# Patient Record
Sex: FEMALE | Race: BLACK OR AFRICAN AMERICAN | Employment: OTHER | URBAN - METROPOLITAN AREA
[De-identification: names, ages, dates, MRNs, and addresses within clinical notes are randomized per-mention and may not be internally consistent; named-entity substitution may affect disease eponyms.]

---

## 2018-08-28 NOTE — H&P
Adonis Elkins M.D.  [phone]            History and Physical       NAME:  Medora Phalen   :   [birth year]   MRN:   [id number]       Referring Physician:  No primary care provider on file. Consult Date: 2018 3:34 PM    Chief Complaint: RUQ Pain    History of Present Illness:  Ms. Keke Hebert is a 75year old lady who is being seen today in consultation per request of Dr. Nolan Huertas for abdominal pain. States she has been having RUQ pain that has been ongoing for at least a years time. States it flared up when she moved here from [place]. Pain is dull achy and constant. rates the pain at 7/10 in severity. Denies any radiation of her pain  She was given a course of Abx recently for a UTI and that seemed to have helped her pain for 2 days but came back. Denies any other relieving factors. Denies any known aggravating factors. Denies any association with food intake. Her GB was removed about 8 years ago for abdominal pain. She endorses a h/o IBS-D. Her last colonoscopy was 2 years ago -- had polyps. She also had an US 1 year ago and was normal.    The move has been very stressful for her. PMH:  Past Medical History:   Diagnosis Date    Arthritis     Hypertension        PSH:  Past Surgical History:   Procedure Laterality Date    HX GI      colon resection during hysterectomy    HX HEENT      skin cyst removed from neck    HX HYSTERECTOMY      HX SHOULDER ARTHROSCOPY      right    HX UROLOGICAL      bladder repair during hysterectomy       Allergies: Allergies   Allergen Reactions    Iron Other (comments)     IV IRON only convulsions    Nsaids (Non-Steroidal Anti-Inflammatory Drug) Other (comments)     convulsions       Home Medications:  Cannot display prior to admission medications because the patient has not been admitted in this contact. Hospital Medications:  No current facility-administered medications for this encounter.       No current outpatient prescriptions on file. Social History:  Social History   Substance Use Topics    Smoking status: Former Smoker     Packs/day: 0.25     Quit date: 1968    Smokeless tobacco: Never Used    Alcohol use No       Family History:  No family history on file. Review of Systems:      Constitutional: negative fever, negative chills, negative weight loss  Eyes:   negative visual changes  ENT:   negative sore throat, tongue or lip swelling  Respiratory:  negative cough, negative dyspnea  Cards:  negative for chest pain, palpitations, lower extremity edema  GI:   See HPI  :  negative for frequency, dysuria  Integument:  negative for rash and pruritus  Heme:  negative for easy bruising and gum/nose bleeding  Musculoskel: negative for myalgias,  back pain and muscle weakness  Neuro: negative for headaches, dizziness, vertigo  Psych:  negative for feelings of anxiety, depression       Objective:   No data found. EXAM:     NEURO-a&o   HEENT-wnl   LUNGS-clear    COR-regular rate and rhythym     ABD-soft , no tenderness, no rebound, good bs     EXT-no edema     Data Review     No results for input(s): WBC, HGB, HCT, PLT, HGBEXT, HCTEXT, PLTEXT in the last 72 hours. No results for input(s): NA, K, CL, CO2, BUN, CREA, GLU, PHOS, CA in the last 72 hours. No results for input(s): SGOT, GPT, AP, TBIL, TP, ALB, GLOB, GGT, AML, LPSE in the last 72 hours. No lab exists for component: AMYP, HLPSE  No results for input(s): INR, PTP, APTT in the last 72 hours. No lab exists for component: INREXT    There is no problem list on file for this patient. Assessment:   · RUQ pain (789.01  R10.11)  · Impression: Seems she had a good response to xifaxan in the recent past. This makes me wonder about small bowel intestinal overgrowth. · I will give her a course of Xifaxan and monitor her response. · Her labs are WNL's and I don't suspect a biliary etiology.   · I will however get an ultrasound  · In addition I will proceed with an EGD for evaluation of PUD  · If her symptoms persist I will consider neuromodulation   Plan:   · EGD today.      Signed By: Claribel Avina MD     8/28/2018  3:34 PM

## 2018-08-29 ENCOUNTER — HOSPITAL ENCOUNTER (OUTPATIENT)
Age: 73
Setting detail: OUTPATIENT SURGERY
Discharge: HOME | End: 2018-08-29
Attending: INTERNAL MEDICINE | Admitting: INTERNAL MEDICINE
Payer: MEDICARE

## 2018-08-29 ENCOUNTER — ANESTHESIA EVENT (OUTPATIENT)
Dept: ENDOSCOPY | Age: 73
End: 2018-08-29
Payer: MEDICARE

## 2018-08-29 ENCOUNTER — ANESTHESIA (OUTPATIENT)
Dept: ENDOSCOPY | Age: 73
End: 2018-08-29
Payer: MEDICARE

## 2018-08-29 VITALS
TEMPERATURE: 97.4 F | BODY MASS INDEX: 19.49 KG/M2 | RESPIRATION RATE: 14 BRPM | SYSTOLIC BLOOD PRESSURE: 159 MMHG | OXYGEN SATURATION: 100 % | HEART RATE: 65 BPM | HEIGHT: 63 IN | DIASTOLIC BLOOD PRESSURE: 73 MMHG | WEIGHT: 110 LBS

## 2018-08-29 PROCEDURE — 74011250636 HC RX REV CODE- 250/636: Performed by: INTERNAL MEDICINE

## 2018-08-29 PROCEDURE — 88342 IMHCHEM/IMCYTCHM 1ST ANTB: CPT | Performed by: INTERNAL MEDICINE

## 2018-08-29 PROCEDURE — 88305 TISSUE EXAM BY PATHOLOGIST: CPT | Performed by: INTERNAL MEDICINE

## 2018-08-29 PROCEDURE — 77030009426 HC FCPS BIOP ENDOSC BSC -B: Performed by: INTERNAL MEDICINE

## 2018-08-29 PROCEDURE — 76040000019: Performed by: INTERNAL MEDICINE

## 2018-08-29 PROCEDURE — 76060000031 HC ANESTHESIA FIRST 0.5 HR: Performed by: INTERNAL MEDICINE

## 2018-08-29 PROCEDURE — 74011250636 HC RX REV CODE- 250/636

## 2018-08-29 RX ORDER — MELOXICAM 15 MG/1
15 TABLET ORAL DAILY
COMMUNITY
End: 2018-08-29

## 2018-08-29 RX ORDER — PROPOFOL 10 MG/ML
INJECTION, EMULSION INTRAVENOUS AS NEEDED
Status: DISCONTINUED | OUTPATIENT
Start: 2018-08-29 | End: 2018-08-29 | Stop reason: HOSPADM

## 2018-08-29 RX ORDER — FLUMAZENIL 0.1 MG/ML
0.2 INJECTION INTRAVENOUS
Status: DISCONTINUED | OUTPATIENT
Start: 2018-08-29 | End: 2018-08-29 | Stop reason: HOSPADM

## 2018-08-29 RX ORDER — DEXTROMETHORPHAN/PSEUDOEPHED 2.5-7.5/.8
1.2 DROPS ORAL
Status: DISCONTINUED | OUTPATIENT
Start: 2018-08-29 | End: 2018-08-29 | Stop reason: HOSPADM

## 2018-08-29 RX ORDER — METOPROLOL SUCCINATE 25 MG/1
TABLET, EXTENDED RELEASE ORAL DAILY
Status: ON HOLD | COMMUNITY
End: 2022-10-12 | Stop reason: ALTCHOICE

## 2018-08-29 RX ORDER — DICYCLOMINE HYDROCHLORIDE 10 MG/1
10 CAPSULE ORAL DAILY
Status: ON HOLD | COMMUNITY
End: 2022-10-12 | Stop reason: ALTCHOICE

## 2018-08-29 RX ORDER — MIDAZOLAM HYDROCHLORIDE 1 MG/ML
.25-5 INJECTION, SOLUTION INTRAMUSCULAR; INTRAVENOUS
Status: DISCONTINUED | OUTPATIENT
Start: 2018-08-29 | End: 2018-08-29 | Stop reason: HOSPADM

## 2018-08-29 RX ORDER — NALOXONE HYDROCHLORIDE 0.4 MG/ML
0.4 INJECTION, SOLUTION INTRAMUSCULAR; INTRAVENOUS; SUBCUTANEOUS
Status: DISCONTINUED | OUTPATIENT
Start: 2018-08-29 | End: 2018-08-29 | Stop reason: HOSPADM

## 2018-08-29 RX ORDER — LIDOCAINE HYDROCHLORIDE 20 MG/ML
INJECTION, SOLUTION EPIDURAL; INFILTRATION; INTRACAUDAL; PERINEURAL AS NEEDED
Status: DISCONTINUED | OUTPATIENT
Start: 2018-08-29 | End: 2018-08-29 | Stop reason: HOSPADM

## 2018-08-29 RX ORDER — PROGESTERONE 100 MG/1
100 CAPSULE ORAL DAILY
Status: ON HOLD | COMMUNITY
End: 2022-10-11

## 2018-08-29 RX ORDER — HYDROCHLOROTHIAZIDE 25 MG/1
25 TABLET ORAL DAILY
Status: ON HOLD | COMMUNITY
End: 2022-10-12 | Stop reason: ALTCHOICE

## 2018-08-29 RX ORDER — SODIUM CHLORIDE 9 MG/ML
50 INJECTION, SOLUTION INTRAVENOUS CONTINUOUS
Status: DISCONTINUED | OUTPATIENT
Start: 2018-08-29 | End: 2018-08-29 | Stop reason: HOSPADM

## 2018-08-29 RX ORDER — CHOLECALCIFEROL TAB 125 MCG (5000 UNIT) 125 MCG
TAB ORAL DAILY
COMMUNITY

## 2018-08-29 RX ORDER — LANOLIN ALCOHOL/MO/W.PET/CERES
CREAM (GRAM) TOPICAL
Status: ON HOLD | COMMUNITY
End: 2022-10-12 | Stop reason: ALTCHOICE

## 2018-08-29 RX ORDER — LOSARTAN POTASSIUM 25 MG/1
TABLET ORAL DAILY
COMMUNITY
End: 2022-10-13

## 2018-08-29 RX ORDER — ATROPINE SULFATE 0.1 MG/ML
0.4 INJECTION INTRAVENOUS
Status: DISCONTINUED | OUTPATIENT
Start: 2018-08-29 | End: 2018-08-29 | Stop reason: HOSPADM

## 2018-08-29 RX ORDER — EPINEPHRINE 0.1 MG/ML
1 INJECTION INTRACARDIAC; INTRAVENOUS
Status: DISCONTINUED | OUTPATIENT
Start: 2018-08-29 | End: 2018-08-29 | Stop reason: HOSPADM

## 2018-08-29 RX ORDER — RANITIDINE 150 MG/1
150 CAPSULE ORAL DAILY
Status: ON HOLD | COMMUNITY
End: 2022-10-12 | Stop reason: ALTCHOICE

## 2018-08-29 RX ORDER — CALCIUM POLYCARBOPHIL 625 MG
625 TABLET ORAL DAILY
Status: ON HOLD | COMMUNITY
End: 2022-10-12 | Stop reason: ALTCHOICE

## 2018-08-29 RX ADMIN — PROPOFOL 50 MG: 10 INJECTION, EMULSION INTRAVENOUS at 14:38

## 2018-08-29 RX ADMIN — PROPOFOL 30 MG: 10 INJECTION, EMULSION INTRAVENOUS at 14:41

## 2018-08-29 RX ADMIN — LIDOCAINE HYDROCHLORIDE 60 MG: 20 INJECTION, SOLUTION EPIDURAL; INFILTRATION; INTRACAUDAL; PERINEURAL at 14:38

## 2018-08-29 RX ADMIN — PROPOFOL 30 MG: 10 INJECTION, EMULSION INTRAVENOUS at 14:43

## 2018-08-29 RX ADMIN — SODIUM CHLORIDE 50 ML/HR: 9 INJECTION, SOLUTION INTRAVENOUS at 14:20

## 2018-08-29 NOTE — PROCEDURES
Ethan Brothers M.D.  [phone]           2018                EGD Operative Report  Cece Lehman  :  1945  [facility] Medical Record Number:  [id number]      Indication: RUQ pain     : Karina Tobias MD    Referring Provider:  Fabi Billingsley MD      Anesthesia/Sedation:  MAC anesthesia Propofol    Airway assessment: No airway problems anticipated    Pre-Procedural Exam:      Airway: clear, no airway problems anticipated  Heart: RRR, without gallops or rubs  Lungs: clear bilaterally without wheezes, crackles, or rhonchi  Abdomen: soft, nontender, nondistended, bowel sounds present  Mental Status: awake, alert and oriented to person, place and time       Procedure Details     After infomed consent was obtained for the procedure, with all risks and benefits of procedure explained the patient was taken to the endoscopy suite and placed in the left lateral decubitus position. Following sequential administration of sedation as per above, the endoscope was inserted into the mouth and advanced under direct vision to second portion of the duodenum. A careful inspection was made as the gastroscope was withdrawn, including a retroflexed view of the proximal stomach; findings and interventions are described below. Findings:   Esophagus:normal  Stomach: gastritis with erosions and erythema noted in the antrum. Biopsies obtained  Duodenum/jejunum: normal    Therapies:  biopsy of stomach  biopsy of duodenal     Specimens: as above           Complications:   None; patient tolerated the procedure well. EBL:  None. Impression:    Gastritis noted. Biopsies obtained to rule out H.pylori infection                Otherwise normal EGD. Biopsies obtained from the small bowel to rule out celiac disease    Recommendations:    -Acid suppression with a proton pump inhibitor. ,   -Await pathology. ,   -Continue bentyl 10 mg TID  -Repeat course of xifaxan 550 mg TID x 14 days    Radha Medrano MD

## 2018-08-29 NOTE — PROGRESS NOTES
Assumed care of patient from anesthesia. Endoscope was pre-cleaned at bedside immediately following procedure by Laz Kerr. Report received from [address] See anesthesia flow-sheet for procedural sedation and vital signs. No respiratory distress. Abdomen without distention. Stable for transfer to recovery per anesthesia.

## 2018-08-29 NOTE — ROUTINE PROCESS
Ildefonso [facility]  1945  [id number]    Situation:  Verbal report received from: Angel Dyer RN  Procedure: Procedure(s):  ESOPHAGOGASTRODUODENOSCOPY (EGD)  ESOPHAGOGASTRODUODENAL (EGD) BIOPSY    Background:    Preoperative diagnosis: RUQ PAIN   Postoperative diagnosis: * No post-op diagnosis entered *    :  Dr. Glendy Barber  Assistant(s): Endoscopy Technician-1: Sangita Romero  Endoscopy RN-1: Robert Phillip RN    Specimens: * No specimens in log *  H. Pylori  no    Assessment:  Intra-procedure medications   Anesthesia gave intra-procedure sedation and medications, see anesthesia flow sheet yes    Intravenous fluids: NS@ KVO     Vital signs stable     Abdominal assessment: round and soft     Recommendation:  Discharge patient per MD order.   Family or Friend   Permission to share finding with family or friend yes

## 2018-08-29 NOTE — IP AVS SNAPSHOT
[address] [place] [address] 
[phone] Patient: Sandy Cabrera MRN: [id number] About your hospitalization You were admitted on:  2018 You last received care in the:  [facility] ENDOSCOPY You were discharged on:  2018 Why you were hospitalized Your primary diagnosis was:  Not on File Follow-up Information None Discharge Orders None A check juan antonio indicates which time of day the medication should be taken. My Medications CHANGE how you take these medications Instructions Each Dose to Equal  
 Morning Noon Evening Bedtime * XIFAXAN 550 mg tablet Generic drug:  rifAXIMin What changed:  Another medication with the same name was added. Make sure you understand how and when to take each. Your last dose was: Your next dose is: Take 550 mg by mouth two (2) times a day. 550 mg  
    
   
   
   
  
 * rifAXIMin 550 mg tablet Commonly known as:  Fabrizio London What changed: You were already taking a medication with the same name, and this prescription was added. Make sure you understand how and when to take each. Your last dose was: Your next dose is: Take 1 Tab by mouth every fourteen (14) days for 14 days. 550 mg  
    
   
   
   
  
 * Notice: This list has 2 medication(s) that are the same as other medications prescribed for you. Read the directions carefully, and ask your doctor or other care provider to review them with you. CONTINUE taking these medications Instructions Each Dose to Equal  
 Morning Noon Evening Bedtime  
 calcium polycarbophil 625 mg tablet Commonly known as:  Lata Schmidt Your last dose was: Your next dose is: Take 625 mg by mouth daily. 625 mg  
    
   
   
   
  
 cholecalciferol (VITAMIN D3) 5,000 unit Tab tablet Commonly known as:  VITAMIN D3 Your last dose was: Your next dose is: Take  by mouth daily. dicyclomine 10 mg capsule Commonly known as:  BENTYL Your last dose was: Your next dose is: Take 10 mg by mouth daily. 10 mg  
    
   
   
   
  
 hydroCHLOROthiazide 25 mg tablet Commonly known as:  HYDRODIURIL Your last dose was: Your next dose is: Take 25 mg by mouth daily. 25 mg KLOR-CON M20 PO Your last dose was: Your next dose is: Take  by mouth daily. losartan 25 mg tablet Commonly known as:  COZAAR Your last dose was: Your next dose is: Take  by mouth daily. melatonin 3 mg tablet Your last dose was: Your next dose is: Take  by mouth nightly. metoprolol succinate 25 mg XL tablet Commonly known as:  TOPROL-XL Your last dose was: Your next dose is: Take  by mouth daily. ONE-A-DAY 50 PLUS PO Your last dose was: Your next dose is: Take 1 Tab by mouth daily. 1 Tab  
    
   
   
   
  
 progesterone 100 mg capsule Commonly known as:  PROMETRIUM Your last dose was: Your next dose is: Take 100 mg by mouth daily. 100 mg  
    
   
   
   
  
 raNITIdine hcl 150 mg capsule Your last dose was: Your next dose is: Take 150 mg by mouth daily. 150 mg  
    
   
   
   
  
 VALACYCLOVIR PO Your last dose was: Your next dose is: Take  by mouth daily as needed (herpes virus). STOP taking these medications   
 meloxicam 15 mg tablet Commonly known as:  MOBIC Where to Get Your Medications Information on where to get these meds will be given to you by the nurse or doctor. ! Ask your nurse or doctor about these medications  
  rifAXIMin 550 mg tablet Discharge Instructions Liu Yao M.D. 
[phone] 
        
2018 Laly Leblanc :  1945 [facility] Record Number:  [id number] ENDOSCOPY FINDINGS: 
 Your endoscopy showed  gastritis. EGD DISCHARGE INSTRUCTIONS DISCOMFORT: 
Sore throat- throat lozenges or warm salt water gargle 
redness at IV site- apply warm compress to area; if redness or soreness persist- contact your physician Gaseous discomfort- walking, belching will help relieve any discomfort You may not operate a vehicle for 12 hours You may not engage in an occupation involving machinery or appliances for rest of today You may not drink alcoholic beverages for at least 12 hours Avoid making any critical decisions for at least 24 hour DIET: 
 You may resume your regular diet. ACTIVITY Spend the remainder of the day resting -  avoid any strenuous activity. Avoid driving or operating machinery. CALL M.D. ANY SIGN OF Increasing pain, nausea, vomiting Abdominal distension (swelling) New increased bleeding (oral or rectal) Fever (chills) Pain in chest area Bloody discharge from nose or mouth Shortness of breath Follow-up Instructions: 
 Call Dr. Erinn Warren for any questions or problems. Telephone # [phone] If biopsies were obtained, the results will be available  in  5 to 7 days. We will call you to notify you of these results. Continue same medications. Follow up in the office. Introducing [facility]! [facility] introduces [facility] patient portal. Now you can access parts of your medical record, email your doctor's office, and request medication refills online. 1. In your internet browser, go to [email or web address] 2. Click on the First Time User? Click Here link in the Sign In box. You will see the New Member Sign Up page. 3. Enter your [employer] Access Code exactly as it appears below. You will not need to use this code after youve completed the sign-up process. If you do not sign up before the expiration date, you must request a new code. · [employer] Access Code: [email or web address] Expires: 11/27/2018  1:07 PM 
 
4. Enter the last four digits of your Social Security Number (xxxx) and Date of Birth (mm/dd/yyyy) as indicated and click Submit. You will be taken to the next sign-up page. 5. Create a Echoing Green ID. This will be your [employer] login ID and cannot be changed, so think of one that is secure and easy to remember. 6. Create a [employer] password. You can change your password at any time. 7. Enter your Password Reset Question and Answer. This can be used at a later time if you forget your password. 8. Enter your e-mail address. You will receive e-mail notification when new information is available in [address] 9. Click Sign Up. You can now view and download portions of your medical record. 10. Click the Download Summary menu link to download a portable copy of your medical information. If you have questions, please visit the Frequently Asked Questions section of the [employer] website. Remember, [employer] is NOT to be used for urgent needs. For medical emergencies, dial 911. Now available from your iPhone and Android! Introducing Khadar Sweet As a [facility] patient, I wanted to make you aware of our electronic visit tool called Khadar Sweet. [facility] 24/7 allows you to connect within minutes with a medical provider 24 hours a day, seven days a week via a mobile device or tablet or logging into a secure website from your computer. You can access Khadar Sweet from anywhere in the [place].  
 
A virtual visit might be right for you when you have a simple condition and feel like you just dont want to get out of bed, or cant get away from work for an appointment, when your regular [facility] provider is not available (evenings, weekends or holidays), or when youre out of town and need minor care. Electronic visits cost only $49 and if the [facility] 24/7 provider determines a prescription is needed to treat your condition, one can be electronically transmitted to a nearby pharmacy*. Please take a moment to enroll today if you have not already done so. The enrollment process is free and takes just a few minutes. To enroll, please download the Cher[employer] 24/7 mick to your tablet or phone, or visit [email or web address] to enroll on your computer. And, as an [address] [place] [address] patient with a [employer] account, the results of your visits will be scanned into your electronic medical record and your primary care provider will be able to view the scanned results. We urge you to continue to see your regular [facility] provider for your ongoing medical care. And while your primary care provider may not be the one available when you seek a [facility] virtual visit, the peace of mind you get from getting a real diagnosis real time can be priceless. For more information on [facility], view our Frequently Asked Questions (FAQs) at [email or web address]. Sincerely, 
 
Nadira Nunez MD 
Chief Medical Officer Millicent Whitney Brooke *:  certain medications cannot be prescribed via [facility] Providers Seen During Your Hospitalization Provider Specialty Primary office phone Samira Dockery MD Gastroenterology [phone] Your Primary Care Physician (PCP) Primary Care Physician Office Phone Office Fax Violetta Flores [phone] You are allergic to the following Allergen Reactions Iron Other (comments) IV IRON only gave her convulsions Recent Documentation Height Weight Breastfeeding? BMI OB Status Smoking Status 1.6 m 49.9 kg No 19.49 kg/m2 Hysterectomy Former Smoker Emergency Contacts Name Discharge Info Relation Home Work Mobile [address] CAREGIVER [3] Spouse [3] [phone] Patient Belongings The following personal items are in your possession at time of discharge: 
  Dental Appliances: None  Visual Aid: None Please provide this summary of care documentation to your next provider. Signatures-by signing, you are acknowledging that this After Visit Summary has been reviewed with you and you have received a copy. Patient Signature:  ____________________________________________________________ Date:  ____________________________________________________________  
  
Pasc[facility] Sport Provider Signature:  ____________________________________________________________ Date:  ____________________________________________________________

## 2018-08-29 NOTE — ANESTHESIA PREPROCEDURE EVALUATION
Anesthetic History   No history of anesthetic complications            Review of Systems / Medical History  Patient summary reviewed, nursing notes reviewed and pertinent labs reviewed    Pulmonary  Within defined limits            Pertinent negatives: No recent URI and smoker     Neuro/Psych   Within defined limits           Cardiovascular    Hypertension: well controlled              Exercise tolerance: >4 METS     GI/Hepatic/Renal             Pertinent negatives: No GERD   Endo/Other        Arthritis     Other Findings            Physical Exam    Airway  Mallampati: II  TM Distance: > 6 cm  Neck ROM: normal range of motion   Mouth opening: Normal     Cardiovascular  Regular rate and rhythm,  S1 and S2 normal,  no murmur, click, rub, or gallop  Rhythm: regular  Rate: normal         Dental         Pulmonary  Breath sounds clear to auscultation               Abdominal  GI exam deferred       Other Findings            Anesthetic Plan    ASA: 2  Anesthesia type: MAC          Induction: Intravenous  Anesthetic plan and risks discussed with: Patient

## 2018-08-29 NOTE — DISCHARGE INSTRUCTIONS
Gildardo Lema M.D.  [phone]           2018  Gerry Rees  :  1945  [facility] Medical Record Number:  [id number]        ENDOSCOPY FINDINGS:   Your endoscopy showed  gastritis. EGD DISCHARGE INSTRUCTIONS    DISCOMFORT:  Sore throat- throat lozenges or warm salt water gargle  redness at IV site- apply warm compress to area; if redness or soreness persist- contact your physician  Gaseous discomfort- walking, belching will help relieve any discomfort  You may not operate a vehicle for 12 hours  You may not engage in an occupation involving machinery or appliances for rest of today  You may not drink alcoholic beverages for at least 12 hours  Avoid making any critical decisions for at least 24 hour    DIET:   You may resume your regular diet. ACTIVITY  Spend the remainder of the day resting -  avoid any strenuous activity. Avoid driving or operating machinery. CALL M.D. ANY SIGN OF   Increasing pain, nausea, vomiting  Abdominal distension (swelling)  New increased bleeding (oral or rectal)  Fever (chills)  Pain in chest area  Bloody discharge from nose or mouth  Shortness of breath    Follow-up Instructions:   Call Dr. Liliana Louie for any questions or problems. Telephone # [phone]  If biopsies were obtained, the results will be available  in  5 to 7 days. We will call you to notify you of these results. Continue same medications. Follow up in the office.

## 2018-08-31 NOTE — ANESTHESIA POSTPROCEDURE EVALUATION
Post-Anesthesia Evaluation and Assessment    Patient: Ruth Dorsey MRN: [id number]  SSN: [id number]    YOB: 1945  Age: 75 y.o. Sex: female       Cardiovascular Function/Vital Signs  Visit Vitals    /73    Pulse 65    Temp 36.3 °C (97.4 °F)    Resp 14    Ht 5' 3\" (1.6 m)    Wt 49.9 kg (110 lb)    SpO2 100%    Breastfeeding No    BMI 19.49 kg/m2       Patient is status post MAC anesthesia for Procedure(s):  ESOPHAGOGASTRODUODENOSCOPY (EGD)  ESOPHAGOGASTRODUODENAL (EGD) BIOPSY. Nausea/Vomiting: None    Postoperative hydration reviewed and adequate. Pain:  Pain Scale 1: Numeric (0 - 10) (08/29/18 1522)  Pain Intensity 1: 0 (08/29/18 1522)   Managed    Neurological Status: At baseline    Mental Status and Level of Consciousness: Arousable    Pulmonary Status:   O2 Device: Room air (08/29/18 1522)   Adequate oxygenation and airway patent    Complications related to anesthesia: None    Post-anesthesia assessment completed.  No concerns    Signed By: Pj Garcia MD     August 31, 2018

## 2019-07-15 ENCOUNTER — HOSPITAL ENCOUNTER (OUTPATIENT)
Dept: CT IMAGING | Age: 74
Discharge: HOME | End: 2019-07-15
Attending: ORTHOPAEDIC SURGERY
Payer: MEDICARE

## 2019-07-15 DIAGNOSIS — M16.11 PRIMARY LOCALIZED OSTEOARTHROSIS OF RIGHT HIP: ICD-10-CM

## 2019-07-15 PROCEDURE — 73700 CT LOWER EXTREMITY W/O DYE: CPT

## 2020-06-11 ENCOUNTER — HOSPITAL ENCOUNTER (OUTPATIENT)
Dept: MAMMOGRAPHY | Age: 75
Discharge: HOME | End: 2020-06-11
Attending: FAMILY MEDICINE
Payer: MEDICARE

## 2020-06-11 DIAGNOSIS — Z12.31 VISIT FOR SCREENING MAMMOGRAM: ICD-10-CM

## 2020-06-11 DIAGNOSIS — Z78.0 MENOPAUSE: ICD-10-CM

## 2020-06-11 PROCEDURE — 77080 DXA BONE DENSITY AXIAL: CPT

## 2020-06-11 PROCEDURE — 77067 SCR MAMMO BI INCL CAD: CPT

## 2021-06-07 ENCOUNTER — TRANSCRIBE ORDER (OUTPATIENT)
Dept: SCHEDULING | Age: 76
End: 2021-06-07

## 2021-06-07 DIAGNOSIS — Z12.31 SCREENING MAMMOGRAM FOR HIGH-RISK PATIENT: Primary | ICD-10-CM

## 2021-06-14 ENCOUNTER — HOSPITAL ENCOUNTER (OUTPATIENT)
Dept: MAMMOGRAPHY | Age: 76
Discharge: HOME | End: 2021-06-14
Attending: FAMILY MEDICINE
Payer: MEDICARE

## 2021-06-14 DIAGNOSIS — Z12.31 SCREENING MAMMOGRAM FOR HIGH-RISK PATIENT: ICD-10-CM

## 2021-06-14 PROCEDURE — 77067 SCR MAMMO BI INCL CAD: CPT

## 2022-08-08 ENCOUNTER — TRANSCRIBE ORDER (OUTPATIENT)
Dept: SCHEDULING | Age: 77
End: 2022-08-08

## 2022-08-08 DIAGNOSIS — Z12.31 VISIT FOR SCREENING MAMMOGRAM: Primary | ICD-10-CM

## 2022-08-10 ENCOUNTER — HOSPITAL ENCOUNTER (OUTPATIENT)
Dept: MAMMOGRAPHY | Age: 77
Discharge: HOME | End: 2022-08-10
Attending: FAMILY MEDICINE
Payer: MEDICARE

## 2022-08-10 DIAGNOSIS — Z12.31 VISIT FOR SCREENING MAMMOGRAM: ICD-10-CM

## 2022-08-10 PROCEDURE — 77067 SCR MAMMO BI INCL CAD: CPT

## 2022-08-17 ENCOUNTER — TRANSCRIBE ORDER (OUTPATIENT)
Dept: SCHEDULING | Age: 77
End: 2022-08-17

## 2022-08-17 DIAGNOSIS — Z12.31 SCREENING MAMMOGRAM FOR HIGH-RISK PATIENT: Primary | ICD-10-CM

## 2022-10-01 ENCOUNTER — APPOINTMENT (OUTPATIENT)
Dept: GENERAL RADIOLOGY | Age: 77
DRG: 296 | End: 2022-10-01
Attending: STUDENT IN AN ORGANIZED HEALTH CARE EDUCATION/TRAINING PROGRAM
Payer: MEDICARE

## 2022-10-01 ENCOUNTER — APPOINTMENT (OUTPATIENT)
Dept: CT IMAGING | Age: 77
DRG: 296 | End: 2022-10-01
Attending: STUDENT IN AN ORGANIZED HEALTH CARE EDUCATION/TRAINING PROGRAM
Payer: MEDICARE

## 2022-10-01 ENCOUNTER — HOSPITAL ENCOUNTER (INPATIENT)
Age: 77
LOS: 1 days | Discharge: ANOTHER HOSPITAL | DRG: 296 | End: 2022-10-02
Attending: STUDENT IN AN ORGANIZED HEALTH CARE EDUCATION/TRAINING PROGRAM | Admitting: INTERNAL MEDICINE
Payer: MEDICARE

## 2022-10-01 DIAGNOSIS — I49.01 VENTRICULAR FIBRILLATION (HCC): ICD-10-CM

## 2022-10-01 DIAGNOSIS — I46.9 CARDIAC ARREST (HCC): Primary | ICD-10-CM

## 2022-10-01 LAB
ALBUMIN SERPL-MCNC: 2.8 G/DL (ref 3.5–5)
ALBUMIN/GLOB SERPL: 1 {RATIO} (ref 1.1–2.2)
ALP SERPL-CCNC: 72 U/L (ref 45–117)
ALT SERPL-CCNC: 172 U/L (ref 12–78)
ANION GAP SERPL CALC-SCNC: 14 MMOL/L (ref 5–15)
APPEARANCE UR: ABNORMAL
ARTERIAL PATENCY WRIST A: YES
AST SERPL-CCNC: 203 U/L (ref 15–37)
BACTERIA URNS QL MICRO: ABNORMAL /HPF
BASE DEFICIT BLD-SCNC: 8.4 MMOL/L
BASE DEFICIT BLDA-SCNC: 5.8 MMOL/L
BASOPHILS # BLD: 0 K/UL (ref 0–0.1)
BASOPHILS NFR BLD: 0 % (ref 0–1)
BDY SITE: ABNORMAL
BILIRUB SERPL-MCNC: 2 MG/DL (ref 0.2–1)
BILIRUB UR QL: NEGATIVE
BNP SERPL-MCNC: 1265 PG/ML
BUN SERPL-MCNC: 44 MG/DL (ref 6–20)
BUN/CREAT SERPL: 24 (ref 12–20)
CA-I BLD-MCNC: 0.96 MMOL/L (ref 1.12–1.32)
CALCIUM SERPL-MCNC: 8.4 MG/DL (ref 8.5–10.1)
CHLORIDE BLD-SCNC: 106 MMOL/L (ref 100–108)
CHLORIDE SERPL-SCNC: 104 MMOL/L (ref 97–108)
CO2 BLD-SCNC: 17 MMOL/L (ref 19–24)
CO2 SERPL-SCNC: 16 MMOL/L (ref 21–32)
COLOR UR: ABNORMAL
COMMENT, HOLDF: NORMAL
CREAT SERPL-MCNC: 1.84 MG/DL (ref 0.55–1.02)
CREAT UR-MCNC: 1.5 MG/DL (ref 0.6–1.3)
DIFFERENTIAL METHOD BLD: ABNORMAL
EOSINOPHIL # BLD: 0 K/UL (ref 0–0.4)
EOSINOPHIL NFR BLD: 0 % (ref 0–7)
EPITH CASTS URNS QL MICRO: ABNORMAL /LPF
ERYTHROCYTE [DISTWIDTH] IN BLOOD BY AUTOMATED COUNT: 12.8 % (ref 11.5–14.5)
ETHANOL SERPL-MCNC: <10 MG/DL
FIO2 ON VENT: 100 %
GAS FLOW.O2 SETTING OXYMISER: 18 L/MIN
GLOBULIN SER CALC-MCNC: 2.7 G/DL (ref 2–4)
GLUCOSE BLD STRIP.AUTO-MCNC: 196 MG/DL (ref 74–106)
GLUCOSE SERPL-MCNC: 208 MG/DL (ref 65–100)
GLUCOSE UR STRIP.AUTO-MCNC: NEGATIVE MG/DL
HCO3 BLDA-SCNC: 16 MMOL/L
HCO3 BLDA-SCNC: 16 MMOL/L (ref 22–26)
HCT VFR BLD AUTO: 41.8 % (ref 35–47)
HGB BLD-MCNC: 12.8 G/DL (ref 11.5–16)
HGB UR QL STRIP: ABNORMAL
HYALINE CASTS URNS QL MICRO: >20 /LPF (ref 0–5)
IMM GRANULOCYTES # BLD AUTO: 0 K/UL
IMM GRANULOCYTES NFR BLD AUTO: 0 %
KETONES UR QL STRIP.AUTO: NEGATIVE MG/DL
LACTATE BLD-SCNC: 4.39 MMOL/L (ref 0.4–2)
LEUKOCYTE ESTERASE UR QL STRIP.AUTO: NEGATIVE
LYMPHOCYTES # BLD: 2.4 K/UL (ref 0.8–3.5)
LYMPHOCYTES NFR BLD: 37 % (ref 12–49)
MAGNESIUM SERPL-MCNC: 1.4 MG/DL (ref 1.6–2.4)
MCH RBC QN AUTO: 24.5 PG (ref 26–34)
MCHC RBC AUTO-ENTMCNC: 30.6 G/DL (ref 30–36.5)
MCV RBC AUTO: 80.1 FL (ref 80–99)
METAMYELOCYTES NFR BLD MANUAL: 2 %
MONOCYTES # BLD: 0.3 K/UL (ref 0–1)
MONOCYTES NFR BLD: 4 % (ref 5–13)
NEUTS BAND NFR BLD MANUAL: 1 % (ref 0–6)
NEUTS SEG # BLD: 3.7 K/UL (ref 1.8–8)
NEUTS SEG NFR BLD: 56 % (ref 32–75)
NITRITE UR QL STRIP.AUTO: NEGATIVE
NRBC # BLD: 0 K/UL (ref 0–0.01)
NRBC BLD-RTO: 0 PER 100 WBC
PCO2 BLDA: 23 MMHG (ref 35–45)
PCO2 BLDV: 30.9 MMHG (ref 41–51)
PEEP RESPIRATORY: 5 CM[H2O]
PH BLDA: 7.46 [PH] (ref 7.35–7.45)
PH BLDV: 7.33 [PH] (ref 7.32–7.42)
PH UR STRIP: 6 [PH] (ref 5–8)
PLATELET # BLD AUTO: 131 K/UL (ref 150–400)
PMV BLD AUTO: 12.9 FL (ref 8.9–12.9)
PO2 BLDA: 480 MMHG (ref 80–100)
PO2 BLDV: 75 MMHG (ref 25–40)
POTASSIUM BLD-SCNC: 3.2 MMOL/L (ref 3.5–5.5)
POTASSIUM SERPL-SCNC: 3 MMOL/L (ref 3.5–5.1)
PROT SERPL-MCNC: 5.5 G/DL (ref 6.4–8.2)
PROT UR STRIP-MCNC: 100 MG/DL
RBC # BLD AUTO: 5.22 M/UL (ref 3.8–5.2)
RBC #/AREA URNS HPF: ABNORMAL /HPF (ref 0–5)
RBC MORPH BLD: ABNORMAL
SAMPLES BEING HELD,HOLD: NORMAL
SAO2 % BLD: 100 % (ref 92–97)
SAO2% DEVICE SAO2% SENSOR NAME: ABNORMAL
SODIUM BLD-SCNC: 140 MMOL/L (ref 136–145)
SODIUM SERPL-SCNC: 134 MMOL/L (ref 136–145)
SP GR UR REFRACTOMETRY: 1.02 (ref 1–1.03)
SPECIMEN SITE: ABNORMAL
SPECIMEN SITE: ABNORMAL
TROPONIN-HIGH SENSITIVITY: 271 NG/L (ref 0–51)
UR CULT HOLD, URHOLD: NORMAL
UROBILINOGEN UR QL STRIP.AUTO: 0.2 EU/DL (ref 0.2–1)
VT SETTING VENT: 350 ML
WBC # BLD AUTO: 6.5 K/UL (ref 3.6–11)
WBC URNS QL MICRO: ABNORMAL /HPF (ref 0–4)
YEAST URNS QL MICRO: PRESENT

## 2022-10-01 PROCEDURE — 93005 ELECTROCARDIOGRAM TRACING: CPT

## 2022-10-01 PROCEDURE — 99285 EMERGENCY DEPT VISIT HI MDM: CPT

## 2022-10-01 PROCEDURE — 71045 X-RAY EXAM CHEST 1 VIEW: CPT

## 2022-10-01 PROCEDURE — 82947 ASSAY GLUCOSE BLOOD QUANT: CPT

## 2022-10-01 PROCEDURE — 36600 WITHDRAWAL OF ARTERIAL BLOOD: CPT

## 2022-10-01 PROCEDURE — 84484 ASSAY OF TROPONIN QUANT: CPT

## 2022-10-01 PROCEDURE — 31500 INSERT EMERGENCY AIRWAY: CPT

## 2022-10-01 PROCEDURE — 80053 COMPREHEN METABOLIC PANEL: CPT

## 2022-10-01 PROCEDURE — 36415 COLL VENOUS BLD VENIPUNCTURE: CPT

## 2022-10-01 PROCEDURE — 74011000258 HC RX REV CODE- 258: Performed by: STUDENT IN AN ORGANIZED HEALTH CARE EDUCATION/TRAINING PROGRAM

## 2022-10-01 PROCEDURE — 70450 CT HEAD/BRAIN W/O DYE: CPT

## 2022-10-01 PROCEDURE — 82077 ASSAY SPEC XCP UR&BREATH IA: CPT

## 2022-10-01 PROCEDURE — 65610000006 HC RM INTENSIVE CARE

## 2022-10-01 PROCEDURE — 81001 URINALYSIS AUTO W/SCOPE: CPT

## 2022-10-01 PROCEDURE — 85025 COMPLETE CBC W/AUTO DIFF WBC: CPT

## 2022-10-01 PROCEDURE — 83735 ASSAY OF MAGNESIUM: CPT

## 2022-10-01 PROCEDURE — 74011250636 HC RX REV CODE- 250/636: Performed by: STUDENT IN AN ORGANIZED HEALTH CARE EDUCATION/TRAINING PROGRAM

## 2022-10-01 PROCEDURE — 94002 VENT MGMT INPAT INIT DAY: CPT

## 2022-10-01 PROCEDURE — 82803 BLOOD GASES ANY COMBINATION: CPT

## 2022-10-01 PROCEDURE — 83880 ASSAY OF NATRIURETIC PEPTIDE: CPT

## 2022-10-01 PROCEDURE — 96365 THER/PROPH/DIAG IV INF INIT: CPT

## 2022-10-01 PROCEDURE — 5A1945Z RESPIRATORY VENTILATION, 24-96 CONSECUTIVE HOURS: ICD-10-PCS | Performed by: INTERNAL MEDICINE

## 2022-10-01 PROCEDURE — 0BH17EZ INSERTION OF ENDOTRACHEAL AIRWAY INTO TRACHEA, VIA NATURAL OR ARTIFICIAL OPENING: ICD-10-PCS | Performed by: INTERNAL MEDICINE

## 2022-10-01 RX ORDER — ONDANSETRON 4 MG/1
4 TABLET, ORALLY DISINTEGRATING ORAL
Status: DISCONTINUED | OUTPATIENT
Start: 2022-10-01 | End: 2022-10-02 | Stop reason: HOSPADM

## 2022-10-01 RX ORDER — ACETAMINOPHEN 650 MG/1
650 SUPPOSITORY RECTAL
Status: DISCONTINUED | OUTPATIENT
Start: 2022-10-01 | End: 2022-10-02 | Stop reason: HOSPADM

## 2022-10-01 RX ORDER — ONDANSETRON 2 MG/ML
4 INJECTION INTRAMUSCULAR; INTRAVENOUS
Status: DISCONTINUED | OUTPATIENT
Start: 2022-10-01 | End: 2022-10-02 | Stop reason: HOSPADM

## 2022-10-01 RX ORDER — HEPARIN SODIUM 10000 [USP'U]/100ML
12-25 INJECTION, SOLUTION INTRAVENOUS
Status: DISCONTINUED | OUTPATIENT
Start: 2022-10-01 | End: 2022-10-02 | Stop reason: HOSPADM

## 2022-10-01 RX ORDER — POTASSIUM CHLORIDE 7.45 MG/ML
10 INJECTION INTRAVENOUS
Status: DISCONTINUED | OUTPATIENT
Start: 2022-10-02 | End: 2022-10-02 | Stop reason: HOSPADM

## 2022-10-01 RX ORDER — ACETAMINOPHEN 325 MG/1
650 TABLET ORAL
Status: DISCONTINUED | OUTPATIENT
Start: 2022-10-01 | End: 2022-10-02 | Stop reason: HOSPADM

## 2022-10-01 RX ORDER — SODIUM CHLORIDE 0.9 % (FLUSH) 0.9 %
5-40 SYRINGE (ML) INJECTION EVERY 8 HOURS
Status: DISCONTINUED | OUTPATIENT
Start: 2022-10-01 | End: 2022-10-02 | Stop reason: HOSPADM

## 2022-10-01 RX ORDER — HEPARIN SODIUM 1000 [USP'U]/ML
60 INJECTION, SOLUTION INTRAVENOUS; SUBCUTANEOUS ONCE
Status: DISCONTINUED | OUTPATIENT
Start: 2022-10-01 | End: 2022-10-02 | Stop reason: HOSPADM

## 2022-10-01 RX ORDER — POLYETHYLENE GLYCOL 3350 17 G/17G
17 POWDER, FOR SOLUTION ORAL DAILY PRN
Status: DISCONTINUED | OUTPATIENT
Start: 2022-10-01 | End: 2022-10-02 | Stop reason: HOSPADM

## 2022-10-01 RX ORDER — NOREPINEPHRINE BITARTRATE/D5W 8 MG/250ML
.5-16 PLASTIC BAG, INJECTION (ML) INTRAVENOUS
Status: DISCONTINUED | OUTPATIENT
Start: 2022-10-01 | End: 2022-10-02 | Stop reason: HOSPADM

## 2022-10-01 RX ORDER — MAGNESIUM SULFATE HEPTAHYDRATE 40 MG/ML
2 INJECTION, SOLUTION INTRAVENOUS ONCE
Status: DISCONTINUED | OUTPATIENT
Start: 2022-10-01 | End: 2022-10-02 | Stop reason: HOSPADM

## 2022-10-01 RX ORDER — ENOXAPARIN SODIUM 100 MG/ML
30 INJECTION SUBCUTANEOUS DAILY
Status: DISCONTINUED | OUTPATIENT
Start: 2022-10-02 | End: 2022-10-01

## 2022-10-01 RX ORDER — SODIUM CHLORIDE 0.9 % (FLUSH) 0.9 %
5-40 SYRINGE (ML) INJECTION AS NEEDED
Status: DISCONTINUED | OUTPATIENT
Start: 2022-10-01 | End: 2022-10-02 | Stop reason: HOSPADM

## 2022-10-01 RX ORDER — PROPOFOL 10 MG/ML
0-50 VIAL (ML) INTRAVENOUS
Status: DISCONTINUED | OUTPATIENT
Start: 2022-10-01 | End: 2022-10-02 | Stop reason: HOSPADM

## 2022-10-01 RX ADMIN — AMIODARONE HYDROCHLORIDE 1 MG/MIN: 50 INJECTION, SOLUTION INTRAVENOUS at 21:48

## 2022-10-01 RX ADMIN — Medication 4 MCG/MIN: at 22:07

## 2022-10-01 NOTE — Clinical Note
Status[de-identified] INPATIENT [101]   Type of Bed: Intensive Care [6]   Inpatient Hospitalization Certified Necessary for the Following Reasons: 3. Patient receiving treatment that can only be provided in an inpatient setting (further clarification in H&P documentation)   Admitting Diagnosis: Cardiac arrest ([facility] [address] [427. 5. ICD-9-CM]   Admitting Physician: Rohini Caldwell   Attending Physician: Rohini Thomas [[id number]]   Estimated Length of Stay: 3-4 Midnights   Discharge Plan[de-identified] Home with Office Follow-up

## 2022-10-02 ENCOUNTER — APPOINTMENT (OUTPATIENT)
Dept: NON INVASIVE DIAGNOSTICS | Age: 77
DRG: 222 | End: 2022-10-02
Attending: STUDENT IN AN ORGANIZED HEALTH CARE EDUCATION/TRAINING PROGRAM
Payer: MEDICARE

## 2022-10-02 ENCOUNTER — HOSPITAL ENCOUNTER (INPATIENT)
Age: 77
LOS: 11 days | Discharge: HOME HEALTH | DRG: 222 | End: 2022-10-13
Attending: STUDENT IN AN ORGANIZED HEALTH CARE EDUCATION/TRAINING PROGRAM | Admitting: STUDENT IN AN ORGANIZED HEALTH CARE EDUCATION/TRAINING PROGRAM
Payer: MEDICARE

## 2022-10-02 ENCOUNTER — APPOINTMENT (OUTPATIENT)
Dept: GENERAL RADIOLOGY | Age: 77
DRG: 222 | End: 2022-10-02
Attending: STUDENT IN AN ORGANIZED HEALTH CARE EDUCATION/TRAINING PROGRAM
Payer: MEDICARE

## 2022-10-02 VITALS
RESPIRATION RATE: 11 BRPM | DIASTOLIC BLOOD PRESSURE: 52 MMHG | HEART RATE: 62 BPM | SYSTOLIC BLOOD PRESSURE: 116 MMHG | BODY MASS INDEX: 19.67 KG/M2 | WEIGHT: 111 LBS | TEMPERATURE: 96.1 F | HEIGHT: 63 IN | OXYGEN SATURATION: 100 %

## 2022-10-02 DIAGNOSIS — I50.21 SYSTOLIC CHF, ACUTE (HCC): ICD-10-CM

## 2022-10-02 DIAGNOSIS — I50.9 CONGESTIVE HEART FAILURE, UNSPECIFIED HF CHRONICITY, UNSPECIFIED HEART FAILURE TYPE (HCC): ICD-10-CM

## 2022-10-02 DIAGNOSIS — Z79.899 RECEIVING INOTROPIC MEDICATION: ICD-10-CM

## 2022-10-02 DIAGNOSIS — I25.119 CORONARY ARTERY DISEASE WITH ANGINA PECTORIS, UNSPECIFIED VESSEL OR LESION TYPE, UNSPECIFIED WHETHER NATIVE OR TRANSPLANTED HEART (HCC): ICD-10-CM

## 2022-10-02 DIAGNOSIS — I42.9 CARDIOMYOPATHY, UNSPECIFIED TYPE (HCC): ICD-10-CM

## 2022-10-02 DIAGNOSIS — Z86.74 HISTORY OF SUDDEN CARDIAC ARREST: ICD-10-CM

## 2022-10-02 DIAGNOSIS — R57.0 CARDIOGENIC SHOCK (HCC): Primary | ICD-10-CM

## 2022-10-02 LAB
ALBUMIN SERPL-MCNC: 2.9 G/DL (ref 3.5–5)
ALBUMIN/GLOB SERPL: 1.2 {RATIO} (ref 1.1–2.2)
ALP SERPL-CCNC: 75 U/L (ref 45–117)
ALT SERPL-CCNC: 154 U/L (ref 12–78)
ANION GAP SERPL CALC-SCNC: 10 MMOL/L (ref 5–15)
ANION GAP SERPL CALC-SCNC: 7 MMOL/L (ref 5–15)
APTT PPP: 81.3 SEC (ref 22.1–31)
APTT PPP: 82.2 SEC (ref 22.1–31)
AST SERPL-CCNC: 189 U/L (ref 15–37)
BASOPHILS # BLD: 0 K/UL (ref 0–0.1)
BASOPHILS NFR BLD: 0 % (ref 0–1)
BILIRUB SERPL-MCNC: 2.6 MG/DL (ref 0.2–1)
BUN SERPL-MCNC: 41 MG/DL (ref 6–20)
BUN SERPL-MCNC: 44 MG/DL (ref 6–20)
BUN/CREAT SERPL: 24 (ref 12–20)
BUN/CREAT SERPL: 26 (ref 12–20)
CALCIUM SERPL-MCNC: 8.2 MG/DL (ref 8.5–10.1)
CALCIUM SERPL-MCNC: 8.4 MG/DL (ref 8.5–10.1)
CHLORIDE SERPL-SCNC: 103 MMOL/L (ref 97–108)
CHLORIDE SERPL-SCNC: 103 MMOL/L (ref 97–108)
CO2 SERPL-SCNC: 21 MMOL/L (ref 21–32)
CO2 SERPL-SCNC: 24 MMOL/L (ref 21–32)
CREAT SERPL-MCNC: 1.67 MG/DL (ref 0.55–1.02)
CREAT SERPL-MCNC: 1.68 MG/DL (ref 0.55–1.02)
DIFFERENTIAL METHOD BLD: ABNORMAL
EOSINOPHIL # BLD: 0 K/UL (ref 0–0.4)
EOSINOPHIL NFR BLD: 0 % (ref 0–7)
ERYTHROCYTE [DISTWIDTH] IN BLOOD BY AUTOMATED COUNT: 12.9 % (ref 11.5–14.5)
GLOBULIN SER CALC-MCNC: 2.5 G/DL (ref 2–4)
GLUCOSE BLD STRIP.AUTO-MCNC: 191 MG/DL (ref 65–117)
GLUCOSE BLD STRIP.AUTO-MCNC: 192 MG/DL (ref 65–117)
GLUCOSE SERPL-MCNC: 229 MG/DL (ref 65–100)
GLUCOSE SERPL-MCNC: 249 MG/DL (ref 65–100)
HCT VFR BLD AUTO: 39 % (ref 35–47)
HGB BLD-MCNC: 12.3 G/DL (ref 11.5–16)
IMM GRANULOCYTES # BLD AUTO: 0.2 K/UL (ref 0–0.04)
IMM GRANULOCYTES NFR BLD AUTO: 1 % (ref 0–0.5)
LACTATE SERPL-SCNC: 1.8 MMOL/L (ref 0.4–2)
LACTATE SERPL-SCNC: 2.1 MMOL/L (ref 0.4–2)
LACTATE SERPL-SCNC: 2.3 MMOL/L (ref 0.4–2)
LYMPHOCYTES # BLD: 0.6 K/UL (ref 0.8–3.5)
LYMPHOCYTES NFR BLD: 4 % (ref 12–49)
MAGNESIUM SERPL-MCNC: 1.4 MG/DL (ref 1.6–2.4)
MAGNESIUM SERPL-MCNC: 2.8 MG/DL (ref 1.6–2.4)
MCH RBC QN AUTO: 24.4 PG (ref 26–34)
MCHC RBC AUTO-ENTMCNC: 31.5 G/DL (ref 30–36.5)
MCV RBC AUTO: 77.4 FL (ref 80–99)
MONOCYTES # BLD: 0.8 K/UL (ref 0–1)
MONOCYTES NFR BLD: 5 % (ref 5–13)
NEUTS SEG # BLD: 13.6 K/UL (ref 1.8–8)
NEUTS SEG NFR BLD: 90 % (ref 32–75)
NRBC # BLD: 0 K/UL (ref 0–0.01)
NRBC BLD-RTO: 0 PER 100 WBC
PHOSPHATE SERPL-MCNC: 4.7 MG/DL (ref 2.6–4.7)
PLATELET # BLD AUTO: 132 K/UL (ref 150–400)
PMV BLD AUTO: 12.4 FL (ref 8.9–12.9)
POTASSIUM SERPL-SCNC: 2.5 MMOL/L (ref 3.5–5.1)
POTASSIUM SERPL-SCNC: 4.4 MMOL/L (ref 3.5–5.1)
PROT SERPL-MCNC: 5.4 G/DL (ref 6.4–8.2)
RBC # BLD AUTO: 5.04 M/UL (ref 3.8–5.2)
RBC MORPH BLD: ABNORMAL
SERVICE CMNT-IMP: ABNORMAL
SERVICE CMNT-IMP: ABNORMAL
SODIUM SERPL-SCNC: 134 MMOL/L (ref 136–145)
SODIUM SERPL-SCNC: 134 MMOL/L (ref 136–145)
THERAPEUTIC RANGE,PTTT: ABNORMAL SECS (ref 58–77)
THERAPEUTIC RANGE,PTTT: ABNORMAL SECS (ref 58–77)
TROPONIN-HIGH SENSITIVITY: 1352 NG/L (ref 0–51)
TROPONIN-HIGH SENSITIVITY: 641 NG/L (ref 0–51)
WBC # BLD AUTO: 15.2 K/UL (ref 3.6–11)

## 2022-10-02 PROCEDURE — 74011000250 HC RX REV CODE- 250: Performed by: STUDENT IN AN ORGANIZED HEALTH CARE EDUCATION/TRAINING PROGRAM

## 2022-10-02 PROCEDURE — 85730 THROMBOPLASTIN TIME PARTIAL: CPT

## 2022-10-02 PROCEDURE — 02HV33Z INSERTION OF INFUSION DEVICE INTO SUPERIOR VENA CAVA, PERCUTANEOUS APPROACH: ICD-10-PCS | Performed by: STUDENT IN AN ORGANIZED HEALTH CARE EDUCATION/TRAINING PROGRAM

## 2022-10-02 PROCEDURE — 94003 VENT MGMT INPAT SUBQ DAY: CPT

## 2022-10-02 PROCEDURE — 87040 BLOOD CULTURE FOR BACTERIA: CPT

## 2022-10-02 PROCEDURE — 83605 ASSAY OF LACTIC ACID: CPT

## 2022-10-02 PROCEDURE — 85025 COMPLETE CBC W/AUTO DIFF WBC: CPT

## 2022-10-02 PROCEDURE — 82962 GLUCOSE BLOOD TEST: CPT

## 2022-10-02 PROCEDURE — 71045 X-RAY EXAM CHEST 1 VIEW: CPT

## 2022-10-02 PROCEDURE — 74018 RADEX ABDOMEN 1 VIEW: CPT

## 2022-10-02 PROCEDURE — 84484 ASSAY OF TROPONIN QUANT: CPT

## 2022-10-02 PROCEDURE — 74011250636 HC RX REV CODE- 250/636: Performed by: STUDENT IN AN ORGANIZED HEALTH CARE EDUCATION/TRAINING PROGRAM

## 2022-10-02 PROCEDURE — 84100 ASSAY OF PHOSPHORUS: CPT

## 2022-10-02 PROCEDURE — P9045 ALBUMIN (HUMAN), 5%, 250 ML: HCPCS | Performed by: STUDENT IN AN ORGANIZED HEALTH CARE EDUCATION/TRAINING PROGRAM

## 2022-10-02 PROCEDURE — P9045 ALBUMIN (HUMAN), 5%, 250 ML: HCPCS

## 2022-10-02 PROCEDURE — 94002 VENT MGMT INPAT INIT DAY: CPT

## 2022-10-02 PROCEDURE — 36415 COLL VENOUS BLD VENIPUNCTURE: CPT

## 2022-10-02 PROCEDURE — 65620000000 HC RM CCU GENERAL

## 2022-10-02 PROCEDURE — 74011250637 HC RX REV CODE- 250/637: Performed by: STUDENT IN AN ORGANIZED HEALTH CARE EDUCATION/TRAINING PROGRAM

## 2022-10-02 PROCEDURE — 80053 COMPREHEN METABOLIC PANEL: CPT

## 2022-10-02 PROCEDURE — 74011250636 HC RX REV CODE- 250/636

## 2022-10-02 PROCEDURE — 5A1935Z RESPIRATORY VENTILATION, LESS THAN 24 CONSECUTIVE HOURS: ICD-10-PCS | Performed by: RADIOLOGY

## 2022-10-02 PROCEDURE — 74011000258 HC RX REV CODE- 258: Performed by: STUDENT IN AN ORGANIZED HEALTH CARE EDUCATION/TRAINING PROGRAM

## 2022-10-02 PROCEDURE — 0BH17EZ INSERTION OF ENDOTRACHEAL AIRWAY INTO TRACHEA, VIA NATURAL OR ARTIFICIAL OPENING: ICD-10-PCS | Performed by: RADIOLOGY

## 2022-10-02 PROCEDURE — 83735 ASSAY OF MAGNESIUM: CPT

## 2022-10-02 PROCEDURE — 93005 ELECTROCARDIOGRAM TRACING: CPT

## 2022-10-02 PROCEDURE — C8929 TTE W OR WO FOL WCON,DOPPLER: HCPCS

## 2022-10-02 RX ORDER — POTASSIUM CHLORIDE 29.8 MG/ML
20 INJECTION INTRAVENOUS
Status: COMPLETED | OUTPATIENT
Start: 2022-10-02 | End: 2022-10-02

## 2022-10-02 RX ORDER — MIDAZOLAM HYDROCHLORIDE 1 MG/ML
2 INJECTION, SOLUTION INTRAMUSCULAR; INTRAVENOUS ONCE
Status: COMPLETED | OUTPATIENT
Start: 2022-10-02 | End: 2022-10-02

## 2022-10-02 RX ORDER — ONDANSETRON 2 MG/ML
4 INJECTION INTRAMUSCULAR; INTRAVENOUS
Status: DISCONTINUED | OUTPATIENT
Start: 2022-10-02 | End: 2022-10-13 | Stop reason: HOSPADM

## 2022-10-02 RX ORDER — ACETAMINOPHEN 650 MG/1
650 SUPPOSITORY RECTAL
Status: DISCONTINUED | OUTPATIENT
Start: 2022-10-02 | End: 2022-10-13 | Stop reason: HOSPADM

## 2022-10-02 RX ORDER — ALBUMIN HUMAN 50 G/1000ML
SOLUTION INTRAVENOUS
Status: COMPLETED
Start: 2022-10-02 | End: 2022-10-02

## 2022-10-02 RX ORDER — ACETAMINOPHEN 325 MG/1
650 TABLET ORAL
Status: DISCONTINUED | OUTPATIENT
Start: 2022-10-02 | End: 2022-10-13 | Stop reason: HOSPADM

## 2022-10-02 RX ORDER — FAMOTIDINE 20 MG/1
20 TABLET, FILM COATED ORAL DAILY
Status: DISCONTINUED | OUTPATIENT
Start: 2022-10-02 | End: 2022-10-04

## 2022-10-02 RX ORDER — PROPOFOL 10 MG/ML
0-50 VIAL (ML) INTRAVENOUS
Status: DISCONTINUED | OUTPATIENT
Start: 2022-10-02 | End: 2022-10-04

## 2022-10-02 RX ORDER — FENTANYL CITRATE 50 UG/ML
100 INJECTION, SOLUTION INTRAMUSCULAR; INTRAVENOUS ONCE
Status: COMPLETED | OUTPATIENT
Start: 2022-10-02 | End: 2022-10-02

## 2022-10-02 RX ORDER — SODIUM CHLORIDE 0.9 % (FLUSH) 0.9 %
5-40 SYRINGE (ML) INJECTION EVERY 8 HOURS
Status: DISCONTINUED | OUTPATIENT
Start: 2022-10-02 | End: 2022-10-13 | Stop reason: HOSPADM

## 2022-10-02 RX ORDER — POLYETHYLENE GLYCOL 3350 17 G/17G
17 POWDER, FOR SOLUTION ORAL DAILY
Status: DISCONTINUED | OUTPATIENT
Start: 2022-10-02 | End: 2022-10-13 | Stop reason: HOSPADM

## 2022-10-02 RX ORDER — SODIUM CHLORIDE 0.9 % (FLUSH) 0.9 %
5-40 SYRINGE (ML) INJECTION AS NEEDED
Status: DISCONTINUED | OUTPATIENT
Start: 2022-10-02 | End: 2022-10-13 | Stop reason: HOSPADM

## 2022-10-02 RX ORDER — CHLORHEXIDINE GLUCONATE 1.2 MG/ML
15 RINSE ORAL EVERY 12 HOURS
Status: DISCONTINUED | OUTPATIENT
Start: 2022-10-02 | End: 2022-10-08

## 2022-10-02 RX ORDER — MAGNESIUM SULFATE HEPTAHYDRATE 40 MG/ML
2 INJECTION, SOLUTION INTRAVENOUS ONCE
Status: COMPLETED | OUTPATIENT
Start: 2022-10-02 | End: 2022-10-02

## 2022-10-02 RX ORDER — ALBUMIN HUMAN 50 G/1000ML
12.5 SOLUTION INTRAVENOUS ONCE
Status: COMPLETED | OUTPATIENT
Start: 2022-10-02 | End: 2022-10-02

## 2022-10-02 RX ORDER — NOREPINEPHRINE BITARTRATE/D5W 8 MG/250ML
.5-3 PLASTIC BAG, INJECTION (ML) INTRAVENOUS
Status: DISCONTINUED | OUTPATIENT
Start: 2022-10-02 | End: 2022-10-04

## 2022-10-02 RX ORDER — FAMOTIDINE 20 MG/1
20 TABLET, FILM COATED ORAL 2 TIMES DAILY
Status: DISCONTINUED | OUTPATIENT
Start: 2022-10-02 | End: 2022-10-02

## 2022-10-02 RX ORDER — HEPARIN SODIUM 10000 [USP'U]/100ML
12-25 INJECTION, SOLUTION INTRAVENOUS
Status: DISCONTINUED | OUTPATIENT
Start: 2022-10-02 | End: 2022-10-04

## 2022-10-02 RX ORDER — HEPARIN SODIUM 5000 [USP'U]/ML
5000 INJECTION, SOLUTION INTRAVENOUS; SUBCUTANEOUS EVERY 8 HOURS
Status: DISCONTINUED | OUTPATIENT
Start: 2022-10-02 | End: 2022-10-02

## 2022-10-02 RX ORDER — SODIUM CHLORIDE 9 MG/ML
75 INJECTION, SOLUTION INTRAVENOUS CONTINUOUS
Status: DISCONTINUED | OUTPATIENT
Start: 2022-10-02 | End: 2022-10-04

## 2022-10-02 RX ORDER — AMOXICILLIN 250 MG
1 CAPSULE ORAL 2 TIMES DAILY
Status: DISCONTINUED | OUTPATIENT
Start: 2022-10-02 | End: 2022-10-13 | Stop reason: HOSPADM

## 2022-10-02 RX ORDER — ADHESIVE BANDAGE
30 BANDAGE TOPICAL DAILY PRN
Status: DISCONTINUED | OUTPATIENT
Start: 2022-10-02 | End: 2022-10-13 | Stop reason: HOSPADM

## 2022-10-02 RX ORDER — ACETAMINOPHEN 500 MG
1000 TABLET ORAL EVERY 6 HOURS
Status: DISCONTINUED | OUTPATIENT
Start: 2022-10-02 | End: 2022-10-03

## 2022-10-02 RX ORDER — POTASSIUM CHLORIDE 29.8 MG/ML
20 INJECTION INTRAVENOUS
Status: DISCONTINUED | OUTPATIENT
Start: 2022-10-02 | End: 2022-10-02

## 2022-10-02 RX ADMIN — MIDAZOLAM 2 MG: 1 INJECTION INTRAMUSCULAR; INTRAVENOUS at 01:41

## 2022-10-02 RX ADMIN — SENNOSIDES AND DOCUSATE SODIUM 1 TABLET: 50; 8.6 TABLET ORAL at 20:50

## 2022-10-02 RX ADMIN — POTASSIUM CHLORIDE 20 MEQ: 29.8 INJECTION, SOLUTION INTRAVENOUS at 11:48

## 2022-10-02 RX ADMIN — PERFLUTREN 1.5 ML: 6.52 INJECTION, SUSPENSION INTRAVENOUS at 10:00

## 2022-10-02 RX ADMIN — POTASSIUM BICARBONATE 40 MEQ: 782 TABLET, EFFERVESCENT ORAL at 12:08

## 2022-10-02 RX ADMIN — FENTANYL CITRATE 50 MCG/HR: 50 INJECTION INTRAVENOUS at 03:12

## 2022-10-02 RX ADMIN — SODIUM CHLORIDE 75 ML/HR: 9 INJECTION, SOLUTION INTRAVENOUS at 05:37

## 2022-10-02 RX ADMIN — DEXTROSE MONOHYDRATE 30 MCG/MIN: 50 INJECTION, SOLUTION INTRAVENOUS at 15:39

## 2022-10-02 RX ADMIN — HEPARIN SODIUM 12 UNITS/KG/HR: 10000 INJECTION, SOLUTION INTRAVENOUS at 06:30

## 2022-10-02 RX ADMIN — DEXTROSE MONOHYDRATE 30 MCG/MIN: 50 INJECTION, SOLUTION INTRAVENOUS at 21:05

## 2022-10-02 RX ADMIN — MAGNESIUM SULFATE HEPTAHYDRATE 2 G: 40 INJECTION, SOLUTION INTRAVENOUS at 11:48

## 2022-10-02 RX ADMIN — DEXTROSE MONOHYDRATE 20 MCG/MIN: 50 INJECTION, SOLUTION INTRAVENOUS at 09:00

## 2022-10-02 RX ADMIN — POTASSIUM CHLORIDE 20 MEQ: 29.8 INJECTION, SOLUTION INTRAVENOUS at 12:54

## 2022-10-02 RX ADMIN — SODIUM CHLORIDE, PRESERVATIVE FREE 10 ML: 5 INJECTION INTRAVENOUS at 15:08

## 2022-10-02 RX ADMIN — POLYETHYLENE GLYCOL 3350 17 G: 17 POWDER, FOR SOLUTION ORAL at 10:16

## 2022-10-02 RX ADMIN — ALBUMIN HUMAN 12.5 G: 50 SOLUTION INTRAVENOUS at 18:47

## 2022-10-02 RX ADMIN — ACETAMINOPHEN 1000 MG: 500 TABLET, FILM COATED ORAL at 05:36

## 2022-10-02 RX ADMIN — ALBUMIN HUMAN 12.5 G: 50 SOLUTION INTRAVENOUS at 14:47

## 2022-10-02 RX ADMIN — FENTANYL CITRATE 100 MCG: 50 INJECTION INTRAMUSCULAR; INTRAVENOUS at 00:48

## 2022-10-02 RX ADMIN — PROPOFOL 10 MCG/KG/MIN: 10 INJECTION, EMULSION INTRAVENOUS at 00:01

## 2022-10-02 RX ADMIN — PROPOFOL 10 MCG/KG/MIN: 10 INJECTION, EMULSION INTRAVENOUS at 03:04

## 2022-10-02 RX ADMIN — CHLORHEXIDINE GLUCONATE 15 ML: 1.2 RINSE ORAL at 20:50

## 2022-10-02 RX ADMIN — CHLORHEXIDINE GLUCONATE 15 ML: 1.2 RINSE ORAL at 08:07

## 2022-10-02 RX ADMIN — FAMOTIDINE 20 MG: 20 TABLET, FILM COATED ORAL at 10:16

## 2022-10-02 RX ADMIN — PROPOFOL 20 MCG/KG/MIN: 10 INJECTION, EMULSION INTRAVENOUS at 22:18

## 2022-10-02 RX ADMIN — SENNOSIDES AND DOCUSATE SODIUM 1 TABLET: 50; 8.6 TABLET ORAL at 10:19

## 2022-10-02 RX ADMIN — ACETAMINOPHEN 1000 MG: 500 TABLET, FILM COATED ORAL at 11:47

## 2022-10-02 RX ADMIN — ACETAMINOPHEN 1000 MG: 500 TABLET, FILM COATED ORAL at 18:49

## 2022-10-02 NOTE — ED TRIAGE NOTES
Pt arrived via EMS after a witness cardiac arrest at 2026. Pt was in the middle of eating when she fainted,  started CPR . EMS resumed CPR upon arrival, 50 ketamine given.  Epi drip started     Pt was unresponsive upon arrival

## 2022-10-02 NOTE — PROGRESS NOTES
1000 Follow up on AM labs shows need for recollect order. 1200 PTT drawn and sent    1400 Labs drawn and sent. Patient bp dropping. MD made aware. Placed in trendelenburg and levo maxed with good result. Sedation stopped. 1445 Pt BP recovered, opens, tracks and focuses with eyes. Follows commands. Fentanyl restarted. 1500 Pt sitting up, grabbing for tube, propofol restarted and restraints applied. Family at bedside. 1830 PTT + Lactic drawn and sent    PTT result input in computer as drawn at 1553. Lab to correct collection time.      1930 Shift report given to [facility]

## 2022-10-02 NOTE — PROGRESS NOTES
Spiritual Care Assessment/Progress Note  Annetta Worthy      NAME: Soo Pina      MRN: [id number]  AGE: 65 y.o. SEX: female  Judaism Affiliation: Lutheran   Language: English     10/2/2022     Total Time (in minutes): 174     Spiritual Assessment begun in [facility] DEPT through conversation with:         []Patient        [x] Family    [] Friend(s)        Reason for Consult: Code Blue/99, Family care, Emergency Department visit, Request by physician, Patient care conference, Family conference     Spiritual beliefs: (Please include comment if needed)     [] Identifies with a fani tradition:         [] Supported by a fani community:            [] Claims no spiritual orientation:           [] Seeking spiritual identity:                [x] Adheres to an individual form of spirituality:           [] Not able to assess:                           Identified resources for coping:      [] Prayer                               [] Music                  [] Guided Imagery     [x] Family/friends                 [] Pet visits     [] Devotional reading                         [] Unknown     [] Other:                                               Interventions offered during this visit: (See comments for more details)          Family/Friend(s):  Affirmation of fani, Affirmation of emotions/emotional suffering, Coping skills reviewed/reinforced     Plan of Care:     [x] Support spiritual and/or cultural needs    [] Support AMD and/or advance care planning process      [x] Support grieving process   [] Coordinate Rites and/or Rituals    [] Coordination with community clergy   [] No spiritual needs identified at this time   [] Detailed Plan of Care below (See Comments)  [] Make referral to Music Therapy  [] Make referral to Pet Therapy     [] Make referral to Addiction services  [] Make referral to [facility]  [] Make referral to Spiritual Care Partner  [] No future visits requested        [] Contact Spiritual Care for further referrals     Comments: Pager response - physician request for support to family: Provided support to Mr Myriam Victoria and his 2 daughters during family conference with Dr. Shaan Sanchez. Medical update given to family, questions and concerns addressed. Mrs. Myriam Victoria will be Medi vac to [facility]. Collaborated with interdisciplinary team; provided ministry of presence, hospitality, empathic listening and cultivated relationships of trust, care and support. No spiritual needs noted. Advised of continued spiritual support. Visited by: Kiya Georges, [address] [place] [address] paging Service [phone])

## 2022-10-02 NOTE — CONSULTS
Cardiology Consult Note    CC: cardiac arrest  Reason for consult:  sudden cardiac death  Requesting MD:  Dr. Abbey Jj:      Date of  Admission: 10/2/2022  2:33 AM     Admission type:Urgent    Rosa Johnson is a 65 y.o. female admitted for Cardiac arrest ([facility] [address] [I46.9]. Patient just collapsed at dinner table. CPR was initiated by her  right away and then paramedic within 7 minutes. She is transferred to here for further evaluation. She did grab nurse's hand, not on command and has some inappropriate response. Decided not to place her on code ice due to her brain issue. She was seen by Dr. Meg Xiao two week sago for abnormal ekg and CHF sx. Echo showed EF 25 to 30% and she was waiting for CTA of heart. She was started on diuretic which she took for 7 days. Her failure sx included SHELTON/fatigue/orthopnea/weight gain. No recent exertional CP. Patient Active Problem List    Diagnosis Date Noted    Cardiac arrest ([facility] [address] 10/01/2022      Mellissa James MD  Past Medical History:   Diagnosis Date    Arthritis     Chronic pain     \"my right side has been hurting for 3-4 months\"    Hypertension       Past Surgical History:   Procedure Laterality Date    HX BREAST BIOPSY Bilateral     neg    HX BREAST REDUCTION Bilateral 2000    HX GI  2004    colon resection after perforation during ovarian cyst removal    HX HEENT      skin cyst removed from neck    HX HEENT      uvuloplasty    HX HYSTERECTOMY  1982    and bladder repair    HX SHOULDER ARTHROSCOPY      right    HX UROLOGICAL      bladder repair during hysterectomy     Allergies   Allergen Reactions    Iron Other (comments)     IV IRON only gave her convulsions      No family history on file.    Current Facility-Administered Medications   Medication Dose Route Frequency    NOREPINephrine (LEVOPHED) 8 mg in 5% dextrose 250mL (32 mcg/mL) infusion  0.5-30 mcg/min IntraVENous TITRATE    amiodarone (CORDARONE) 375 mg/250 mL D5W infusion  0.5-1 mg/min IntraVENous TITRATE    propofol (DIPRIVAN) 10 mg/mL infusion  0-50 mcg/kg/min IntraVENous TITRATE    fentaNYL (PF) 1,500 mcg/30 mL (50 mcg/mL) infusion  0-200 mcg/hr IntraVENous TITRATE    0.9% sodium chloride infusion  75 mL/hr IntraVENous CONTINUOUS    sodium chloride (NS) flush 5-40 mL  5-40 mL IntraVENous Q8H    sodium chloride (NS) flush 5-40 mL  5-40 mL IntraVENous PRN    acetaminophen (TYLENOL) tablet 650 mg  650 mg Oral Q6H PRN    Or    acetaminophen (TYLENOL) suppository 650 mg  650 mg Rectal Q6H PRN    polyethylene glycol (MIRALAX) packet 17 g  17 g Oral DAILY    senna-docusate (PERICOLACE) 8.6-50 mg per tablet 1 Tablet  1 Tablet Oral BID    magnesium hydroxide (MILK OF MAGNESIA) 400 mg/5 mL oral suspension 30 mL  30 mL Oral DAILY PRN    sodium phosphate (FLEET'S) enema 1 Enema  1 Enema Rectal DAILY PRN    ondansetron (ZOFRAN) injection 4 mg  4 mg IntraVENous Q6H PRN    acetaminophen (TYLENOL) tablet 1,000 mg  1,000 mg Oral Q6H    heparin 25,000 units in D5W 250 ml infusion  12-25 Units/kg/hr IntraVENous TITRATE    chlorhexidine (PERIDEX) 0.12 % mouthwash 15 mL  15 mL Oral Q12H    famotidine (PEPCID) tablet 20 mg  20 mg Oral DAILY    magnesium sulfate 2 g/50 ml IVPB (premix or compounded)  2 g IntraVENous ONCE    potassium chloride 20 mEq in 50 ml IVPB  20 mEq IntraVENous Q1H        Prior to Admission Medications:  Prior to Admission medications    Medication Sig Start Date End Date Taking? Authorizing Provider   metoprolol succinate (TOPROL-XL) 25 mg XL tablet Take  by mouth daily. Provider, Historical   progesterone (PROMETRIUM) 100 mg capsule Take 100 mg by mouth daily. Provider, Historical   losartan (COZAAR) 25 mg tablet Take  by mouth daily. Provider, Historical   potassium chloride (KLOR-CON M20 PO) Take  by mouth daily. Provider, Historical   hydroCHLOROthiazide (HYDRODIURIL) 25 mg tablet Take 25 mg by mouth daily.     Provider, Historical   valacyclovir HCl (VALACYCLOVIR PO) Take  by mouth daily as needed (herpes virus). Provider, Historical   cholecalciferol, VITAMIN D3, (VITAMIN D3) 5,000 unit tab tablet Take  by mouth daily. Provider, Historical   rifAXIMin (XIFAXAN) 550 mg tablet Take 550 mg by mouth two (2) times a day. Provider, Historical   dicyclomine (BENTYL) 10 mg capsule Take 10 mg by mouth daily. Provider, Historical   raNITIdine hcl 150 mg capsule Take 150 mg by mouth daily. Provider, Historical   multivitamin with minerals (ONE-A-DAY 50 PLUS PO) Take 1 Tab by mouth daily. Provider, Historical   calcium polycarbophil (FIBERCON) 625 mg tablet Take 625 mg by mouth daily. Provider, Historical   melatonin 3 mg tablet Take  by mouth nightly. Provider, Historical        Review of Symptoms:  Except as noted in HPI, patient denies recent fever or chills, nausea, vomiting, diarrhea, hemoptysis, hematemesis, dysuria, myalgias, focal neurologic symptoms, ecchymosis, angioedema, odynophagia, dysphagia, sore throat, earache,rash, melena, hematochezia, depression, GERD, cold intolerance, petechia, bleeding gums, or significant weight loss. Review of systems not obtained due to patient factors.      Subjective:    24 hr VS reviewed, overall VSSAF  Temp (24hrs), Av.2 °F (36.2 °C), Min:95.4 °F (35.2 °C), Max:98.8 °F (37.1 °C)    Patient Vitals for the past 8 hrs:   Pulse   10/02/22 1300 (!) 55   10/02/22 1229 (!) 54   10/02/22 1200 (!) 55   10/02/22 1100 (!) 54   10/02/22 1000 (!) 53   10/02/22 0900 62   10/02/22 0800 (!) 54   10/02/22 0728 (!) 52   10/02/22 0700 (!) 46   10/02/22 0600 (!) 46    Patient Vitals for the past 8 hrs:   Resp   10/02/22 1300 17   10/02/22 1229 21   10/02/22 1200 12   10/02/22 1100 14   10/02/22 1000 16   10/02/22 0900 19   10/02/22 0800 12   10/02/22 0728 23   10/02/22 0700 14   10/02/22 0600 15    Patient Vitals for the past 8 hrs:   BP   10/02/22 1300 120/62   10/02/22 1200 106/68   10/02/22 1100 112/65   10/02/22 1000 (!) 101/55   10/02/22 6761 (!) 100/57   10/02/22 0900 121/67   10/02/22 0800 125/68   10/02/22 0700 (!) 100/57   10/02/22 0600 (!) 79/45          Intake/Output Summary (Last 24 hours) at 10/2/2022 1347  Last data filed at 10/2/2022 1200  Gross per 24 hour   Intake 807.42 ml   Output --   Net 807.42 ml         Physical Exam (complete single organ system exam)      Visit Vitals  /62   Pulse (!) 55   Temp 97.7 °F (36.5 °C)   Resp 17   Ht 5' 3\" (1.6 m)   Wt 105 lb 13.1 oz (48 kg)   SpO2 100%   BMI 18.75 kg/m²     General Appearance:  Well developed, well nourished, resting on vent and not responsive, and individual in no acute distress. Ears/Nose/Mouth/Throat:   Hearing grossly normal.         Neck: Supple. Chest:   Lungs clear to auscultation bilaterally. Cardiovascular:  Regular rate and rhythm, S1, S2 normal, no murmur. Abdomen:   Soft, non-tender, bowel sounds are active. Extremities: No edema bilaterally. Skin: Warm and dry.                Cardiographics    Telemetry: normal sinus rhythm  ECG: normal EKG, normal sinus rhythm, unchanged from previous tracings  Echocardiogram: Not done    Labs:   Recent Results (from the past 24 hour(s))   BLOOD GAS,CHEM8,LACTIC ACID POC    Collection Time: 10/01/22  9:18 PM   Result Value Ref Range    Calcium, ionized (POC) 0.96 (L) 1.12 - 1.32 mmol/L    BICARBONATE 16 mmol/L    Base deficit (POC) 8.4 mmol/L    Sample source VENOUS BLOOD      CO2, POC 17 (L) 19 - 24 MMOL/L    Sodium,  136 - 145 MMOL/L    Potassium, POC 3.2 (L) 3.5 - 5.5 MMOL/L    Chloride,  100 - 108 MMOL/L    Glucose,  (H) 74 - 106 MG/DL    Creatinine, POC 1.5 (H) 0.6 - 1.3 MG/DL    Lactic Acid (POC) 4.39 (HH) 0.40 - 2.00 mmol/L    pH, venous (POC) 7.33 7.32 - 7.42      pCO2, venous (POC) 30.9 (L) 41 - 51 MMHG    pO2, venous (POC) 75 (H) 25 - 40 mmHg   SAMPLES BEING HELD    Collection Time: 10/01/22  9:23 PM   Result Value Ref Range    SAMPLES BEING HELD 1RED,1SST,1QNS RUTH TOP     COMMENT Add-on orders for these samples will be processed based on acceptable specimen integrity and analyte stability, which may vary by analyte. TROPONIN-HIGH SENSITIVITY    Collection Time: 10/01/22  9:23 PM   Result Value Ref Range    Troponin-High Sensitivity 271 (HH) 0 - 51 ng/L   CBC WITH AUTOMATED DIFF    Collection Time: 10/01/22  9:23 PM   Result Value Ref Range    WBC 6.5 3.6 - 11.0 K/uL    RBC 5.22 (H) 3.80 - 5.20 M/uL    HGB 12.8 11.5 - 16.0 g/dL    HCT 41.8 35.0 - 47.0 %    MCV 80.1 80.0 - 99.0 FL    MCH 24.5 (L) 26.0 - 34.0 PG    MCHC 30.6 30.0 - 36.5 g/dL    RDW 12.8 11.5 - 14.5 %    PLATELET 768 (L) 949 - 400 K/uL    MPV 12.9 8.9 - 12.9 FL    NRBC 0.0 0  WBC    ABSOLUTE NRBC 0.00 0.00 - 0.01 K/uL    NEUTROPHILS 56 32 - 75 %    BAND NEUTROPHILS 1 0 - 6 %    LYMPHOCYTES 37 12 - 49 %    MONOCYTES 4 (L) 5 - 13 %    EOSINOPHILS 0 0 - 7 %    BASOPHILS 0 0 - 1 %    METAMYELOCYTES 2 (H) 0 %    IMMATURE GRANULOCYTES 0 %    ABS. NEUTROPHILS 3.7 1.8 - 8.0 K/UL    ABS. LYMPHOCYTES 2.4 0.8 - 3.5 K/UL    ABS. MONOCYTES 0.3 0.0 - 1.0 K/UL    ABS. EOSINOPHILS 0.0 0.0 - 0.4 K/UL    ABS. BASOPHILS 0.0 0.0 - 0.1 K/UL    ABS. IMM. GRANS. 0.0 K/UL    DF MANUAL      RBC COMMENTS OVALOCYTES  PRESENT       METABOLIC PANEL, COMPREHENSIVE    Collection Time: 10/01/22  9:23 PM   Result Value Ref Range    Sodium 134 (L) 136 - 145 mmol/L    Potassium 3.0 (L) 3.5 - 5.1 mmol/L    Chloride 104 97 - 108 mmol/L    CO2 16 (L) 21 - 32 mmol/L    Anion gap 14 5 - 15 mmol/L    Glucose 208 (H) 65 - 100 mg/dL    BUN 44 (H) 6 - 20 MG/DL    Creatinine 1.84 (H) 0.55 - 1.02 MG/DL    BUN/Creatinine ratio 24 (H) 12 - 20      GFR est AA 32 (L) >60 ml/min/1.73m2    GFR est non-AA 27 (L) >60 ml/min/1.73m2    Calcium 8.4 (L) 8.5 - 10.1 MG/DL    Bilirubin, total 2.0 (H) 0.2 - 1.0 MG/DL    ALT (SGPT) 172 (H) 12 - 78 U/L    AST (SGOT) 203 (H) 15 - 37 U/L    Alk.  phosphatase 72 45 - 117 U/L    Protein, total 5.5 (L) 6.4 - 8.2 g/dL    Albumin 2.8 (L) 3.5 - 5.0 g/dL    Globulin 2.7 2.0 - 4.0 g/dL    A-G Ratio 1.0 (L) 1.1 - 2.2     NT-PRO BNP    Collection Time: 10/01/22  9:23 PM   Result Value Ref Range    NT pro-BNP 1,265 (H) <450 PG/ML   MAGNESIUM    Collection Time: 10/01/22  9:23 PM   Result Value Ref Range    Magnesium 1.4 (L) 1.6 - 2.4 mg/dL   ETHYL ALCOHOL    Collection Time: 10/01/22  9:23 PM   Result Value Ref Range    ALCOHOL(ETHYL),SERUM <10 <10 MG/DL   URINALYSIS W/MICROSCOPIC    Collection Time: 10/01/22  9:56 PM   Result Value Ref Range    Color YELLOW/STRAW      Appearance HAZY (A) CLEAR      Specific gravity 1.020 1.003 - 1.030      pH (UA) 6.0 5.0 - 8.0      Protein 100 (A) NEG mg/dL    Glucose Negative NEG mg/dL    Ketone Negative NEG mg/dL    Bilirubin Negative NEG      Blood MODERATE (A) NEG      Urobilinogen 0.2 0.2 - 1.0 EU/dL    Nitrites Negative NEG      Leukocyte Esterase Negative NEG      WBC 10-20 0 - 4 /hpf    RBC 5-10 0 - 5 /hpf    Epithelial cells MODERATE (A) FEW /lpf    Bacteria 2+ (A) NEG /hpf    Hyaline cast >20 (H) 0 - 5 /lpf    Yeast PRESENT (A) NEG     URINE CULTURE HOLD SAMPLE    Collection Time: 10/01/22  9:56 PM    Specimen: Serum; Urine   Result Value Ref Range    Urine culture hold        Urine on hold in Microbiology dept for 2 days. If unpreserved urine is submitted, it cannot be used for addtional testing after 24 hours, recollection will be required.    BLOOD GAS, ARTERIAL    Collection Time: 10/01/22 10:13 PM   Result Value Ref Range    pH 7.46 (H) 7.35 - 7.45      PCO2 23 (L) 35 - 45 mmHg    PO2 480 (H) 80 - 100 mmHg    O2  (H) 92 - 97 %    BICARBONATE 16 (L) 22 - 26 mmol/L    BASE DEFICIT 5.8 mmol/L    O2 METHOD VENT      FIO2 100 %    Tidal volume 350.0      SET RATE 18      PEEP/CPAP 5.0      Sample source ARTERIAL      SITE RIGHT RADIAL      SCOUT'S TEST YES     LACTIC ACID    Collection Time: 10/02/22  6:53 AM   Result Value Ref Range    Lactic acid 1.8 0.4 - 2.0 MMOL/L   CBC WITH AUTOMATED DIFF Collection Time: 10/02/22  6:53 AM   Result Value Ref Range    WBC 15.2 (H) 3.6 - 11.0 K/uL    RBC 5.04 3.80 - 5.20 M/uL    HGB 12.3 11.5 - 16.0 g/dL    HCT 39.0 35.0 - 47.0 %    MCV 77.4 (L) 80.0 - 99.0 FL    MCH 24.4 (L) 26.0 - 34.0 PG    MCHC 31.5 30.0 - 36.5 g/dL    RDW 12.9 11.5 - 14.5 %    PLATELET 080 (L) 047 - 400 K/uL    MPV 12.4 8.9 - 12.9 FL    NRBC 0.0 0  WBC    ABSOLUTE NRBC 0.00 0.00 - 0.01 K/uL    NEUTROPHILS 90 (H) 32 - 75 %    LYMPHOCYTES 4 (L) 12 - 49 %    MONOCYTES 5 5 - 13 %    EOSINOPHILS 0 0 - 7 %    BASOPHILS 0 0 - 1 %    IMMATURE GRANULOCYTES 1 (H) 0.0 - 0.5 %    ABS. NEUTROPHILS 13.6 (H) 1.8 - 8.0 K/UL    ABS. LYMPHOCYTES 0.6 (L) 0.8 - 3.5 K/UL    ABS. MONOCYTES 0.8 0.0 - 1.0 K/UL    ABS. EOSINOPHILS 0.0 0.0 - 0.4 K/UL    ABS. BASOPHILS 0.0 0.0 - 0.1 K/UL    ABS. IMM. GRANS. 0.2 (H) 0.00 - 0.04 K/UL    DF SMEAR SCANNED      RBC COMMENTS MICROCYTOSIS  1+        RBC COMMENTS HYPOCHROMIA  1+        RBC COMMENTS OVALOCYTES  PRESENT       TROPONIN-HIGH SENSITIVITY    Collection Time: 10/02/22  6:53 AM   Result Value Ref Range    Troponin-High Sensitivity 1,352 (HH) 0 - 51 ng/L   METABOLIC PANEL, COMPREHENSIVE    Collection Time: 10/02/22  6:53 AM   Result Value Ref Range    Sodium 134 (L) 136 - 145 mmol/L    Potassium 2.5 (LL) 3.5 - 5.1 mmol/L    Chloride 103 97 - 108 mmol/L    CO2 21 21 - 32 mmol/L    Anion gap 10 5 - 15 mmol/L    Glucose 249 (H) 65 - 100 mg/dL    BUN 44 (H) 6 - 20 MG/DL    Creatinine 1.67 (H) 0.55 - 1.02 MG/DL    BUN/Creatinine ratio 26 (H) 12 - 20      GFR est AA 36 (L) >60 ml/min/1.73m2    GFR est non-AA 30 (L) >60 ml/min/1.73m2    Calcium 8.2 (L) 8.5 - 10.1 MG/DL    Bilirubin, total 2.6 (H) 0.2 - 1.0 MG/DL    ALT (SGPT) 154 (H) 12 - 78 U/L    AST (SGOT) 189 (H) 15 - 37 U/L    Alk.  phosphatase 75 45 - 117 U/L    Protein, total 5.4 (L) 6.4 - 8.2 g/dL    Albumin 2.9 (L) 3.5 - 5.0 g/dL    Globulin 2.5 2.0 - 4.0 g/dL    A-G Ratio 1.2 1.1 - 2.2     MAGNESIUM    Collection Time: 10/02/22  6:53 AM   Result Value Ref Range    Magnesium 1.4 (L) 1.6 - 2.4 mg/dL   PHOSPHORUS    Collection Time: 10/02/22  6:53 AM   Result Value Ref Range    Phosphorus 4.7 2.6 - 4.7 MG/DL   ECHO ADULT COMPLETE    Collection Time: 10/02/22  9:53 AM   Result Value Ref Range    IVSd 0.9 0.6 - 0.9 cm    LVIDd 6.1 (A) 3.9 - 5.3 cm    LVIDs 5.6 cm    LVPWd 0.9 0.6 - 0.9 cm    EF BP 25 (A) 55 - 100 %    LV Ejection Fraction A2C 24 %    LV Ejection Fraction A4C 25 %    LV EDV A2C 106 mL    LV EDV A4C 117 mL    LV EDV  (A) 56 - 104 mL    LV ESV A2C 81 mL    LV ESV A4C 88 mL    LV ESV BP 88 (A) 19 - 49 mL    LVOT Peak Gradient 2 mmHg    LVOT Peak Velocity 0.7 m/s    RVIDd 2.5 cm    LA Diameter 2.9 cm    AR PHT 9,873.7 millisecond    AR Max Velocity PISA 2.7 m/s    AV Peak Gradient 3 mmHg    AV Peak Velocity 0.8 m/s    MV A Velocity 0.69 m/s    MV E Wave Deceleration Time 251.8 ms    MV E Velocity 0.22 m/s    LV E' Lateral Velocity 2 cm/s    LV E' Septal Velocity 2 cm/s    MV PHT 73.0 ms    MV Area by PHT 3.0 cm2    Pulmonary Artery EDP 22 mmHg    Pulmonary Artery EDP 12 mmHg    TN Max Velocity 1.7 m/s    PV Peak Gradient 3 mmHg    PV Max Velocity 0.8 m/s    TAPSE 1.4 (A) 1.7 cm    TR Peak Gradient 28 mmHg    TR Max Velocity 2.64 m/s    Aortic Root 3.1 cm    Fractional Shortening 2D 8 28 - 44 %    LV ESV Index BP 59 mL/m2    LV EDV Index BP 78 mL/m2    LV ESV Index A4C 59 mL/m2    LV EDV Index A4C 79 mL/m2    LV ESV Index A2C 55 mL/m2    LV EDV Index A2C 72 mL/m2    LVIDd Index 4.12 cm/m2    LVIDs Index 3.78 cm/m2    LV RWT Ratio 0.30     LV Mass 2D 222.0 (A) 67 - 162 g    LV Mass 2D Index 150.0 (A) 43 - 95 g/m2    MV E/A 0.32     E/E' Ratio (Averaged) 11.00     E/E' Lateral 11.00     E/E' Septal 11.00     LA Size Index 1.96 cm/m2    LA/AO Root Ratio 0.94     Ao Root Index 2.09 cm/m2    AV Velocity Ratio 0.88    PTT    Collection Time: 10/02/22 12:00 PM   Result Value Ref Range    aPTT 81.3 (H) 22.1 - 31.0 sec aPTT, therapeutic range     58.0 - 77.0 SECS        Assessment:     Assessment:   Cardiac arrest; she sustained out of hospital sudden cardiac cath;    Anoxic encephalopathy; not sure how much  Cardiomyopathy; severe  Cardiogenic shock; post arrest  Vfib; one time in ER; was briefly on Amiodarone     Plan:   CCU  Replace electrolytes  Echo  Levophed to support pressure    Michel Beach MD

## 2022-10-02 NOTE — PROGRESS NOTES
0230: Report received from [facility] RN    Primary Nurse Daily Barkley, RN and Rula MCCLELLAN, RN performed a dual skin assessment on this patient No impairment noted    Current Bed:     UZMA Donohue    Gregor score is 19    GTTs verified on admission:  Levo at 10, amio at 0.5    0300: Orders for fentanyl and propfol gtt received and gtts started. 0430: Dr. Madiha Bar at bedside, L triple lumen IJ placed. 0630: Pt bradycardic, amio gtt stopped per Dr. Daisy Torres. 0730:  Bedside report given to [facility]

## 2022-10-02 NOTE — PROGRESS NOTES
Ultrasound IV by Romina Malhotra RN :  Procedure Note    Patient meets criteria for US IV insertion. Ultrasound IV education provided to patient. Opportunities for questions given. Ultrasound used for PIV placement:  20gauge 8 cm Arrow Endurance Extended Dwell(may stay in place for 29 days)  R basilic location. 1 X Attempt(s). Flushed with ease; vigorous blood return. Procedure tolerated well. Primary RN aware of IV placement and added to LDA.       Romina Malhotra RN

## 2022-10-02 NOTE — Clinical Note
Safe sheath split and removed. 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 He needs an urgent\emergent GI procedure. Therefore given that he is >30 days post PCI he will need to stop Brilinta and continue on ASA. Further cardiac workup will depend on clinical course.

## 2022-10-02 NOTE — PROGRESS NOTES
D/w ED physician - Dr Jim Jean Baptiste  Pt came to ED s/p ROSC after cardiac arrest - bystander CPR/1 shock. Started on amio gtt/ Hypotensive - started on levophed gtt  Low K/Ca - being corrected  EKG not c/w STEMI ; prolonged QTc  Avoid QT prolonging meds/ Start heparin gtt  Full consult to follow    Ashok Garcia.  MD, [facility]

## 2022-10-02 NOTE — ED TRIAGE NOTES
Pt arrived from home via EMS after \"passing out\" while eating dinner with her . EMS states they found her to be in cardiac arrest, took over for bystander CPR. Pt arrived with LMA in place, 20 G peripheral IV in L AC, and IO in R tibia. Pt received 50 mg ketamine in the field for sedation, no paralytics given. Pt is unresponsive, with organized cardiac activity on the monitor.

## 2022-10-02 NOTE — PROGRESS NOTES
Attempted follow up visit with patient. She remains omn vent support and no family present. Request nurse to zoe salgado for further needs.    Chaplain Dodson MDiv, MS, [facility]

## 2022-10-02 NOTE — PROGRESS NOTES
Physical Therapy    Consult received and chart reviewed. Patient's troponin is trending up from 271 yesterday to 1352 today, so we will hold at this time until patient medically appropriate. Also note, patient with complete bed rest orders in chart at this time, will need to be removed if patient becomes appropriate for participation with therapy. Will follow up wit PT assessment when medically ready.     Chuck Beard, PT

## 2022-10-02 NOTE — PROGRESS NOTES
Occupational therapy  10/02/22     OT eval order received and acknowledged. Chart reviewed and noted patient troponin is up trending from 271 yesterday to 1352 today, will hold at this time until patient medically appropriate. Also note, patient with complete bed rest orders in chart at this time, will need to be removed if patient becomes appropriate for participation with therapy. Will continue to follow patient and attempt OT eval at a later time.      Thank you,  Heber Gill, OTR/L

## 2022-10-02 NOTE — H&P
CRITICAL CARE NOTE      Name: Gaby Villa   : [birth year]   MRN: [id number]   Date: 10/2/2022      REASON FOR ICU ADMISSION:  Cardiac Arrest    PRINCIPAL ICU DIAGNOSIS   Cardiac Arrest  NSTEMI  Alzheimer's Disease  HTN        HPI   77F with Alzheimer disease, HTN, presenting after witnessed cardiac arrest at home.  immediately started CPR, EMS then found Vifb with Shock x1, followed by asystole. ROSC < 5minutes reported. EMS transported to [facility] , [place] with pulse for 30 seconds started on amio gtt. CTH negative. Moved to [facility] CCU for further management. ASSESSMENT & PLAN     Neuro  Christos tylenol  Avoid Temps>38, cooling blanket. Icy cath if req  Daily SAT  CTH if change in neuro status  EEG in AM  Neuron Specific Enolase at 48, 72 hours    Cardiac  Telemetry  EKG, Last Qtc pending  Goal MAP>65, I am actively titrating pressors to goal  TTE pending  Etiology of arrest - aspiration vs TdP?  With prolonged QT    Pulmonary  Head of Bed >30  IS   Goal SpO2 >92%, I am actively titrating oxygen to goal  Nebs PRN  CXR  ABG q6    GI  NPO  Begin TF if no neuro improvement    Renal  Monitor electrolytes, K>4, Mg>2  Kruse  Strict I/O    Endocrine  SSI   Goal glucose 140-180      ID  No antibiotics indicated  Follow blood cultures    Heme  Hep gtt for NSTEMI    Lines- PIV, Arterial line  Kruse - +  DVT- Hep gtt  SUP - yes  Diet - DIET NPO  Mobility - 0  Pt Contact - Family  Dispo - ICU    Code Status - FULL            OBJECTIVE     Labs and Data: Reviewed 10/02/22  Medications: Reviewed 10/02/22  Imaging: Reviewed 10/02/22    Visit Vitals  /72 (BP 1 Location: Left upper arm, BP Patient Position: At rest)   Pulse 68   Temp 97.7 °F (36.5 °C)   Resp 16   SpO2 97%        Temp (24hrs), Av.4 °F (35.8 °C), Min:95.4 °F (35.2 °C), Max:97.7 °F (36.5 °C)             Intake/Output:   No intake or output data in the 24 hours ending 10/02/22 0321      General - critically ill, intubated  HEENT- pupils equal, EOMI, anicteric  Neuro - posturing with arms flex, neck extended, does not follow basic commands, no WD to pain  CV - RRR  Resp - CTAB, ETT+  Abd - soft, nontender, no guarding   - + valdez  MSK- intact, no sacral ulcer    All Micro Results       Procedure Component Value Units Date/Time    CULTURE, BLOOD #1 [[id number]]     Order Status: Sent Specimen: Blood     CULTURE, BLOOD #2 [[id number]]     Order Status: Sent Specimen: Blood              Imaging  Key imaging reviewed           CRITICAL CARE DOCUMENTATION  I had a face to face encounter with the patient, reviewed and interpreted patient data including clinical events, labs, images, vital signs, I/O's, and examined patient. I have discussed the case and the plan and management of the patient's care with the consulting services, the bedside nurses and the respiratory therapist.      NOTE OF PERSONAL INVOLVEMENT IN CARE   This patient has a high probability of imminent, clinically significant deterioration, which requires the highest level of preparedness to intervene urgently. I participated in the decision-making and personally managed or directed the management of the following life and organ supporting interventions that required my frequent assessment to treat or prevent imminent deterioration. I personally spent 90 minutes of critical care time. This is time spent at this critically ill patient's bedside actively involved in patient care as well as the coordination of care. This does not include any procedural time which has been billed separately.     Angela Ny DO  Staff [facility] Critical Care  10/2/2022

## 2022-10-02 NOTE — PROGRESS NOTES
Renal Dosing/Monitoring  Medication: Famotidine   Current regimen:  20 mg PO BID  Recent Labs     10/01/22  2123   CREA 1.84*   BUN 44*     Estimated CrCl:  19.5 ml/min  Plan: Change to 20 mg PO daily per [facility] P&T Committee Protocol with respect to renal function. Pharmacy will continue to monitor patient daily and will make dosage adjustments based upon changing renal function.

## 2022-10-02 NOTE — H&P
Hospitalist Admission Note    NAME:  Latricia Donnelly   :  [id number]   MRN:  [id number]     Date/Time:  10/1/2022 11:20 PM    Patient PCP: Danuta Parham MD  ________________________________________________________________________    Given the patient's current clinical presentation, I have a high level of concern for decompensation if discharged from the emergency department. Complex decision making was performed, which includes reviewing the patient's available past medical records, laboratory results, and x-ray films. My assessment of this patient's clinical condition and my plan of care is as follows. Assessment / Plan:    1. Cardiac Arrest:    The patient comes in w/ cardiac arrest s/p CPR noted to be in Vfib s/p defibrillation. VS - /103  WBC normal, Hg 12.8, LA 4.39  K 3  BUN 44, Cr 1.84, Mg 1.4  , , AlkP 72  hsTrop 271  BNP 1265  Head CT - mild central atrophy, no acute findings\  CXR - clear    Admit and cont to monitor in ICU  Obtain EKG  Repeat hsTrop and obtain Echo  Cont w/ Amiodarone  Start Heparin drop  Consult Cardiology    2. Acute Respiratory Failure:    Cont w/ Vent support - , RR 14, FiO2 40%, PEEP 5  Consult Intensivist for vent management    3. Shock secondary to cardiac arrest:    Cont w/ Norepi 4mcg/min  Wean as BP tolerates for MAP >65    4. HTN:    Hold Toprol XL 25mg daily, Cozaar 25mg daily, HCTZ 25mg daily    5. Hypokalemia:    Replete w/ IV K    6. Hypomagnesemia:    Replete w/ IV Mg    7. Elevated Liver Enzymes:    Repeat CMP in AM and re-evaluate    Body mass index is 19.66 kg/m².:  18.5 - 24.9:  Normal weight      I have personally reviewed the radiographs, laboratory data in Epic and decisions and statements above are based partially on this personal interpretation. Code Status: Full Code   Surrogate Decision Maker  Unable to obtain - patient is intubated/sedated.   No family at the bedside    Prophylaxis:  Heparin drip     Subjective:   CHIEF COMPLAINT: Unable to obtain - Patient is intubated    HISTORY OF PRESENT ILLNESS:       The patient is a 66 y/o AA F w/ PMH Alzheimer's dementia who is brought into the ED today due to syncope while at the dinner table. EMS noted her to be in cardiac arrest and started CPR. She was noted to be in Vfib s/p defibrillation. She is unable to give any history and there is no family at the bedside. Past Medical History:   Diagnosis Date    Arthritis     Chronic pain     \"my right side has been hurting for 3-4 months\"    Hypertension       Past Surgical History:   Procedure Laterality Date    HX BREAST BIOPSY Bilateral     neg    HX BREAST REDUCTION Bilateral 2000    HX GI  2004    colon resection after perforation during ovarian cyst removal    HX HEENT      skin cyst removed from neck    HX HEENT      uvuloplasty    HX HYSTERECTOMY  1982    and bladder repair    HX SHOULDER ARTHROSCOPY      right    HX UROLOGICAL      bladder repair during hysterectomy          Social Hx:    Unable to obtain as patient is unable to give any history    FH:    Unable to obtain as patient is unable to give any history      Allergies   Allergen Reactions    Iron Other (comments)     IV IRON only gave her convulsions        Prior to Admission medications    Medication Sig Start Date End Date Taking? Authorizing Provider   metoprolol succinate (TOPROL-XL) 25 mg XL tablet Take  by mouth daily. Provider, Historical   progesterone (PROMETRIUM) 100 mg capsule Take 100 mg by mouth daily. Provider, Historical   losartan (COZAAR) 25 mg tablet Take  by mouth daily. Provider, Historical   potassium chloride (KLOR-CON M20 PO) Take  by mouth daily. Provider, Historical   hydroCHLOROthiazide (HYDRODIURIL) 25 mg tablet Take 25 mg by mouth daily. Provider, Historical   valacyclovir HCl (VALACYCLOVIR PO) Take  by mouth daily as needed (herpes virus).     Provider, Historical   cholecalciferol, VITAMIN D3, (VITAMIN D3) 5,000 unit tab tablet Take  by mouth daily. Provider, Historical   rifAXIMin (XIFAXAN) 550 mg tablet Take 550 mg by mouth two (2) times a day. Provider, Historical   dicyclomine (BENTYL) 10 mg capsule Take 10 mg by mouth daily. Provider, Historical   raNITIdine hcl 150 mg capsule Take 150 mg by mouth daily. Provider, Historical   multivitamin with minerals (ONE-A-DAY 50 PLUS PO) Take 1 Tab by mouth daily. Provider, Historical   calcium polycarbophil (FIBERCON) 625 mg tablet Take 625 mg by mouth daily. Provider, Historical   melatonin 3 mg tablet Take  by mouth nightly. Provider, Historical       REVIEW OF SYSTEMS:  See HPI for details    Patient was not able to provide review of systems due to intubation/sedated.     Objective:   VITALS:    Visit Vitals  BP (!) 178/86   Pulse (!) 107   Temp (!) 96.1 °F (35.6 °C)   Resp 14   Ht 5' 3\" (1.6 m)   Wt 50.3 kg (111 lb)   SpO2 100%   BMI 19.66 kg/m²     PHYSICAL EXAM:    Physical Exam:    Gen: Well-developed, well-nourished,   HEENT:  Pink conjunctivae, PERRL, atraumatic  Neck: Supple, without masses, thyroid non-tender  Resp: No accessory muscle use, clear breath sounds without wheezes rales or rhonchi  Card: No murmurs, normal S1, S2 without thrills, bruits or peripheral edema  Abd:  Soft, non-tender, non-distended, normoactive bowel sounds are present, no palpable organomegaly and no detectable hernias  Lymph:  No cervical or inguinal adenopathy  Musc: No cyanosis or clubbing  Skin: No rashes or ulcers, skin turgor is good  Neuro:  Unable to obtain as patient is sedated/intubated  Psych:  Unable to obtain as patient is sedated/intubated  _______________________________________________________________________  Care Plan discussed with:  Pt's condition, Imaging findings, Lab findings, Assessment, and Care Plan discussed with: RN  _______________________________________________________________________    Probable disposition: Home  ________________________________________________________________________      Total critical care time was 30 minutes in direct patient care with this critically ill patient.  (1st 30-74min: 20048)      Comments   >50% of visit spent in counseling and coordination of care  Chart reviewed  Discussion with patient and/or family and questions answered     ________________________________________________________________________  Signed: Alex Casey MD        Procedures: see electronic medical records for all procedures/Xrays and details which were not copied into this note but were reviewed prior to creation of Plan. LAB DATA REVIEWED:    Recent Results (from the past 24 hour(s))   BLOOD GAS,CHEM8,LACTIC ACID POC    Collection Time: 10/01/22  9:18 PM   Result Value Ref Range    Calcium, ionized (POC) 0.96 (L) 1.12 - 1.32 mmol/L    BICARBONATE 16 mmol/L    Base deficit (POC) 8.4 mmol/L    Sample source VENOUS BLOOD      CO2, POC 17 (L) 19 - 24 MMOL/L    Sodium,  136 - 145 MMOL/L    Potassium, POC 3.2 (L) 3.5 - 5.5 MMOL/L    Chloride,  100 - 108 MMOL/L    Glucose,  (H) 74 - 106 MG/DL    Creatinine, POC 1.5 (H) 0.6 - 1.3 MG/DL    Lactic Acid (POC) 4.39 (HH) 0.40 - 2.00 mmol/L    pH, venous (POC) 7.33 7.32 - 7.42      pCO2, venous (POC) 30.9 (L) 41 - 51 MMHG    pO2, venous (POC) 75 (H) 25 - 40 mmHg   SAMPLES BEING HELD    Collection Time: 10/01/22  9:23 PM   Result Value Ref Range    SAMPLES BEING HELD 1RED,1SST,1QNS RUTH TOP     COMMENT        Add-on orders for these samples will be processed based on acceptable specimen integrity and analyte stability, which may vary by analyte.    TROPONIN-HIGH SENSITIVITY    Collection Time: 10/01/22  9:23 PM   Result Value Ref Range    Troponin-High Sensitivity 271 (HH) 0 - 51 ng/L   CBC WITH AUTOMATED DIFF    Collection Time: 10/01/22  9:23 PM   Result Value Ref Range    WBC 6.5 3.6 - 11.0 K/uL    RBC 5.22 (H) 3.80 - 5.20 M/uL    HGB 12.8 11.5 - 16.0 g/dL HCT 41.8 35.0 - 47.0 %    MCV 80.1 80.0 - 99.0 FL    MCH 24.5 (L) 26.0 - 34.0 PG    MCHC 30.6 30.0 - 36.5 g/dL    RDW 12.8 11.5 - 14.5 %    PLATELET 679 (L) 903 - 400 K/uL    MPV 12.9 8.9 - 12.9 FL    NRBC 0.0 0  WBC    ABSOLUTE NRBC 0.00 0.00 - 0.01 K/uL    NEUTROPHILS 56 32 - 75 %    BAND NEUTROPHILS 1 0 - 6 %    LYMPHOCYTES 37 12 - 49 %    MONOCYTES 4 (L) 5 - 13 %    EOSINOPHILS 0 0 - 7 %    BASOPHILS 0 0 - 1 %    METAMYELOCYTES 2 (H) 0 %    IMMATURE GRANULOCYTES 0 %    ABS. NEUTROPHILS 3.7 1.8 - 8.0 K/UL    ABS. LYMPHOCYTES 2.4 0.8 - 3.5 K/UL    ABS. MONOCYTES 0.3 0.0 - 1.0 K/UL    ABS. EOSINOPHILS 0.0 0.0 - 0.4 K/UL    ABS. BASOPHILS 0.0 0.0 - 0.1 K/UL    ABS. IMM. GRANS. 0.0 K/UL    DF MANUAL      RBC COMMENTS OVALOCYTES  PRESENT       METABOLIC PANEL, COMPREHENSIVE    Collection Time: 10/01/22  9:23 PM   Result Value Ref Range    Sodium 134 (L) 136 - 145 mmol/L    Potassium 3.0 (L) 3.5 - 5.1 mmol/L    Chloride 104 97 - 108 mmol/L    CO2 16 (L) 21 - 32 mmol/L    Anion gap 14 5 - 15 mmol/L    Glucose 208 (H) 65 - 100 mg/dL    BUN 44 (H) 6 - 20 MG/DL    Creatinine 1.84 (H) 0.55 - 1.02 MG/DL    BUN/Creatinine ratio 24 (H) 12 - 20      GFR est AA 32 (L) >60 ml/min/1.73m2    GFR est non-AA 27 (L) >60 ml/min/1.73m2    Calcium 8.4 (L) 8.5 - 10.1 MG/DL    Bilirubin, total 2.0 (H) 0.2 - 1.0 MG/DL    ALT (SGPT) 172 (H) 12 - 78 U/L    AST (SGOT) 203 (H) 15 - 37 U/L    Alk.  phosphatase 72 45 - 117 U/L    Protein, total 5.5 (L) 6.4 - 8.2 g/dL    Albumin 2.8 (L) 3.5 - 5.0 g/dL    Globulin 2.7 2.0 - 4.0 g/dL    A-G Ratio 1.0 (L) 1.1 - 2.2     NT-PRO BNP    Collection Time: 10/01/22  9:23 PM   Result Value Ref Range    NT pro-BNP 1,265 (H) <450 PG/ML   MAGNESIUM    Collection Time: 10/01/22  9:23 PM   Result Value Ref Range    Magnesium 1.4 (L) 1.6 - 2.4 mg/dL   ETHYL ALCOHOL    Collection Time: 10/01/22  9:23 PM   Result Value Ref Range    ALCOHOL(ETHYL),SERUM <10 <10 MG/DL   URINALYSIS W/MICROSCOPIC    Collection Time: 10/01/22  9:56 PM   Result Value Ref Range    Color YELLOW/STRAW      Appearance HAZY (A) CLEAR      Specific gravity 1.020 1.003 - 1.030      pH (UA) 6.0 5.0 - 8.0      Protein 100 (A) NEG mg/dL    Glucose Negative NEG mg/dL    Ketone Negative NEG mg/dL    Bilirubin Negative NEG      Blood MODERATE (A) NEG      Urobilinogen 0.2 0.2 - 1.0 EU/dL    Nitrites Negative NEG      Leukocyte Esterase Negative NEG      WBC 10-20 0 - 4 /hpf    RBC 5-10 0 - 5 /hpf    Epithelial cells MODERATE (A) FEW /lpf    Bacteria 2+ (A) NEG /hpf    Hyaline cast >20 (H) 0 - 5 /lpf    Yeast PRESENT (A) NEG     URINE CULTURE HOLD SAMPLE    Collection Time: 10/01/22  9:56 PM    Specimen: Serum; Urine   Result Value Ref Range    Urine culture hold        Urine on hold in Microbiology dept for 2 days. If unpreserved urine is submitted, it cannot be used for addtional testing after 24 hours, recollection will be required.    BLOOD GAS, ARTERIAL    Collection Time: 10/01/22 10:13 PM   Result Value Ref Range    pH 7.46 (H) 7.35 - 7.45      PCO2 23 (L) 35 - 45 mmHg    PO2 480 (H) 80 - 100 mmHg    O2  (H) 92 - 97 %    BICARBONATE 16 (L) 22 - 26 mmol/L    BASE DEFICIT 5.8 mmol/L    O2 METHOD VENT      FIO2 100 %    Tidal volume 350.0      SET RATE 18      PEEP/CPAP 5.0      Sample source ARTERIAL      SITE RIGHT RADIAL      SCOUT'S TEST YES

## 2022-10-02 NOTE — PROGRESS NOTES
Notified of need for consult. Discussed with RN who found that patient is a patient of Dr. Deann Howard of [facility]. She will pass the consult on to S.

## 2022-10-02 NOTE — PROGRESS NOTES
0130 -TRANSFER - IN REPORT:    Verbal report received from Rizwana(name) on Amy Martinez  being received from [facility]- ER(unit) for routine progression of care      Report consisted of patients Situation, Background, Assessment and   Recommendations(SBAR). Information from the following report(s) SBAR, Kardex, ED Summary, Intake/Output, MAR, Accordion, Recent Results, Cardiac Rhythm -NSR, and Alarm Parameters  was reviewed with the receiving nurse. Opportunity for questions and clarification was provided. Assessment completed upon patients arrival to unit and care assumed. 0230 - Report given to Anirudh Santos RN.

## 2022-10-02 NOTE — ED NOTES
Levophed drip initiated @ 22:07, titrated up via verbal order from MD @ 22:16 to 10 mcg to achieve MAP of 65. At this time pt BP elevated markedly, drip titrated down to 8 mcg. Will continue to monitor. Drip titrated down to 4 @ 23:15.

## 2022-10-02 NOTE — PROCEDURES
Procedure Note - Central Venous Access:   Performed by Meenakshi Eden DO  Diagnosis: RRT  Insertion Date: 10/02/22  Time:9:57 AM  Obtained Consent? yes; informed   Procedure Location:  ICU    Immediately prior to the procedure, the patient was reevaluated and found suitable for the planned procedure and any planned medications. Immediately prior to the procedure a time out was called to verify the correct patient, procedure, equipment, staff, and marking as appropriate. Central line Bundle:  Full sterile barrier precautions used. 7-Step Sterility Protocol followed. (cap, mask sterile gown, sterile gloves, large sterile sheet, hand hygiene, 2% chlorhexidine for cutaneous antisepsis)  5 mL 1% Lidocaine placed at insertion site. Patient positioned in Trendelenburg? Yes  The site was prepped with {Chlorhexidine  Catheter inserted into a new site. Using Seldinger technique a Arrow Triple Lumen CVC was placed in the Right, Internal Jugular Vein via direct cannulation with 1 number of attempts. Ultrasound Guidance was utilized. There was good dark, non-pulsatile blood return in all ports. Catheter secured. Biopatch/CHG bio-occlusive dressing in place? Yes  The following complications were encountered: None. A follow-up chest x-ray is pending  The procedure was tolerated well. Monika Cardona DO  Critical Care Medicine  [facility]

## 2022-10-02 NOTE — Clinical Note
SBAR out    Verbal report, including SBAR, Kardex, Procedure Summary, MAR, given to CCU RN by Nicholas Miller RN. Pt transferred to CCU 18 for routine progression of care. Opportunity for questions and clarification provided. Pt in no acute distress and in stable condition at transfer of care.

## 2022-10-02 NOTE — PROGRESS NOTES
Reviewed chart and discussed case with nsg. Patient remains intubated at this time. Will follow for formal evaluation once patient medically stable. Thank you. Aminah Hutchins M.CD.  CCC-SLP

## 2022-10-02 NOTE — ED PROVIDER NOTES
Patient is a 74-year-old female presenting the emergency department for cardiac arrest.  Patient was at home with her family having dinner when she went unresponsive. Patient's  called 46 started doing bystander CPR EMS arrived patient was in V. fib gave 1 shock patient went into asystole they did 1 round of CPR with return of spontaneous circulation. Patient was started on an epinephrine drip supraglottic airway was obtained placed on vent without any difficulty. On arrival here patient was noted to go into V. tach with a pulse for approximately 30 seconds given 150 mg bolus amiodarone started on amiodarone drip. Patient subsequently started become hypotensive started on Levophed. Supraglottic airway removed exchanged for a 7 oh ET tube. Patient has a history of CHF  states she had recently been on diuretics and her cardiologist thought that she might be dehydrated. Past Medical History:   Diagnosis Date    Arthritis     Chronic pain     \"my right side has been hurting for 3-4 months\"    Hypertension        Past Surgical History:   Procedure Laterality Date    HX BREAST BIOPSY Bilateral     neg    HX BREAST REDUCTION Bilateral     HX GI  2004    colon resection after perforation during ovarian cyst removal    HX HEENT      skin cyst removed from neck    HX HEENT      uvuloplasty    HX HYSTERECTOMY      and bladder repair    HX SHOULDER ARTHROSCOPY      right    HX UROLOGICAL      bladder repair during hysterectomy         No family history on file.     Social History     Socioeconomic History    Marital status:      Spouse name: Not on file    Number of children: Not on file    Years of education: Not on file    Highest education level: Not on file   Occupational History    Not on file   Tobacco Use    Smoking status: Former     Packs/day: 0.25     Types: Cigarettes     Quit date: 26     Years since quittin.7    Smokeless tobacco: Never   Substance and Sexual Activity Alcohol use: No    Drug use: No    Sexual activity: Not on file   Other Topics Concern    Not on file   Social History Narrative    Not on file     Social Determinants of Health     Financial Resource Strain: Not on file   Food Insecurity: Not on file   Transportation Needs: Not on file   Physical Activity: Not on file   Stress: Not on file   Social Connections: Not on file   Intimate Partner Violence: Not on file   Housing Stability: Not on file         ALLERGIES: Iron    Review of Systems   Unable to perform ROS: Acuity of condition     Vitals:    10/01/22 2221 10/01/22 2230 10/01/22 2245 10/01/22 2250   BP:  (!) 106/57 (!) 151/80 (!) 148/72   Pulse:    78   Resp:    14   Temp:       SpO2: 97%   100%   Weight:       Height:                Physical Exam  Vitals and nursing note reviewed. Constitutional:       General: She is in acute distress. Appearance: She is toxic-appearing. HENT:      Head: Normocephalic and atraumatic. Mouth/Throat:      Mouth: Mucous membranes are moist.      Comments: Copious vomitus in the posterior airway requiring suctioning. Cardiovascular:      Rate and Rhythm: Normal rate and regular rhythm. Pulses: Normal pulses. Heart sounds: Normal heart sounds. Pulmonary:      Comments: Patient intubated with 7.0 ET tube  Abdominal:      General: Abdomen is flat. Palpations: Abdomen is soft. Musculoskeletal:      Cervical back: Normal range of motion and neck supple. Skin:     Coloration: Skin is pale. Neurological:      Mental Status: She is unresponsive. GCS: GCS eye subscore is 1. GCS verbal subscore is 1. GCS motor subscore is 1. MDM  Number of Diagnoses or Management Options  Cardiac arrest [facility])  Ventricular fibrillation ([facility] [address]  Diagnosis management comments: Cardiac arrest, ventricular fibrillation, ventricular tachycardia with pulse. 76-year-old female present emergency department after having out-of-hospital cardiac arrest with ROSC. Patient currently intubated on ventilator on amiodarone drip, Levophed drip. Patient started to become responsive, biting the tube placed on propofol drip for sedation with goal of RASS -2 to -3. Due to patient having out-of-hospital cardiac arrest with bystander CPR patient will be transferred to [facility] for code ice. Intubation    Date/Time: 10/2/2022 8:42 AM  Performed by: Cristiane Ramirez MD  Authorized by: Cristiane Ramirez MD     Consent:     Consent obtained:  Emergent situation  Pre-procedure details:     Patient status:  Unresponsive    Look externally: no concerns      Mouth opening - incisor distance:  3 or more finger widths    Hyoid-mental distance: 2 finger widths      Mallampati score:  III    Obstruction: none      Neck mobility: normal      Pharmacologic strategy: RSI      Paralytics:  Rocuronium  Procedure details:     Preoxygenation:  Bag valve mask    CPR in progress: no      Intubation method:  Oral    Intubation technique: video assisted      Laryngoscope blade: Mac 3    Bougie used: no      Grade view: II      Tube size (mm):  7.0    Tube type:  Cuffed    Number of attempts:  1    Ventilation between attempts: no      Tube visualized through cords: yes    Placement assessment:     ETT at teeth/gumline (cm):  21    Tube secured with:  ETT cox    Breath sounds:  Equal and absent over the epigastrium    Placement verification: chest rise, colorimetric ETCO2, CXR verification, direct visualization, endoscopic, equal breath sounds, esophageal detector and numeric ETCO2      CXR findings:  Appropriate position  Post-procedure details:     Procedure completion:  Tolerated    Initial EKG at 21: 12 hours shows a normal sinus rhythm with a rate of 67 hyperacute P waves in lead II no ST or T wave abnormalities biphasic ST segment in V4, V5. Subsequent EKG obtained at 22: 03 hours shows sinus rhythm with a rate of 61 prolonged QT/QTc at 640/644 MS.     Perfect Serve Consult for Admission  10:54 PM    ED Room Number: ER03/03  Patient Name and age:  Reina Corimer 65 y.o.  female  Working Diagnosis:   1. Cardiac arrest ([facility] [address]    2. Ventricular fibrillation ([facility] [address]        COVID-19 Suspicion:  no  Sepsis present:  no  Reassessment needed: yes  Code Status:  Full Code  Readmission: no  Isolation Requirements:  no  Recommended Level of Care:  ICU  Department:[facility] ED - [phone]  Other: Total critical care time (not including time spent performing separately reportable procedures): 75 min.

## 2022-10-03 LAB
ALBUMIN SERPL-MCNC: 3.3 G/DL (ref 3.5–5)
ALBUMIN/GLOB SERPL: 1.4 {RATIO} (ref 1.1–2.2)
ALP SERPL-CCNC: 81 U/L (ref 45–117)
ALT SERPL-CCNC: 97 U/L (ref 12–78)
ANION GAP SERPL CALC-SCNC: 8 MMOL/L (ref 5–15)
APTT PPP: 50.5 SEC (ref 22.1–31)
APTT PPP: 52.8 SEC (ref 22.1–31)
APTT PPP: 76.8 SEC (ref 22.1–31)
AST SERPL-CCNC: 55 U/L (ref 15–37)
ATRIAL RATE: 47 BPM
ATRIAL RATE: 60 BPM
ATRIAL RATE: 62 BPM
ATRIAL RATE: 67 BPM
BASOPHILS # BLD: 0 K/UL (ref 0–0.1)
BASOPHILS NFR BLD: 0 % (ref 0–1)
BILIRUB SERPL-MCNC: 1.9 MG/DL (ref 0.2–1)
BNP SERPL-MCNC: ABNORMAL PG/ML
BUN SERPL-MCNC: 34 MG/DL (ref 6–20)
BUN/CREAT SERPL: 24 (ref 12–20)
CALCIUM SERPL-MCNC: 8.6 MG/DL (ref 8.5–10.1)
CALCULATED P AXIS, ECG09: 82 DEGREES
CALCULATED P AXIS, ECG09: 85 DEGREES
CALCULATED R AXIS, ECG10: -20 DEGREES
CALCULATED R AXIS, ECG10: -35 DEGREES
CALCULATED R AXIS, ECG10: -83 DEGREES
CALCULATED R AXIS, ECG10: 85 DEGREES
CALCULATED T AXIS, ECG11: -125 DEGREES
CALCULATED T AXIS, ECG11: -134 DEGREES
CALCULATED T AXIS, ECG11: 104 DEGREES
CALCULATED T AXIS, ECG11: 124 DEGREES
CHLORIDE SERPL-SCNC: 104 MMOL/L (ref 97–108)
CO2 SERPL-SCNC: 22 MMOL/L (ref 21–32)
CREAT SERPL-MCNC: 1.41 MG/DL (ref 0.55–1.02)
DIAGNOSIS, 93000: NORMAL
DIFFERENTIAL METHOD BLD: ABNORMAL
ECHO AO ROOT DIAM: 3.1 CM
ECHO AO ROOT INDEX: 2.09 CM/M2
ECHO AR MAX VEL PISA: 2.7 M/S
ECHO AV PEAK GRADIENT: 3 MMHG
ECHO AV PEAK VELOCITY: 0.8 M/S
ECHO AV REGURGITANT PHT: 9873.7 MILLISECOND
ECHO AV VELOCITY RATIO: 0.88
ECHO LA DIAMETER INDEX: 1.96 CM/M2
ECHO LA DIAMETER: 2.9 CM
ECHO LA TO AORTIC ROOT RATIO: 0.94
ECHO LV E' LATERAL VELOCITY: 2 CM/S
ECHO LV E' SEPTAL VELOCITY: 2 CM/S
ECHO LV EDV A2C: 106 ML
ECHO LV EDV A4C: 117 ML
ECHO LV EDV BP: 116 ML (ref 56–104)
ECHO LV EDV INDEX A4C: 79 ML/M2
ECHO LV EDV INDEX BP: 78 ML/M2
ECHO LV EDV NDEX A2C: 72 ML/M2
ECHO LV EJECTION FRACTION A2C: 24 %
ECHO LV EJECTION FRACTION A4C: 25 %
ECHO LV EJECTION FRACTION BIPLANE: 25 % (ref 55–100)
ECHO LV ESV A2C: 81 ML
ECHO LV ESV A4C: 88 ML
ECHO LV ESV BP: 88 ML (ref 19–49)
ECHO LV ESV INDEX A2C: 55 ML/M2
ECHO LV ESV INDEX A4C: 59 ML/M2
ECHO LV ESV INDEX BP: 59 ML/M2
ECHO LV FRACTIONAL SHORTENING: 8 % (ref 28–44)
ECHO LV INTERNAL DIMENSION DIASTOLE INDEX: 4.12 CM/M2
ECHO LV INTERNAL DIMENSION DIASTOLIC: 6.1 CM (ref 3.9–5.3)
ECHO LV INTERNAL DIMENSION SYSTOLIC INDEX: 3.78 CM/M2
ECHO LV INTERNAL DIMENSION SYSTOLIC: 5.6 CM
ECHO LV IVSD: 0.9 CM (ref 0.6–0.9)
ECHO LV MASS 2D: 222 G (ref 67–162)
ECHO LV MASS INDEX 2D: 150 G/M2 (ref 43–95)
ECHO LV POSTERIOR WALL DIASTOLIC: 0.9 CM (ref 0.6–0.9)
ECHO LV RELATIVE WALL THICKNESS RATIO: 0.3
ECHO LVOT PEAK GRADIENT: 2 MMHG
ECHO LVOT PEAK VELOCITY: 0.7 M/S
ECHO MV A VELOCITY: 0.69 M/S
ECHO MV AREA PHT: 3 CM2
ECHO MV E DECELERATION TIME (DT): 251.8 MS
ECHO MV E VELOCITY: 0.22 M/S
ECHO MV E/A RATIO: 0.32
ECHO MV E/E' LATERAL: 11
ECHO MV E/E' RATIO (AVERAGED): 11
ECHO MV E/E' SEPTAL: 11
ECHO MV PRESSURE HALF TIME (PHT): 73 MS
ECHO PULMONARY ARTERY END DIASTOLIC PRESSURE: 12 MMHG
ECHO PULMONARY ARTERY END DIASTOLIC PRESSURE: 22 MMHG
ECHO PV MAX VELOCITY: 0.8 M/S
ECHO PV PEAK GRADIENT: 3 MMHG
ECHO PV REGURGITANT MAX VELOCITY: 1.7 M/S
ECHO RV INTERNAL DIMENSION: 2.5 CM
ECHO RV TAPSE: 1.4 CM (ref 1.7–?)
ECHO TV REGURGITANT MAX VELOCITY: 2.64 M/S
ECHO TV REGURGITANT PEAK GRADIENT: 28 MMHG
EOSINOPHIL # BLD: 0 K/UL (ref 0–0.4)
EOSINOPHIL NFR BLD: 0 % (ref 0–7)
ERYTHROCYTE [DISTWIDTH] IN BLOOD BY AUTOMATED COUNT: 12.9 % (ref 11.5–14.5)
GLOBULIN SER CALC-MCNC: 2.4 G/DL (ref 2–4)
GLUCOSE BLD STRIP.AUTO-MCNC: 126 MG/DL (ref 65–117)
GLUCOSE BLD STRIP.AUTO-MCNC: 143 MG/DL (ref 65–117)
GLUCOSE SERPL-MCNC: 161 MG/DL (ref 65–100)
HCT VFR BLD AUTO: 37.6 % (ref 35–47)
HGB BLD-MCNC: 11.7 G/DL (ref 11.5–16)
IMM GRANULOCYTES # BLD AUTO: 0 K/UL (ref 0–0.04)
IMM GRANULOCYTES NFR BLD AUTO: 0 % (ref 0–0.5)
INR PPP: 1.3 (ref 0.9–1.1)
LACTATE SERPL-SCNC: 1.8 MMOL/L (ref 0.4–2)
LYMPHOCYTES # BLD: 1.1 K/UL (ref 0.8–3.5)
LYMPHOCYTES NFR BLD: 8 % (ref 12–49)
MAGNESIUM SERPL-MCNC: 2.2 MG/DL (ref 1.6–2.4)
MCH RBC QN AUTO: 24.1 PG (ref 26–34)
MCHC RBC AUTO-ENTMCNC: 31.1 G/DL (ref 30–36.5)
MCV RBC AUTO: 77.4 FL (ref 80–99)
MONOCYTES # BLD: 0.9 K/UL (ref 0–1)
MONOCYTES NFR BLD: 6 % (ref 5–13)
NEUTS SEG # BLD: 12.2 K/UL (ref 1.8–8)
NEUTS SEG NFR BLD: 86 % (ref 32–75)
NRBC # BLD: 0 K/UL (ref 0–0.01)
NRBC BLD-RTO: 0 PER 100 WBC
P-R INTERVAL, ECG05: 158 MS
P-R INTERVAL, ECG05: 164 MS
P-R INTERVAL, ECG05: 174 MS
P-R INTERVAL, ECG05: 88 MS
PHOSPHATE SERPL-MCNC: 2.7 MG/DL (ref 2.6–4.7)
PLATELET # BLD AUTO: 66 K/UL (ref 150–400)
PLATELET COMMENTS,PCOM: ABNORMAL
PMV BLD AUTO: ABNORMAL FL (ref 8.9–12.9)
POTASSIUM SERPL-SCNC: 3.5 MMOL/L (ref 3.5–5.1)
PROT SERPL-MCNC: 5.7 G/DL (ref 6.4–8.2)
PROTHROMBIN TIME: 13.2 SEC (ref 9–11.1)
Q-T INTERVAL, ECG07: 478 MS
Q-T INTERVAL, ECG07: 510 MS
Q-T INTERVAL, ECG07: 582 MS
Q-T INTERVAL, ECG07: 754 MS
QRS DURATION, ECG06: 100 MS
QRS DURATION, ECG06: 100 MS
QRS DURATION, ECG06: 156 MS
QRS DURATION, ECG06: 88 MS
QTC CALCULATION (BEZET), ECG08: 485 MS
QTC CALCULATION (BEZET), ECG08: 538 MS
QTC CALCULATION (BEZET), ECG08: 582 MS
QTC CALCULATION (BEZET), ECG08: 667 MS
RBC # BLD AUTO: 4.86 M/UL (ref 3.8–5.2)
RBC MORPH BLD: ABNORMAL
SERVICE CMNT-IMP: ABNORMAL
SERVICE CMNT-IMP: ABNORMAL
SODIUM SERPL-SCNC: 134 MMOL/L (ref 136–145)
THERAPEUTIC RANGE,PTTT: ABNORMAL SECS (ref 58–77)
VENTRICULAR RATE, ECG03: 47 BPM
VENTRICULAR RATE, ECG03: 60 BPM
VENTRICULAR RATE, ECG03: 62 BPM
VENTRICULAR RATE, ECG03: 67 BPM
WBC # BLD AUTO: 14.2 K/UL (ref 3.6–11)

## 2022-10-03 PROCEDURE — 77030038269 HC DRN EXT URIN PURWCK BARD -A

## 2022-10-03 PROCEDURE — 83605 ASSAY OF LACTIC ACID: CPT

## 2022-10-03 PROCEDURE — 83735 ASSAY OF MAGNESIUM: CPT

## 2022-10-03 PROCEDURE — 82962 GLUCOSE BLOOD TEST: CPT

## 2022-10-03 PROCEDURE — 85025 COMPLETE CBC W/AUTO DIFF WBC: CPT

## 2022-10-03 PROCEDURE — 85730 THROMBOPLASTIN TIME PARTIAL: CPT

## 2022-10-03 PROCEDURE — 74011250636 HC RX REV CODE- 250/636: Performed by: STUDENT IN AN ORGANIZED HEALTH CARE EDUCATION/TRAINING PROGRAM

## 2022-10-03 PROCEDURE — 83880 ASSAY OF NATRIURETIC PEPTIDE: CPT

## 2022-10-03 PROCEDURE — 36415 COLL VENOUS BLD VENIPUNCTURE: CPT

## 2022-10-03 PROCEDURE — 94003 VENT MGMT INPAT SUBQ DAY: CPT

## 2022-10-03 PROCEDURE — 74011000258 HC RX REV CODE- 258: Performed by: STUDENT IN AN ORGANIZED HEALTH CARE EDUCATION/TRAINING PROGRAM

## 2022-10-03 PROCEDURE — 74011000250 HC RX REV CODE- 250: Performed by: STUDENT IN AN ORGANIZED HEALTH CARE EDUCATION/TRAINING PROGRAM

## 2022-10-03 PROCEDURE — 86316 IMMUNOASSAY TUMOR OTHER: CPT

## 2022-10-03 PROCEDURE — 74011000258 HC RX REV CODE- 258: Performed by: EMERGENCY MEDICINE

## 2022-10-03 PROCEDURE — 74011250637 HC RX REV CODE- 250/637: Performed by: STUDENT IN AN ORGANIZED HEALTH CARE EDUCATION/TRAINING PROGRAM

## 2022-10-03 PROCEDURE — 74011250637 HC RX REV CODE- 250/637: Performed by: INTERNAL MEDICINE

## 2022-10-03 PROCEDURE — 74011250636 HC RX REV CODE- 250/636: Performed by: EMERGENCY MEDICINE

## 2022-10-03 PROCEDURE — 80053 COMPREHEN METABOLIC PANEL: CPT

## 2022-10-03 PROCEDURE — 65620000000 HC RM CCU GENERAL

## 2022-10-03 PROCEDURE — 74011000250 HC RX REV CODE- 250: Performed by: EMERGENCY MEDICINE

## 2022-10-03 PROCEDURE — 74011250637 HC RX REV CODE- 250/637: Performed by: EMERGENCY MEDICINE

## 2022-10-03 PROCEDURE — 93005 ELECTROCARDIOGRAM TRACING: CPT

## 2022-10-03 PROCEDURE — 84100 ASSAY OF PHOSPHORUS: CPT

## 2022-10-03 PROCEDURE — 99291 CRITICAL CARE FIRST HOUR: CPT | Performed by: INTERNAL MEDICINE

## 2022-10-03 PROCEDURE — 85610 PROTHROMBIN TIME: CPT

## 2022-10-03 RX ORDER — DOBUTAMINE HYDROCHLORIDE 200 MG/100ML
1 INJECTION INTRAVENOUS CONTINUOUS
Status: DISCONTINUED | OUTPATIENT
Start: 2022-10-03 | End: 2022-10-07

## 2022-10-03 RX ORDER — HYDROMORPHONE HYDROCHLORIDE 1 MG/ML
0.5 INJECTION, SOLUTION INTRAMUSCULAR; INTRAVENOUS; SUBCUTANEOUS
Status: DISCONTINUED | OUTPATIENT
Start: 2022-10-03 | End: 2022-10-08

## 2022-10-03 RX ORDER — POTASSIUM CHLORIDE 750 MG/1
20 TABLET, FILM COATED, EXTENDED RELEASE ORAL DAILY
Status: DISCONTINUED | OUTPATIENT
Start: 2022-10-03 | End: 2022-10-06

## 2022-10-03 RX ORDER — POTASSIUM CHLORIDE 29.8 MG/ML
20 INJECTION INTRAVENOUS
Status: COMPLETED | OUTPATIENT
Start: 2022-10-03 | End: 2022-10-03

## 2022-10-03 RX ORDER — MILRINONE LACTATE 0.2 MG/ML
0.2 INJECTION, SOLUTION INTRAVENOUS CONTINUOUS
Status: DISCONTINUED | OUTPATIENT
Start: 2022-10-03 | End: 2022-10-03

## 2022-10-03 RX ORDER — MIDODRINE HYDROCHLORIDE 5 MG/1
20 TABLET ORAL EVERY 8 HOURS
Status: DISCONTINUED | OUTPATIENT
Start: 2022-10-03 | End: 2022-10-04

## 2022-10-03 RX ORDER — MIDODRINE HYDROCHLORIDE 5 MG/1
20 TABLET ORAL EVERY 6 HOURS
Status: DISCONTINUED | OUTPATIENT
Start: 2022-10-03 | End: 2022-10-03

## 2022-10-03 RX ORDER — DEXMEDETOMIDINE HYDROCHLORIDE 4 UG/ML
.1-1.5 INJECTION, SOLUTION INTRAVENOUS
Status: DISCONTINUED | OUTPATIENT
Start: 2022-10-03 | End: 2022-10-04

## 2022-10-03 RX ADMIN — SENNOSIDES AND DOCUSATE SODIUM 1 TABLET: 50; 8.6 TABLET ORAL at 21:00

## 2022-10-03 RX ADMIN — HEPARIN SODIUM 9 UNITS/KG/HR: 10000 INJECTION, SOLUTION INTRAVENOUS at 02:21

## 2022-10-03 RX ADMIN — DEXTROSE MONOHYDRATE 17 MCG/MIN: 50 INJECTION, SOLUTION INTRAVENOUS at 09:18

## 2022-10-03 RX ADMIN — DEXTROSE MONOHYDRATE 2 MCG/MIN: 50 INJECTION, SOLUTION INTRAVENOUS at 12:46

## 2022-10-03 RX ADMIN — MIDODRINE HYDROCHLORIDE 20 MG: 5 TABLET ORAL at 05:46

## 2022-10-03 RX ADMIN — SODIUM CHLORIDE, PRESERVATIVE FREE 10 ML: 5 INJECTION INTRAVENOUS at 14:00

## 2022-10-03 RX ADMIN — VASOPRESSIN 0.04 UNITS/MIN: 20 INJECTION PARENTERAL at 19:12

## 2022-10-03 RX ADMIN — ACETAMINOPHEN 1000 MG: 500 TABLET, FILM COATED ORAL at 05:46

## 2022-10-03 RX ADMIN — CHLORHEXIDINE GLUCONATE 15 ML: 1.2 RINSE ORAL at 09:47

## 2022-10-03 RX ADMIN — MIDODRINE HYDROCHLORIDE 20 MG: 5 TABLET ORAL at 22:00

## 2022-10-03 RX ADMIN — DEXTROSE MONOHYDRATE 4 MCG/MIN: 50 INJECTION, SOLUTION INTRAVENOUS at 12:42

## 2022-10-03 RX ADMIN — DEXTROSE MONOHYDRATE 17 MCG/MIN: 50 INJECTION, SOLUTION INTRAVENOUS at 12:03

## 2022-10-03 RX ADMIN — DEXTROSE MONOHYDRATE 12 MCG/MIN: 50 INJECTION, SOLUTION INTRAVENOUS at 12:15

## 2022-10-03 RX ADMIN — POTASSIUM CHLORIDE 20 MEQ: 29.8 INJECTION, SOLUTION INTRAVENOUS at 08:55

## 2022-10-03 RX ADMIN — POLYETHYLENE GLYCOL 3350 17 G: 17 POWDER, FOR SOLUTION ORAL at 09:39

## 2022-10-03 RX ADMIN — DOBUTAMINE HYDROCHLORIDE 2.5 MCG/KG/MIN: 200 INJECTION INTRAVENOUS at 07:56

## 2022-10-03 RX ADMIN — FAMOTIDINE 20 MG: 20 TABLET, FILM COATED ORAL at 09:38

## 2022-10-03 RX ADMIN — DEXTROSE MONOHYDRATE 20 MCG/MIN: 50 INJECTION, SOLUTION INTRAVENOUS at 09:23

## 2022-10-03 RX ADMIN — ACETAMINOPHEN 1000 MG: 500 TABLET, FILM COATED ORAL at 00:12

## 2022-10-03 RX ADMIN — VASOPRESSIN 0.04 UNITS/MIN: 20 INJECTION PARENTERAL at 11:51

## 2022-10-03 RX ADMIN — SENNOSIDES AND DOCUSATE SODIUM 1 TABLET: 50; 8.6 TABLET ORAL at 09:38

## 2022-10-03 RX ADMIN — DEXTROSE MONOHYDRATE 15 MCG/MIN: 50 INJECTION, SOLUTION INTRAVENOUS at 12:07

## 2022-10-03 RX ADMIN — DEXTROSE MONOHYDRATE 9 MCG/MIN: 50 INJECTION, SOLUTION INTRAVENOUS at 12:29

## 2022-10-03 RX ADMIN — DEXMEDETOMIDINE HYDROCHLORIDE 0.2 MCG/KG/HR: 4 INJECTION, SOLUTION INTRAVENOUS at 12:33

## 2022-10-03 RX ADMIN — CEFTRIAXONE SODIUM 1 G: 1 INJECTION, POWDER, FOR SOLUTION INTRAMUSCULAR; INTRAVENOUS at 09:05

## 2022-10-03 RX ADMIN — PROPOFOL 20 MCG/KG/MIN: 10 INJECTION, EMULSION INTRAVENOUS at 09:20

## 2022-10-03 RX ADMIN — PROPOFOL 10 MCG/KG/MIN: 10 INJECTION, EMULSION INTRAVENOUS at 09:36

## 2022-10-03 RX ADMIN — POTASSIUM CHLORIDE 20 MEQ: 750 TABLET, FILM COATED, EXTENDED RELEASE ORAL at 16:38

## 2022-10-03 RX ADMIN — DEXTROSE MONOHYDRATE 15 MCG/MIN: 50 INJECTION, SOLUTION INTRAVENOUS at 07:53

## 2022-10-03 RX ADMIN — SODIUM CHLORIDE, PRESERVATIVE FREE 10 ML: 5 INJECTION INTRAVENOUS at 22:00

## 2022-10-03 RX ADMIN — POTASSIUM CHLORIDE 20 MEQ: 29.8 INJECTION, SOLUTION INTRAVENOUS at 06:43

## 2022-10-03 RX ADMIN — DEXTROSE MONOHYDRATE 15 MCG/MIN: 50 INJECTION, SOLUTION INTRAVENOUS at 08:45

## 2022-10-03 RX ADMIN — ACETAMINOPHEN 650 MG: 325 TABLET, FILM COATED ORAL at 16:39

## 2022-10-03 RX ADMIN — DEXTROSE MONOHYDRATE 7 MCG/MIN: 50 INJECTION, SOLUTION INTRAVENOUS at 12:36

## 2022-10-03 NOTE — CONSULTS
Comprehensive Nutrition Assessment    Type and Reason for Visit: Initial, Positive nutrition screen, Consult    Nutrition Recommendations/Plan:      RD changed formula to Osmolite 1.2, advancing slowly to goal of 50 ml/hr    2. Give 75 ml water flushes q 4 hrs    3. The above goal will provide: 1100 ml formula, 1320 kcals (1480 kcals with diprivan), 56 gms pro, 1350 ml of free water    4. Decrease IV as tube feeds/water flushes advance       Malnutrition Assessment:  Malnutrition Status: At risk for malnutrition (specify) (d/t Alzheimer's) (10/03/22 6713)           Nutrition Assessment:    Per history: \"77F with Alzheimer disease, HTN, presenting after witnessed cardiac arrest at home.  immediately started CPR, EMS then found Vifb with Shock x1, followed by asystole. ROSC < 5minutes reported. EMS transported to [facility] [place] with pulse for 30 seconds started on amio gtt. CTH negative. Moved to [facility] CCU for further management. \"    Visited pt, met pt's  and daughter who were also in the room. I explained the process of the tube feeding, daughter asked about calories pt was receiving; daughter and  voiced understanding of what we discussed. Pt's  reports that pt has lost some weight in the past year since she started developing Alzheimer's, usually weighed about 110-115 pounds. Pt is currently sedated on the vent, was not a candidate for code ice. Current rate of diprivan is fairly low, just supplying ~ 160 fat kcals. Pt with h/o beginning stages of Alzheimer's, HTN, unsure of neuro status at this point. Pt has 2cal HN ordered at 20 ml/hr; will adjust tube feeds to Osmolite 1.2 (since pt is on fairly high rate of Levophed and do not want to use fiber-containing formula). Unit RD will continue to follow, please see above for tube feeding adjustments.       Nutritionally Significant Medications:  NaCl IV at 75 ml, pepcid, milk of magnesia, miralax, pericolace, marco      Estimated Daily Nutrient Needs:  Energy Requirements Based On: Kcal/kg  Weight Used for Energy Requirements: Current  Energy (kcal/day): ~ 9511-0280 kcals (25-30 kcals/kg d/t low weight)  Weight Used for Protein Requirements: Current  Protein (g/day): 1.2 gm pro/kg or ~ 60 gms  Method Used for Fluid Requirements: 1 ml/kcal  Fluid (ml/day): 1 ml/kcal    Nutrition Related Findings:   Edema:   No data recorded   Last BM: 10/01/22,      Wounds: None      Current Nutrition Therapies:  Diet: NPO, tube feeds  Supplements: none  Meal intake: No data found. Supplement intake: No data found. Nutrition Support: yes, trickle feeds      Anthropometric Measures:  Height: 5' 2.99\" (160 cm)  Ideal Body Weight (IBW): 115 lbs (52 kg)  Admission Body Weight: 107 lb 12.9 oz  Current Body Wt:  48.9 kg (107 lb 12.9 oz), 93.7 % IBW. Current BMI (kg/m2): 19.1                          BMI Category: Underweight (BMI less than 22) age over 72    Wt Readings from Last 10 Encounters:   10/03/22 48.9 kg (107 lb 12.9 oz)   10/01/22 50.3 kg (111 lb)   08/29/18 49.9 kg (110 lb)           Nutrition Diagnosis:    Inadequate oral intake related to impaired respiratory function as evidenced by intubation, nutrition support-enteral nutrition    Nutrition Interventions:   Food and/or Nutrient Delivery: Modify tube feeding  Nutrition Education/Counseling: No recommendations at this time  Coordination of Nutrition Care: Continue to monitor while inpatient, Interdisciplinary rounds       Goals: Tolerance of goal tube feeds over the next 2-4 days             Nutrition Monitoring and Evaluation:   Behavioral-Environmental Outcomes: None identified  Food/Nutrient Intake Outcomes: Diet advancement/tolerance, Enteral nutrition intake/tolerance  Physical Signs/Symptoms Outcomes: Biochemical data, GI status, Weight, Fluid status or edema    Discharge Planning:     Too soon to determine    Earnestine Scarce, RD, CSP  Available via [email or web address]

## 2022-10-03 NOTE — PROGRESS NOTES
Transition of care    Reason for Admission:  Cardiac Arrest                   RUR Score:    15%                 Plan for utilizing home health:    Mr. Catalino Back stated that his wife would be willing to have home healthe services, if needed. PCP: First and Last name:  Mona Chung MD     Name of Practice: Family Practice    Are you a current patient: Yes/No: Yes    Approximate date of last visit: 9/28/2022    Can you participate in a virtual visit with your PCP: Yes                    Current Advanced Directive/Advance Care Plan: Full Code  Mr. Catalino Back reported that Mrs. Catalino Back does not have any advance care plan. He would like to complete one when Mrs. Catalino Back recovers enough. Healthcare Decision Maker:   Click here to complete [address] including selection of the Healthcare Decision Maker Relationship (ie \"Primary\")  Mrs. Tao's healthcare decision maker is her ,  Nilton Love. [phone]                  Transition of Care Plan:  Mr. Catalino Back and Mrs. Tao's daughter were interview for the assessment. Mrs. Catalino Back is vented. Mr. Catalino Back verified their address and phone number. They live in a 2 story home with 3 steps to enter. They have a stair lift in the home. Mrs. Catalino Back is retired. She does not drive due to some dementia. She was independent with her ADLs and IADLs prior to her admission. Their pharmacy is the [facility]. They are able to afford and acquire their medications. Care Management Interventions  PCP Verified by CM:  Yes  Last Visit to PCP: 09/28/22  Palliative Care Criteria Met (RRAT>21 & CHF Dx)?: No  Mode of Transport at Discharge: Self  Transition of Care Consult (CM Consult): Discharge Planning  MyChart Signup: No  Discharge Durable Medical Equipment: No  Physical Therapy Consult: Yes  Occupational Therapy Consult: Yes  Speech Therapy Consult: Yes  Support Systems: Spouse/Significant Other, Child(davonte)  Confirm Follow Up Transport: Family  The Patient and/or Patient Representative was Provided with a Choice of Provider and Agrees with the Discharge Plan?: No  Freedom of Choice List was Provided with Basic Dialogue that Supports the Patient's Individualized Plan of Care/Goals, Treatment Preferences and Shares the Quality Data Associated with the Providers?: No   Resource Information Provided?: No  Discharge Location  Patient Expects to be Discharged to[de-identified] Unable to determine at this time    Will continue to follow for discharge planning.   Signed By: Jesus Hernandez LCSW     October 3, 2022

## 2022-10-03 NOTE — PROGRESS NOTES
Cardiology Progress Note                                        Admit Date: 10/2/2022    Assessment/Plan:     Cardiac arrest; she has underlying severe cardiomyopathy; probably non-ischemic but CAD needs to be ruled out  Hypoxic encephalopathy; she was able to be extubated; she is conversing and thus suspect little damage. Cardiogenic shock; on pressor and added Dobutamine  CHF; acute systolic; I consulted [facility] team for assistance    Viet Lizarraga is a 65 y.o. female with     PROBLEM LIST:  Patient Active Problem List    Diagnosis Date Noted    Cardiac arrest ([address] 10/01/2022         Subjective:     Viet Lizarraga reports none.     Visit Vitals  BP (!) 110/57   Pulse 62   Temp 98.1 °F (36.7 °C)   Resp 13   Ht 5' 2.99\" (1.6 m)   Wt 107 lb 12.9 oz (48.9 kg)   SpO2 100%   BMI 19.10 kg/m²       Intake/Output Summary (Last 24 hours) at 10/3/2022 1355  Last data filed at 10/3/2022 1300  Gross per 24 hour   Intake 1907.45 ml   Output 677 ml   Net 1230.45 ml       Objective:      Physical Exam:  HEENT: Perrla, EOMI  Neck: No JVD,  No thyroidmegaly  Resp: some rales  CV: RRR s1s2 No murmur , +s3  Abd:Soft, Nontender  Ext: No edema  Neuro: lethargic  Skin: Warm, Dry, Intact  Pulses: 2+ DP/PT/Rad      Telemetry: normal sinus rhythm    Current Facility-Administered Medications   Medication Dose Route Frequency    midodrine (PROAMATINE) tablet 20 mg  20 mg Oral Q8H    cefTRIAXone (ROCEPHIN) 1 g in 0.9% sodium chloride 10 mL IV syringe  1 g IntraVENous Q24H    DOBUTamine (DOBUTREX) 500 mg/250 mL (2,000 mcg/mL) infusion  5 mcg/kg/min IntraVENous CONTINUOUS    alteplase (CATHFLO) 1 mg in sterile water (preservative free) 1 mL injection  1 mg InterCATHeter PRN    vasopressin (VASOSTRICT) 20 Units in 0.9% sodium chloride 100 mL infusion  0.04 Units/min IntraVENous CONTINUOUS    dexmedeTOMidine in 0.9 % NaCl (PRECEDEX) 400 mcg/100 mL (4 mcg/mL) infusion soln  0.1-1.5 mcg/kg/hr IntraVENous TITRATE    NOREPINephrine (LEVOPHED) 8 mg in 5% dextrose 250mL (32 mcg/mL) infusion  0.5-30 mcg/min IntraVENous TITRATE    amiodarone (CORDARONE) 375 mg/250 mL D5W infusion  0.5-1 mg/min IntraVENous TITRATE    propofol (DIPRIVAN) 10 mg/mL infusion  0-50 mcg/kg/min IntraVENous TITRATE    fentaNYL (PF) 1,500 mcg/30 mL (50 mcg/mL) infusion  0-200 mcg/hr IntraVENous TITRATE    0.9% sodium chloride infusion  75 mL/hr IntraVENous CONTINUOUS    sodium chloride (NS) flush 5-40 mL  5-40 mL IntraVENous Q8H    sodium chloride (NS) flush 5-40 mL  5-40 mL IntraVENous PRN    acetaminophen (TYLENOL) tablet 650 mg  650 mg Oral Q6H PRN    Or    acetaminophen (TYLENOL) suppository 650 mg  650 mg Rectal Q6H PRN    polyethylene glycol (MIRALAX) packet 17 g  17 g Oral DAILY    senna-docusate (PERICOLACE) 8.6-50 mg per tablet 1 Tablet  1 Tablet Oral BID    magnesium hydroxide (MILK OF MAGNESIA) 400 mg/5 mL oral suspension 30 mL  30 mL Oral DAILY PRN    sodium phosphate (FLEET'S) enema 1 Enema  1 Enema Rectal DAILY PRN    ondansetron (ZOFRAN) injection 4 mg  4 mg IntraVENous Q6H PRN    heparin 25,000 units in D5W 250 ml infusion  12-25 Units/kg/hr IntraVENous TITRATE    chlorhexidine (PERIDEX) 0.12 % mouthwash 15 mL  15 mL Oral Q12H    famotidine (PEPCID) tablet 20 mg  20 mg Oral DAILY         Data Review:   Labs:    Recent Results (from the past 24 hour(s))   TROPONIN-HIGH SENSITIVITY    Collection Time: 10/02/22  2:15 PM   Result Value Ref Range    Troponin-High Sensitivity 641 (HH) 0 - 51 ng/L   LACTIC ACID    Collection Time: 10/02/22  2:15 PM   Result Value Ref Range    Lactic acid 2.1 (HH) 0.4 - 2.0 MMOL/L   METABOLIC PANEL, BASIC    Collection Time: 10/02/22  2:15 PM   Result Value Ref Range    Sodium 134 (L) 136 - 145 mmol/L    Potassium 4.4 3.5 - 5.1 mmol/L    Chloride 103 97 - 108 mmol/L    CO2 24 21 - 32 mmol/L    Anion gap 7 5 - 15 mmol/L    Glucose 229 (H) 65 - 100 mg/dL    BUN 41 (H) 6 - 20 MG/DL    Creatinine 1.68 (H) 0.55 - 1.02 MG/DL    BUN/Creatinine ratio 24 (H) 12 - 20      GFR est AA 36 (L) >60 ml/min/1.73m2    GFR est non-AA 29 (L) >60 ml/min/1.73m2    Calcium 8.4 (L) 8.5 - 10.1 MG/DL   MAGNESIUM    Collection Time: 10/02/22  2:15 PM   Result Value Ref Range    Magnesium 2.8 (H) 1.6 - 2.4 mg/dL   PTT    Collection Time: 10/02/22  3:23 PM   Result Value Ref Range    aPTT 82.2 (H) 22.1 - 31.0 sec    aPTT, therapeutic range     58.0 - 77.0 SECS   GLUCOSE, POC    Collection Time: 10/02/22  6:36 PM   Result Value Ref Range    Glucose (POC) 191 (H) 65 - 117 mg/dL    Performed by Della Barton    LACTIC ACID    Collection Time: 10/02/22  6:39 PM   Result Value Ref Range    Lactic acid 2.3 (HH) 0.4 - 2.0 MMOL/L   GLUCOSE, POC    Collection Time: 10/02/22 11:56 PM   Result Value Ref Range    Glucose (POC) 192 (H) 65 - 117 mg/dL    Performed by Lucina Flores [facility]    PTT    Collection Time: 10/03/22  1:15 AM   Result Value Ref Range    aPTT 50.5 (H) 22.1 - 31.0 sec    aPTT, therapeutic range     58.0 - 77.0 SECS   GLUCOSE, POC    Collection Time: 10/03/22  4:20 AM   Result Value Ref Range    Glucose (POC) 143 (H) 65 - 117 mg/dL    Performed by [facility]    EKG, 12 LEAD, INITIAL    Collection Time: 10/03/22  4:40 AM   Result Value Ref Range    Ventricular Rate 60 BPM    Atrial Rate 60 BPM    P-R Interval 158 ms    QRS Duration 88 ms    Q-T Interval 582 ms    QTC Calculation (Bezet) 582 ms    Calculated R Axis -35 degrees    Calculated T Axis 124 degrees    Diagnosis       Normal sinus rhythm  Left axis deviation  Septal infarct , age undetermined  T wave abnormality, consider lateral ischemia  Prolonged QT  When compared with ECG of 02-OCT-2022 06:39,  Significant changes have occurred     METABOLIC PANEL, COMPREHENSIVE    Collection Time: 10/03/22  4:47 AM   Result Value Ref Range    Sodium 134 (L) 136 - 145 mmol/L    Potassium 3.5 3.5 - 5.1 mmol/L    Chloride 104 97 - 108 mmol/L    CO2 22 21 - 32 mmol/L    Anion gap 8 5 - 15 mmol/L    Glucose 161 (H) 65 - 100 mg/dL BUN 34 (H) 6 - 20 MG/DL    Creatinine 1.41 (H) 0.55 - 1.02 MG/DL    BUN/Creatinine ratio 24 (H) 12 - 20      GFR est AA 44 (L) >60 ml/min/1.73m2    GFR est non-AA 36 (L) >60 ml/min/1.73m2    Calcium 8.6 8.5 - 10.1 MG/DL    Bilirubin, total 1.9 (H) 0.2 - 1.0 MG/DL    ALT (SGPT) 97 (H) 12 - 78 U/L    AST (SGOT) 55 (H) 15 - 37 U/L    Alk. phosphatase 81 45 - 117 U/L    Protein, total 5.7 (L) 6.4 - 8.2 g/dL    Albumin 3.3 (L) 3.5 - 5.0 g/dL    Globulin 2.4 2.0 - 4.0 g/dL    A-G Ratio 1.4 1.1 - 2.2     LACTIC ACID    Collection Time: 10/03/22  4:47 AM   Result Value Ref Range    Lactic acid 1.8 0.4 - 2.0 MMOL/L   MAGNESIUM    Collection Time: 10/03/22  4:47 AM   Result Value Ref Range    Magnesium 2.2 1.6 - 2.4 mg/dL   PHOSPHORUS    Collection Time: 10/03/22  4:47 AM   Result Value Ref Range    Phosphorus 2.7 2.6 - 4.7 MG/DL   CBC WITH AUTOMATED DIFF    Collection Time: 10/03/22  4:47 AM   Result Value Ref Range    WBC 14.2 (H) 3.6 - 11.0 K/uL    RBC 4.86 3.80 - 5.20 M/uL    HGB 11.7 11.5 - 16.0 g/dL    HCT 37.6 35.0 - 47.0 %    MCV 77.4 (L) 80.0 - 99.0 FL    MCH 24.1 (L) 26.0 - 34.0 PG    MCHC 31.1 30.0 - 36.5 g/dL    RDW 12.9 11.5 - 14.5 %    PLATELET 66 (L) 256 - 400 K/uL    MPV ABNORMAL 8.9 - 12.9 FL    NRBC 0.0 0  WBC    ABSOLUTE NRBC 0.00 0.00 - 0.01 K/uL    NEUTROPHILS 86 (H) 32 - 75 %    LYMPHOCYTES 8 (L) 12 - 49 %    MONOCYTES 6 5 - 13 %    EOSINOPHILS 0 0 - 7 %    BASOPHILS 0 0 - 1 %    IMMATURE GRANULOCYTES 0 0.0 - 0.5 %    ABS. NEUTROPHILS 12.2 (H) 1.8 - 8.0 K/UL    ABS. LYMPHOCYTES 1.1 0.8 - 3.5 K/UL    ABS. MONOCYTES 0.9 0.0 - 1.0 K/UL    ABS. EOSINOPHILS 0.0 0.0 - 0.4 K/UL    ABS. BASOPHILS 0.0 0.0 - 0.1 K/UL    ABS. IMM.  GRANS. 0.0 0.00 - 0.04 K/UL    DF SMEAR SCANNED      PLATELET COMMENTS Large Platelets      RBC COMMENTS MICROCYTOSIS  1+        RBC COMMENTS HYPOCHROMIA  1+        RBC COMMENTS BUSTER CELLS  PRESENT        RBC COMMENTS OVALOCYTES  PRESENT       PROTHROMBIN TIME + INR    Collection Time: 10/03/22  4:47 AM   Result Value Ref Range    INR 1.3 (H) 0.9 - 1.1      Prothrombin time 13.2 (H) 9.0 - 11.1 sec   NT-PRO BNP    Collection Time: 10/03/22  7:45 AM   Result Value Ref Range    NT pro-BNP 18,212 (H) <450 PG/ML   PTT    Collection Time: 10/03/22  9:35 AM   Result Value Ref Range    aPTT 76.8 (H) 22.1 - 31.0 sec    aPTT, therapeutic range     58.0 - 77.0 SECS

## 2022-10-03 NOTE — PROGRESS NOTES
HISTORY OF PRESENT ILLNESS: 77F with Alzheimer disease, HTN, presenting after witnessed cardiac arrest at home.  immediately started CPR, EMS then found Vifb with Shock x1, followed by asystole. ROSC < 5minutes reported. EMS transported to [facility] , [place] with pulse for 30 seconds started on amio gtt. CTH negative. Moved to [facility] CCU for further management.        Interval history    10/3-extubated this afternoon, dobutamine added today, still requiring pressors      Current Facility-Administered Medications:     midodrine (PROAMATINE) tablet 20 mg, 20 mg, Oral, Q8H, Alfonso Broussard MD    cefTRIAXone (ROCEPHIN) 1 g in 0.9% sodium chloride 10 mL IV syringe, 1 g, IntraVENous, Q24H, Alfonso Broussard MD, 1 g at 10/03/22 0905    DOBUTamine (DOBUTREX) 500 mg/250 mL (2,000 mcg/mL) infusion, 5 mcg/kg/min, IntraVENous, CONTINUOUS, Alfonso Broussard MD, Last Rate: 7.3 mL/hr at 10/03/22 1313, 5 mcg/kg/min at 10/03/22 1313    alteplase (CATHFLO) 1 mg in sterile water (preservative free) 1 mL injection, 1 mg, InterCATHeter, PRN, Alfonso Chung MD    vasopressin (VASOSTRICT) 20 Units in 0.9% sodium chloride 100 mL infusion, 0.04 Units/min, IntraVENous, CONTINUOUS, Alfonso Broussard MD, Last Rate: 12 mL/hr at 10/03/22 1151, 0.04 Units/min at 10/03/22 1151    dexmedeTOMidine in 0.9 % NaCl (PRECEDEX) 400 mcg/100 mL (4 mcg/mL) infusion soln, 0.1-1.5 mcg/kg/hr, IntraVENous, TITRATE, Santiago HATCH MD, Stopped at 10/03/22 1245    potassium chloride SR (KLOR-CON 10) tablet 20 mEq, 20 mEq, Oral, DAILY, Nikole Casanova MD    NOREPINephrine (LEVOPHED) 8 mg in 5% dextrose 250mL (32 mcg/mL) infusion, 0.5-30 mcg/min, IntraVENous, TITRATE, Brissa Irvin, DO, Stopped at 10/03/22 1425    amiodarone (CORDARONE) 375 mg/250 mL D5W infusion, 0.5-1 mg/min, IntraVENous, TITRATE, Brissa Irvin, DO, Stopped at 10/02/22 0647    propofol (DIPRIVAN) 10 mg/mL infusion, 0-50 mcg/kg/min, IntraVENous, TITRATE, Sharmaine Diones, DO, Last Rate: 3 mL/hr at 10/03/22 0936, 10 mcg/kg/min at 10/03/22 0936    fentaNYL (PF) 1,500 mcg/30 mL (50 mcg/mL) infusion, 0-200 mcg/hr, IntraVENous, TITRATE, Sharmaine Diones, DO, Stopped at 10/03/22 0428    0.9% sodium chloride infusion, 75 mL/hr, IntraVENous, CONTINUOUS, Sharmaine Diones, DO, Stopped at 10/02/22 1020    sodium chloride (NS) flush 5-40 mL, 5-40 mL, IntraVENous, Q8H, Liu, Jarod G, DO, 10 mL at 10/02/22 1508    sodium chloride (NS) flush 5-40 mL, 5-40 mL, IntraVENous, PRN, Sharmaine Diones, DO    acetaminophen (TYLENOL) tablet 650 mg, 650 mg, Oral, Q6H PRN **OR** acetaminophen (TYLENOL) suppository 650 mg, 650 mg, Rectal, Q6H PRN, Sharmaine Diones, DO    polyethylene glycol (MIRALAX) packet 17 g, 17 g, Oral, DAILY, [place] Diones, DO, 17 g at 10/03/22 7290    senna-docusate (PERICOLACE) 8.6-50 mg per tablet 1 Tablet, 1 Tablet, Oral, BID, [place] Diones, DO, 1 Tablet at 10/03/22 4655    magnesium hydroxide (MILK OF MAGNESIA) 400 mg/5 mL oral suspension 30 mL, 30 mL, Oral, DAILY PRN, Sharmaine Diones, DO    sodium phosphate (FLEET'S) enema 1 Enema, 1 Enema, Rectal, DAILY PRN, Sharmaine Diones, DO    ondansetron [facility]) injection 4 mg, 4 mg, IntraVENous, Q6H PRN, Monique Bring G, DO    heparin 25,000 units in D5W 250 ml infusion, 12-25 Units/kg/hr, IntraVENous, TITRATE, Sharmaine Diones, DO, Last Rate: 4.5 mL/hr at 10/03/22 0753, 9 Units/kg/hr at 10/03/22 0753    chlorhexidine (PERIDEX) 0.12 % mouthwash 15 mL, 15 mL, Oral, Q12H, Jarod Liu DO, 15 mL at 10/03/22 0947    famotidine (PEPCID) tablet 20 mg, 20 mg, Oral, DAILY, Rachel RESTREPO MD, 20 mg at 10/03/22 0938      VITAL SIGNS:  Visit Vitals  BP (!) 132/59 (BP 1 Location: Left upper arm, BP Patient Position: At rest;Lying)   Pulse 74   Temp 98.8 °F (37.1 °C)   Resp 20   Ht 5' 2.99\" (1.6 m)   Wt 48.9 kg (107 lb 12.9 oz)   SpO2 99%   BMI 19.10 kg/m² PHYSICAL EXAMINATION:  General-NAD  Neuro-sleepy, nonfocal  Cardiac-regular  Lungs-clear anteriorly  Abdomen-soft, nontender, nondistended  Extremities-warm    LABORATORY ANALYSIS:  Recent Results (from the past 24 hour(s))   PTT    Collection Time: 10/02/22  3:23 PM   Result Value Ref Range    aPTT 82.2 (H) 22.1 - 31.0 sec    aPTT, therapeutic range     58.0 - 77.0 SECS   GLUCOSE, POC    Collection Time: 10/02/22  6:36 PM   Result Value Ref Range    Glucose (POC) 191 (H) 65 - 117 mg/dL    Performed by Della Barton    LACTIC ACID    Collection Time: 10/02/22  6:39 PM   Result Value Ref Range    Lactic acid 2.3 (HH) 0.4 - 2.0 MMOL/L   GLUCOSE, POC    Collection Time: 10/02/22 11:56 PM   Result Value Ref Range    Glucose (POC) 192 (H) 65 - 117 mg/dL    Performed by [facility]    PTT    Collection Time: 10/03/22  1:15 AM   Result Value Ref Range    aPTT 50.5 (H) 22.1 - 31.0 sec    aPTT, therapeutic range     58.0 - 77.0 SECS   GLUCOSE, POC    Collection Time: 10/03/22  4:20 AM   Result Value Ref Range    Glucose (POC) 143 (H) 65 - 117 mg/dL    Performed by [facility]    EKG, 12 LEAD, INITIAL    Collection Time: 10/03/22  4:40 AM   Result Value Ref Range    Ventricular Rate 60 BPM    Atrial Rate 60 BPM    P-R Interval 158 ms    QRS Duration 88 ms    Q-T Interval 582 ms    QTC Calculation (Bezet) 582 ms    Calculated R Axis -35 degrees    Calculated T Axis 124 degrees    Diagnosis       Normal sinus rhythm  Left axis deviation  Septal infarct , age undetermined  T wave abnormality, consider lateral ischemia  Prolonged QT  When compared with ECG of 02-OCT-2022 06:39,  Significant changes have occurred     METABOLIC PANEL, COMPREHENSIVE    Collection Time: 10/03/22  4:47 AM   Result Value Ref Range    Sodium 134 (L) 136 - 145 mmol/L    Potassium 3.5 3.5 - 5.1 mmol/L    Chloride 104 97 - 108 mmol/L    CO2 22 21 - 32 mmol/L    Anion gap 8 5 - 15 mmol/L    Glucose 161 (H) 65 - 100 mg/dL    BUN 34 (H) 6 - 20 MG/DL    Creatinine 1.41 (H) 0.55 - 1.02 MG/DL    BUN/Creatinine ratio 24 (H) 12 - 20      GFR est AA 44 (L) >60 ml/min/1.73m2    GFR est non-AA 36 (L) >60 ml/min/1.73m2    Calcium 8.6 8.5 - 10.1 MG/DL    Bilirubin, total 1.9 (H) 0.2 - 1.0 MG/DL    ALT (SGPT) 97 (H) 12 - 78 U/L    AST (SGOT) 55 (H) 15 - 37 U/L    Alk. phosphatase 81 45 - 117 U/L    Protein, total 5.7 (L) 6.4 - 8.2 g/dL    Albumin 3.3 (L) 3.5 - 5.0 g/dL    Globulin 2.4 2.0 - 4.0 g/dL    A-G Ratio 1.4 1.1 - 2.2     LACTIC ACID    Collection Time: 10/03/22  4:47 AM   Result Value Ref Range    Lactic acid 1.8 0.4 - 2.0 MMOL/L   MAGNESIUM    Collection Time: 10/03/22  4:47 AM   Result Value Ref Range    Magnesium 2.2 1.6 - 2.4 mg/dL   PHOSPHORUS    Collection Time: 10/03/22  4:47 AM   Result Value Ref Range    Phosphorus 2.7 2.6 - 4.7 MG/DL   CBC WITH AUTOMATED DIFF    Collection Time: 10/03/22  4:47 AM   Result Value Ref Range    WBC 14.2 (H) 3.6 - 11.0 K/uL    RBC 4.86 3.80 - 5.20 M/uL    HGB 11.7 11.5 - 16.0 g/dL    HCT 37.6 35.0 - 47.0 %    MCV 77.4 (L) 80.0 - 99.0 FL    MCH 24.1 (L) 26.0 - 34.0 PG    MCHC 31.1 30.0 - 36.5 g/dL    RDW 12.9 11.5 - 14.5 %    PLATELET 66 (L) 562 - 400 K/uL    MPV ABNORMAL 8.9 - 12.9 FL    NRBC 0.0 0  WBC    ABSOLUTE NRBC 0.00 0.00 - 0.01 K/uL    NEUTROPHILS 86 (H) 32 - 75 %    LYMPHOCYTES 8 (L) 12 - 49 %    MONOCYTES 6 5 - 13 %    EOSINOPHILS 0 0 - 7 %    BASOPHILS 0 0 - 1 %    IMMATURE GRANULOCYTES 0 0.0 - 0.5 %    ABS. NEUTROPHILS 12.2 (H) 1.8 - 8.0 K/UL    ABS. LYMPHOCYTES 1.1 0.8 - 3.5 K/UL    ABS. MONOCYTES 0.9 0.0 - 1.0 K/UL    ABS. EOSINOPHILS 0.0 0.0 - 0.4 K/UL    ABS. BASOPHILS 0.0 0.0 - 0.1 K/UL    ABS. IMM.  GRANS. 0.0 0.00 - 0.04 K/UL    DF SMEAR SCANNED      PLATELET COMMENTS Large Platelets      RBC COMMENTS MICROCYTOSIS  1+        RBC COMMENTS HYPOCHROMIA  1+        RBC COMMENTS BUSTER CELLS  PRESENT        RBC COMMENTS OVALOCYTES  PRESENT       PROTHROMBIN TIME + INR    Collection Time: 10/03/22 4:47 AM   Result Value Ref Range    INR 1.3 (H) 0.9 - 1.1      Prothrombin time 13.2 (H) 9.0 - 11.1 sec   NT-PRO BNP    Collection Time: 10/03/22  7:45 AM   Result Value Ref Range    NT pro-BNP 18,212 (H) <450 PG/ML   PTT    Collection Time: 10/03/22  9:35 AM   Result Value Ref Range    aPTT 76.8 (H) 22.1 - 31.0 sec    aPTT, therapeutic range     58.0 - 77.0 SECS   GLUCOSE, POC    Collection Time: 10/03/22  2:10 PM   Result Value Ref Range    Glucose (POC) 126 (H) 65 - 117 mg/dL    Performed by Jennifer Jaimes      No results displayed because visit has over 200 results.         EKG Results       Procedure 720 Value Units Date/Time    EKG, 12 LEAD, INITIAL [[id number]] Collected: 10/03/22 0440    Order Status: Completed Updated: 10/03/22 0444     Ventricular Rate 60 BPM      Atrial Rate 60 BPM      P-R Interval 158 ms      QRS Duration 88 ms      Q-T Interval 582 ms      QTC Calculation (Bezet) 582 ms      Calculated R Axis -35 degrees      Calculated T Axis 124 degrees      Diagnosis --     Normal sinus rhythm  Left axis deviation  Septal infarct , age undetermined  T wave abnormality, consider lateral ischemia  Prolonged QT  When compared with ECG of 02-OCT-2022 06:39,  Significant changes have occurred      EKG, 12 LEAD, INITIAL [[id number]] Collected: 10/02/22 0639    Order Status: Completed Updated: 10/03/22 0303     Ventricular Rate 47 BPM      Atrial Rate 47 BPM      P-R Interval 88 ms      QRS Duration 156 ms      Q-T Interval 754 ms      QTC Calculation (Bezet) 667 ms      Calculated R Axis -83 degrees      Calculated T Axis 104 degrees      Diagnosis --     Marked sinus bradycardia with short MS with fusion complexes  Left axis deviation  Nonspecific intraventricular block  Possible Lateral infarct , age undetermined  Inferior infarct , age undetermined  When compared with ECG of 01-OCT-2022 22:02,  Significant changes have occurred      EKG, 12 LEAD, INITIAL [[id number]]     Order Status: Sent     EKG, 12 LEAD, INITIAL [[id number]]     Order Status: Sent               RADIOGRAPHIC STUDIES:  ECHO ADULT COMPLETE    Left Ventricle: Severely reduced left ventricular systolic function   with a visually estimated EF of 10 -15%. Left ventricle is severely   dilated. Normal wall thickness. Severe global hypokinesis present. Right Ventricle: Moderately reduced systolic function. Left Atrium: Left atrium is mildly dilated. Right Atrium: Right atrium is dilated. Technical qualifiers: Echo study was technically difficult. DIAGNOSES:  Cardiac Arrest  NSTEMI  Alzheimer's Disease  HTN    IMPRESSION AND PLAN:    Neuro-  Delirium prevention strategies    Cardiac  Continue hemodynamic monitoring, map goal greater than 65, EF on most recent echo 10 to 15%, TAPSE 1.4 cm, check a CVP, I added dobutamine this morning-continue for now, norepinephrine/vasopressin as needed to maintain maps, on a heparin drip for possible NSTEMI-continue for now, follow-up cardiology and advanced heart failure recommendations    Pulm  Extubated this afternoon, supplemental oxygen as needed, saturation goal greater than 90%    GI  Diet as tolerated, on H2 RA therapy    Renal  Kidney function improving, maintain and even to negative fluid balance daily for now, monitor urine output closely, dose medication renally, avoid nephrotoxins, correct electrolyte derangements as needed    Heme  As above-on a heparin drip, monitor for bleeding    ID  UA positive on admission, send urine cultures and start ceftriaxone for now    Endo-  Keep glucose less than 180    Critical care time-40 minutes    The patient is critically ill with single or multiple systems failure, severe metabolic derangement and/or infection, has potential for life threatening deterioration, requires high complexity decision making, frequent evaluation and titration of therapies and interpretation of data. This note has been written with voice recognition software.  While this note has been edited for accuracy, the software periodically misinterprets speech resulting in errors that might not have been caught in editing. In the event an unusual error is found in this record, please read the chart carefully and recognize, using context, where these substitutions or errors have occurred and please notify me to resolve the errors.

## 2022-10-03 NOTE — CARDIO/PULMONARY
[facility]: 64 yo female with post-cardiac arrest, bystander CPR, VFib, defib & elevated troponin (10/1). Patient resides with her   in [place]. PMH includes Alzheimer's dementia. Per Dr Mary Mccallum, pt transferred from ED to [facility] for further management.

## 2022-10-03 NOTE — PROGRESS NOTES
[facility] in [place], [place]  Inpatient Progress Note    Patient name: Brenda Rubio  Patient : 1945  Patient MRN: [id number]  Consulting MD: Donna Moody MD  Date of service: 10/03/22    REASON FOR REFERRAL:  Management of cardiogenic shock    PLAN OF CARE:  64 y/o with h/o new diagnosis of dilated cardiomyopathy, LVEF 10-15%, stage C, NYHA class IIIA presents with VF cardiac arrest c/b acute renal and hepatic failure now improving; extubated  Continue inotropic support with dobutamine; hopefully LVEF and hemodynamics improve to where she could be weaned off prior to discharge; otherwise poor candidate for home inotropes  Timing of LHC/RHC per primary team; will obtain non-invasive NICOM today    RECOMMENDATIONS:  Continue dobutamine 5 mcg/kg/min; check NICOM  Continue midodrine 20mg PO TID  Cannot tolerate beta-blockers due to hypotension and dobutamine gtt  Cannot tolerate ARNi/ACEi/ARB due to SOCRATES  Cannot tolerate MRA due to SOCRATES  Cannot tolerate SGLT2i inhibitor due to UTI  No diuretics prescribed; appears euvolemic  Not on allopurinol or uloric, check uric acid level  Continue heparin gtt per primary cardiology  Not on aspirin  Not on statins  Will need IP evaluation for JOHANA  Complete ACP and uncertain if patient wishes ICD  Add HF screening labs: pro-NT-BNP, iron profile with ferritin, TSH, vitamin D level, CPK, gammopathy profile, hepatitis profile, INR, KENNY  Consider genetic testing before discharge  Nutritionist consult and heart failure education when patient recovers  Advanced care plan   Recommend flu, covid and pneumonia vaccinations at discharge    IMPRESSION:  Sinus bradycardia  Acute on chronic systolic heart failure  Stage C, NYHA class IV symptoms  Dilated cardiomyopathy, LVEF 10-15%  C/b VF cardiac arrest  C/b cardiogenic shock  C/b renal and hepatic failure  Cardiac risk factors:  HTN  Leukocytosis  UTI?   Neurocognitive dysfunction  Alzheimer disease  Cannot drive  Memory loss    LIFE GOALS:  Lifestyle goals reviewed with the patient. Patient's personal goals include: TBD  Important upcoming milestones or family events: TBD  The patient identifies the following as important for living well: TBD    INTERVAL HISTORY:  No complaints  /40, HR 50s  FiO2 40%  I/O net positive 978cc, urine 950cc  WBC 14.2 from 15.2  HGB 11.7  PLT 66 from 132  UA with yeast and WBC 10-20  aPTT 76.8  K 3.5  Cr 1.4 from 1.68  TBili 1.9 from 2.6  Lactic 1.8 from 2.3  proNTBNP 67874 from 1265! HISTORY OF PRESENT ILLNESS:  Brenda Rubio is a 65 y.o. female with newly diagnosed few weeks ago severe cardiomyopathy LVEF 25-30% was awaiting CT angiogram had cardiac arrest and collapsed at dinner table. CPR was initiated by her  right away and then paramedic within 7 minutes. She was transferred to [facility] initiated on code ice protocol. She was started on diuretics due to significant dyspnea, family described NYHA class IIIB symptoms, which she took for 7 days. CARDIAC IMAGING:  Echo (10/2/22)    Left Ventricle: Severely reduced left ventricular systolic function with a visually estimated EF of 10 -15%. Left ventricle is severely dilated. Normal wall thickness. Severe global hypokinesis present. Right Ventricle: Moderately reduced systolic function. Left Atrium: Left atrium is mildly dilated. Right Atrium: Right atrium is dilated. Technical qualifiers: Echo study was technically difficult. EKG (10/3/22) NSR, septal infarct  LHC not done    HEMODYNAMICS:  RHC not done  CPEST not done  6MW not done    OTHER IMAGING:  CXR   CXR Results  (Last 48 hours)                 10/02/22 0452  XR CHEST PORT Final result    Impression:  Tubes and lines in appropriate position. Clear lungs. Narrative:  INDICATION: Endotracheal tube placement.        Portable AP view of the chest.       Direct comparison made to prior chest x-ray dated October 1, 2022. Cardiomediastinal silhouette is stable. ET tube tip is 3.9 cm superior to the   level of the dorothy. NG tube extends to stomach. Lungs are clear bilaterally. No   pleural fluid is visualized. There is no pneumothorax. The osseous structures   are diffusely demineralized. 10/01/22 2217  XR CHEST PORT Final result    Impression:  No acute findings. Narrative:  EXAM: XR CHEST PORT       INDICATION: post intubation       COMPARISON: None. FINDINGS: A portable AP radiograph of the chest was obtained at 2213 hours. There is an endotracheal tube terminating at the thoracic inlet projecting 7 cm   proximal to the level of the dorothy. The lungs and pleural margins are clear. Cardiac size is upper limits of normal. There is mild thoracic aortic tortuosity   with otherwise normal-appearing mediastinal and hilar contour. The bones are   moderately osteopenic. Head CT (10/1/22)  There is a prominent metallic artifact in the suprasellar region with distortion  of the field. There is mild prominence of the ventricles suggesting central  atrophy. tmild bilateral periventricular diminished attenuation shown consistent  with chronic small vessel ischemic disease the white matter. There is no  intracranial hemorrhage, extra-axial collection, or mass effect. The basilar  cisterns are open. No CT evidence of acute infarct. The bone windows demonstrate no abnormalities. The visualized portions of the  paranasal sinuses and mastoid air cells are clear. IMPRESSION     1. Suboptimal study due to prominent suprasellar surgical artifact. 2. Mild central atrophy. 3. No acute intracranial findings.     PHYSICAL EXAM:  Visit Vitals  BP (!) 101/42 (BP 1 Location: Left upper arm, BP Patient Position: At rest)   Pulse (!) 57   Temp 97.3 °F (36.3 °C)   Resp 14   Ht 5' 2.99\" (1.6 m)   Wt 107 lb 12.9 oz (48.9 kg)   SpO2 100%   BMI 19.10 kg/m²     General: Patient is well developed, well-nourished in no acute distress  HEENT: Normocephalic and atraumatic. Intubated. Opens eyes and follows commands appropriately. Neck: Supple. No evidence of thyroid enlargements or lymphadenopathy. JVD: Cannot be appreciated   Lungs: Breath sounds are equal and clear bilaterally. No wheezes, rhonchi, or rales. Heart: Regular rate and rhythm with normal S1 and S2. No murmurs, gallops or rubs. Abdomen: Soft, no mass or tenderness. No organomegaly or hernia. Bowel sounds present. Genitourinary and rectal: deferred  Extremities: No cyanosis, clubbing, or edema. Neurologic: No focal sensory or motor deficits are noted. Grossly intact. Psychiatric: Follows commands appropriately  Skin: Warm, dry and well perfused. No lesions, nodules or rashes are noted. REVIEW OF SYSTEMS:  Unable to obtain. PAST MEDICAL HISTORY:  Past Medical History:   Diagnosis Date    Arthritis     Chronic pain     \"my right side has been hurting for 3-4 months\"    Hypertension        PAST SURGICAL HISTORY:  Past Surgical History:   Procedure Laterality Date    HX BREAST BIOPSY Bilateral     neg    HX BREAST REDUCTION Bilateral 2000    HX GI  2004    colon resection after perforation during ovarian cyst removal    HX HEENT      skin cyst removed from neck    HX HEENT      uvuloplasty    HX HYSTERECTOMY      and bladder repair    HX SHOULDER ARTHROSCOPY      right    HX UROLOGICAL      bladder repair during hysterectomy       FAMILY HISTORY:  No family history on file. SOCIAL HISTORY:  Social History     Socioeconomic History    Marital status:    Tobacco Use    Smoking status: Former     Packs/day: 0.25     Types: Cigarettes     Quit date:      Years since quittin.7    Smokeless tobacco: Never   Substance and Sexual Activity    Alcohol use: No    Drug use: No       LABORATORY RESULTS:     Labs Latest Ref Rng & Units 10/3/2022 10/2/2022 10/2/2022 10/1/2022   WBC 3.6 - 11.0 K/uL 14. 2(H) - 15. 2(H) 6.5 RBC 3.80 - 5.20 M/uL 4.86 - 5.04 5.22(H)   Hemoglobin 11.5 - 16.0 g/dL 11.7 - 12.3 12.8   Hematocrit 35.0 - 47.0 % 37.6 - 39.0 41.8   MCV 80.0 - 99.0 FL 77. 4(L) - 77. 4(L) 80.1   Platelets 219 - 161 K/uL 66(L) - 132(L) 131(L)   Lymphocytes 12 - 49 % 8(L) - 4(L) 37   Monocytes 5 - 13 % 6 - 5 4(L)   Eosinophils 0 - 7 % 0 - 0 0   Basophils 0 - 1 % 0 - 0 0   Bands 0 - 6 % - - - 1   Albumin 3.5 - 5.0 g/dL 3. 3(L) - 2. 9(L) 2. 8(L)   Calcium 8.5 - 10.1 MG/DL 8.6 8.4(L) 8.2(L) 8.4(L)   Glucose 65 - 100 mg/dL 161(H) 229(H) 249(H) 208(H)   BUN 6 - 20 MG/DL 34(H) 41(H) 44(H) 44(H)   Creatinine 0.55 - 1.02 MG/DL 1.41(H) 1.68(H) 1.67(H) 1.84(H)   Sodium 136 - 145 mmol/L 134(L) 134(L) 134(L) 134(L)   Potassium 3.5 - 5.1 mmol/L 3.5 4.4 2.5(LL) 3. 0(L)   Some recent data might be hidden     No results found for: TSH, TSH2, TSH3, TSHP, TSHELE, TSHEXT    ALLERGY:  Allergies   Allergen Reactions    Iron Other (comments)     IV IRON only gave her convulsions        CURRENT MEDICATIONS:    Current Facility-Administered Medications:     midodrine (PROAMATINE) tablet 20 mg, 20 mg, Oral, Q8H, Alfonso Broussard MD    cefTRIAXone (ROCEPHIN) 1 g in 0.9% sodium chloride 10 mL IV syringe, 1 g, IntraVENous, Q24H, Alfonso Broussard MD, 1 g at 10/03/22 0905    DOBUTamine (DOBUTREX) 500 mg/250 mL (2,000 mcg/mL) infusion, 2.5 mcg/kg/min, IntraVENous, CONTINUOUS, Alfonso Broussard MD, Last Rate: 3.7 mL/hr at 10/03/22 0756, 2.5 mcg/kg/min at 10/03/22 0756    alteplase (CATHFLO) 1 mg in sterile water (preservative free) 1 mL injection, 1 mg, InterCATHeter, PRN, Alfonso Bush MD    vasopressin (VASOSTRICT) 20 Units in 0.9% sodium chloride 100 mL infusion, 0.04 Units/min, IntraVENous, CONTINUOUS, Alfonso Broussard MD    NOREPINephrine (LEVOPHED) 8 mg in 5% dextrose 250mL (32 mcg/mL) infusion, 0.5-30 mcg/min, IntraVENous, TITRATE, Cielo Gaviria DO, Last Rate: 37.5 mL/hr at 10/03/22 0923, 20 mcg/min at 10/03/22 5833 amiodarone (CORDARONE) 375 mg/250 mL D5W infusion, 0.5-1 mg/min, IntraVENous, TITRATE, Leverne Angry, DO, Stopped at 10/02/22 0647    propofol (DIPRIVAN) 10 mg/mL infusion, 0-50 mcg/kg/min, IntraVENous, TITRATE, Leverne Angry, DO, Last Rate: 3 mL/hr at 10/03/22 0936, 10 mcg/kg/min at 10/03/22 0936    fentaNYL (PF) 1,500 mcg/30 mL (50 mcg/mL) infusion, 0-200 mcg/hr, IntraVENous, TITRATE, Leverne Angry, DO, Stopped at 10/03/22 0428    0.9% sodium chloride infusion, 75 mL/hr, IntraVENous, CONTINUOUS, Leverne Angry, DO, Stopped at 10/02/22 1020    sodium chloride (NS) flush 5-40 mL, 5-40 mL, IntraVENous, Q8H, Liu, Jarod G, DO, 10 mL at 10/02/22 1508    sodium chloride (NS) flush 5-40 mL, 5-40 mL, IntraVENous, PRN, Leverne Angry, DO    acetaminophen (TYLENOL) tablet 650 mg, 650 mg, Oral, Q6H PRN **OR** acetaminophen (TYLENOL) suppository 650 mg, 650 mg, Rectal, Q6H PRN, Leverne Angry, DO    polyethylene glycol (MIRALAX) packet 17 g, 17 g, Oral, DAILY, Leverne Angry, DO, 17 g at 10/03/22 4565    senna-docusate (PERICOLACE) 8.6-50 mg per tablet 1 Tablet, 1 Tablet, Oral, BID, Darionne Angry, DO, 1 Tablet at 10/03/22 3484    magnesium hydroxide (MILK OF MAGNESIA) 400 mg/5 mL oral suspension 30 mL, 30 mL, Oral, DAILY PRN, Leverne Angry, DO    sodium phosphate (FLEET'S) enema 1 Enema, 1 Enema, Rectal, DAILY PRN, Leverne Angry, DO    ondansetron [facility]) injection 4 mg, 4 mg, IntraVENous, Q6H PRN, Dandre Herringu G, DO    heparin 25,000 units in D5W 250 ml infusion, 12-25 Units/kg/hr, IntraVENous, TITRATE, Leverne Angry, DO, Last Rate: 4.5 mL/hr at 10/03/22 0753, 9 Units/kg/hr at 10/03/22 0753    chlorhexidine (PERIDEX) 0.12 % mouthwash 15 mL, 15 mL, Oral, Q12H, Jarod Liu DO, 15 mL at 10/03/22 0947    famotidine (PEPCID) tablet 20 mg, 20 mg, Oral, DAILY, Christia Homans, MD, 20 mg at 10/03/22 0023    PATIENT CARE TEAM:  Patient Care Team:  Jim Refugio Zaldivar MD as PCP - General (Family Medicine)  Klaudia No MD as PCP - REHABILITATION [facility] Empaneled Provider     Thank you for allowing me to participate in this patient's care.     Kim Guadalupe MD   [address] [place] [address], [address]  Phone: [phone]    Critically ill  Critical care 45 min

## 2022-10-03 NOTE — PROGRESS NOTES
Occupational Therapy:     Chart reviewed, patient received sedated and on vent. Per ABCDEF protocol, will work with patient when PEEP is 10.0 or less, FIO2 60% or less, and patient is following basic commands. Will follow patient peripherally. Recommend nursing to complete with patient, as able and per protocol, in order to promote cardiopulmonary systems, maintain strength, endurance and independence:   -bed in chair position or maximize full reverse Trendelenburg position to facilitate upright activity with foot board and non-skid footwear on 3x/day ~30-60 mins each   -ROM during bathing B UEs and LEs  -positioning to prevent contractures and edema  RASS -1/0/+1 (during SAT) Active ROM  Sitting (bed in chair position)  Standing (reverse Trendelenburg)  ADLs   RASS -3/2 Passive ROM  Sit (bed in chair position)   RASS -5/-4 Passive ROM       Thank you for your assistance.    Sharri Pool, OTEDNA, OTR/L

## 2022-10-03 NOTE — PROGRESS NOTES
Physical Therapy  10/3/2022    Chart reviewed, patient received sedated and on vent. Per ABCDEF protocol, will work with patient when PEEP is 10.0 or less, FIO2 60% or less, and patient is following basic commands. Will follow patient peripherally. Recommend nursing to complete with patient, as able and per protocol, in order to promote cardiopulmonary systems, maintain strength, endurance and independence:   -bed in chair position or maximize full reverse Trendelenburg position to facilitate upright activity with foot board and non-skid footwear on 3x/day ~30-60 mins each   -ROM during bathing B UEs and LEs  -positioning to prevent contractures and edema. RASS -1/0/+1 (during SAT) Active ROM  Sitting (bed in chair position)  Standing (reverse Trendelenburg)  ADLs   RASS -3/2 Passive ROM  Sit (bed in chair position)   RASS -5/-4 Passive ROM     Thank you for your assistance.        Renetta Herrmann, PT, DPT

## 2022-10-03 NOTE — PROGRESS NOTES
0730 Bedside, Verbal, and Written shift change report given to Wayne Blair, RN and Kelly(oncoming nurse) by Whitney Leach (offgoing nurse). Report included the following information SBAR, Kardex, Intake/Output, Recent Results, and Cardiac Rhythm   . Sabrina Nice at bedside. SBT started. 18 Updated Dr. Marie Matta to extubate. 1320 Extubated to 4L NC.    1930 NICOM - CO: 4.7 CI:2.9 [id number]    1930 Bedside, Verbal, and Written shift change report given to Maryellen Dennis Rn (oncoming nurse) by Wayne Blair RN and Katlin Gregg (offgoing nurse). Report included the following information Intake/Output, MAR, and Recent Results.

## 2022-10-03 NOTE — PROGRESS NOTES
1930: Bedside report received from [address] [place] [address]    0300: Orders for TF received and started. 0430: MD at bedside, orders to stop fentanyl gtt received. 0730: Bedside report given to [facility]

## 2022-10-03 NOTE — PROGRESS NOTES
Speech Pathology:  Chart reviewed and discussed with RN. Patient remains intubated and therefore assessment deferred. SLP to follow for assessment as patient medically appropriate. Thank you.     Davian Gann, SLP

## 2022-10-04 ENCOUNTER — APPOINTMENT (OUTPATIENT)
Dept: GENERAL RADIOLOGY | Age: 77
DRG: 222 | End: 2022-10-04
Attending: EMERGENCY MEDICINE
Payer: MEDICARE

## 2022-10-04 PROBLEM — I50.21 ACUTE SYSTOLIC HEART FAILURE (HCC): Status: ACTIVE | Noted: 2022-01-01

## 2022-10-04 PROBLEM — I50.21 ACUTE SYSTOLIC HEART FAILURE (HCC): Status: ACTIVE | Noted: 2022-10-04

## 2022-10-04 LAB
25(OH)D3 SERPL-MCNC: 20.9 NG/ML (ref 30–100)
ALBUMIN SERPL-MCNC: 2.8 G/DL (ref 3.5–5)
ALBUMIN/GLOB SERPL: 1.6 {RATIO} (ref 1.1–2.2)
ALP SERPL-CCNC: 78 U/L (ref 45–117)
ALT SERPL-CCNC: 60 U/L (ref 12–78)
AMMONIA PLAS-SCNC: 17 UMOL/L
ANION GAP SERPL CALC-SCNC: 6 MMOL/L (ref 5–15)
APTT PPP: 68 SEC (ref 22.1–31)
APTT PPP: 70.4 SEC (ref 22.1–31)
AST SERPL-CCNC: 31 U/L (ref 15–37)
BASOPHILS # BLD: 0 K/UL (ref 0–0.1)
BASOPHILS NFR BLD: 0 % (ref 0–1)
BILIRUB SERPL-MCNC: 1.6 MG/DL (ref 0.2–1)
BNP SERPL-MCNC: ABNORMAL PG/ML
BUN SERPL-MCNC: 26 MG/DL (ref 6–20)
BUN/CREAT SERPL: 27 (ref 12–20)
CALCIUM SERPL-MCNC: 8.4 MG/DL (ref 8.5–10.1)
CHLORIDE SERPL-SCNC: 102 MMOL/L (ref 97–108)
CK SERPL-CCNC: 201 U/L (ref 26–192)
CO2 SERPL-SCNC: 25 MMOL/L (ref 21–32)
CORTIS SERPL-MCNC: 24 UG/DL
CREAT SERPL-MCNC: 0.98 MG/DL (ref 0.55–1.02)
CRP SERPL-MCNC: 4.95 MG/DL (ref 0–0.6)
DIFFERENTIAL METHOD BLD: ABNORMAL
EOSINOPHIL # BLD: 0.1 K/UL (ref 0–0.4)
EOSINOPHIL NFR BLD: 2 % (ref 0–7)
ERYTHROCYTE [DISTWIDTH] IN BLOOD BY AUTOMATED COUNT: 12.8 % (ref 11.5–14.5)
ERYTHROCYTE [SEDIMENTATION RATE] IN BLOOD: 3 MM/HR (ref 0–30)
FERRITIN SERPL-MCNC: 322 NG/ML (ref 26–388)
GLOBULIN SER CALC-MCNC: 1.8 G/DL (ref 2–4)
GLUCOSE SERPL-MCNC: 92 MG/DL (ref 65–100)
HCT VFR BLD AUTO: 28.4 % (ref 35–47)
HGB BLD-MCNC: 9 G/DL (ref 11.5–16)
IMM GRANULOCYTES # BLD AUTO: 0.1 K/UL (ref 0–0.04)
IMM GRANULOCYTES NFR BLD AUTO: 1 % (ref 0–0.5)
INR PPP: 1.2 (ref 0.9–1.1)
IRON SATN MFR SERPL: 23 % (ref 20–50)
IRON SERPL-MCNC: 37 UG/DL (ref 35–150)
LACTATE SERPL-SCNC: 1.4 MMOL/L (ref 0.4–2)
LYMPHOCYTES # BLD: 0.6 K/UL (ref 0.8–3.5)
LYMPHOCYTES NFR BLD: 9 % (ref 12–49)
MAGNESIUM SERPL-MCNC: 1.6 MG/DL (ref 1.6–2.4)
MCH RBC QN AUTO: 24.9 PG (ref 26–34)
MCHC RBC AUTO-ENTMCNC: 31.7 G/DL (ref 30–36.5)
MCV RBC AUTO: 78.7 FL (ref 80–99)
MONOCYTES # BLD: 0.7 K/UL (ref 0–1)
MONOCYTES NFR BLD: 10 % (ref 5–13)
NEUTS SEG # BLD: 5.6 K/UL (ref 1.8–8)
NEUTS SEG NFR BLD: 78 % (ref 32–75)
NRBC # BLD: 0 K/UL (ref 0–0.01)
NRBC BLD-RTO: 0 PER 100 WBC
PHOSPHATE SERPL-MCNC: 1.9 MG/DL (ref 2.6–4.7)
PLATELET # BLD AUTO: 39 K/UL (ref 150–400)
POTASSIUM SERPL-SCNC: 3.9 MMOL/L (ref 3.5–5.1)
PROCALCITONIN SERPL-MCNC: 1.58 NG/ML
PROT SERPL-MCNC: 4.6 G/DL (ref 6.4–8.2)
PROTHROMBIN TIME: 12.4 SEC (ref 9–11.1)
RBC # BLD AUTO: 3.61 M/UL (ref 3.8–5.2)
RBC MORPH BLD: ABNORMAL
RBC MORPH BLD: ABNORMAL
SODIUM SERPL-SCNC: 133 MMOL/L (ref 136–145)
THERAPEUTIC RANGE,PTTT: ABNORMAL SECS (ref 58–77)
THERAPEUTIC RANGE,PTTT: ABNORMAL SECS (ref 58–77)
TIBC SERPL-MCNC: 164 UG/DL (ref 250–450)
TSH SERPL DL<=0.05 MIU/L-ACNC: 0.41 UIU/ML (ref 0.36–3.74)
URATE SERPL-MCNC: 3.5 MG/DL (ref 2.6–6)
WBC # BLD AUTO: 7.1 K/UL (ref 3.6–11)

## 2022-10-04 PROCEDURE — 92610 EVALUATE SWALLOWING FUNCTION: CPT

## 2022-10-04 PROCEDURE — 74011000258 HC RX REV CODE- 258: Performed by: EMERGENCY MEDICINE

## 2022-10-04 PROCEDURE — 74011000250 HC RX REV CODE- 250: Performed by: STUDENT IN AN ORGANIZED HEALTH CARE EDUCATION/TRAINING PROGRAM

## 2022-10-04 PROCEDURE — 86022 PLATELET ANTIBODIES: CPT

## 2022-10-04 PROCEDURE — 82550 ASSAY OF CK (CPK): CPT

## 2022-10-04 PROCEDURE — 86140 C-REACTIVE PROTEIN: CPT

## 2022-10-04 PROCEDURE — 99233 SBSQ HOSP IP/OBS HIGH 50: CPT | Performed by: INTERNAL MEDICINE

## 2022-10-04 PROCEDURE — 74011000250 HC RX REV CODE- 250: Performed by: EMERGENCY MEDICINE

## 2022-10-04 PROCEDURE — 97165 OT EVAL LOW COMPLEX 30 MIN: CPT

## 2022-10-04 PROCEDURE — 82306 VITAMIN D 25 HYDROXY: CPT

## 2022-10-04 PROCEDURE — 84443 ASSAY THYROID STIM HORMONE: CPT

## 2022-10-04 PROCEDURE — 83880 ASSAY OF NATRIURETIC PEPTIDE: CPT

## 2022-10-04 PROCEDURE — 82533 TOTAL CORTISOL: CPT

## 2022-10-04 PROCEDURE — 82728 ASSAY OF FERRITIN: CPT

## 2022-10-04 PROCEDURE — 83540 ASSAY OF IRON: CPT

## 2022-10-04 PROCEDURE — 84145 PROCALCITONIN (PCT): CPT

## 2022-10-04 PROCEDURE — 74011250637 HC RX REV CODE- 250/637: Performed by: EMERGENCY MEDICINE

## 2022-10-04 PROCEDURE — 74011000250 HC RX REV CODE- 250: Performed by: ANESTHESIOLOGY

## 2022-10-04 PROCEDURE — 85652 RBC SED RATE AUTOMATED: CPT

## 2022-10-04 PROCEDURE — 97162 PT EVAL MOD COMPLEX 30 MIN: CPT

## 2022-10-04 PROCEDURE — 74011250636 HC RX REV CODE- 250/636: Performed by: STUDENT IN AN ORGANIZED HEALTH CARE EDUCATION/TRAINING PROGRAM

## 2022-10-04 PROCEDURE — 82140 ASSAY OF AMMONIA: CPT

## 2022-10-04 PROCEDURE — 97530 THERAPEUTIC ACTIVITIES: CPT

## 2022-10-04 PROCEDURE — 86316 IMMUNOASSAY TUMOR OTHER: CPT

## 2022-10-04 PROCEDURE — 80053 COMPREHEN METABOLIC PANEL: CPT

## 2022-10-04 PROCEDURE — 97161 PT EVAL LOW COMPLEX 20 MIN: CPT

## 2022-10-04 PROCEDURE — 65620000000 HC RM CCU GENERAL

## 2022-10-04 PROCEDURE — 93005 ELECTROCARDIOGRAM TRACING: CPT

## 2022-10-04 PROCEDURE — 85610 PROTHROMBIN TIME: CPT

## 2022-10-04 PROCEDURE — 83605 ASSAY OF LACTIC ACID: CPT

## 2022-10-04 PROCEDURE — 36415 COLL VENOUS BLD VENIPUNCTURE: CPT

## 2022-10-04 PROCEDURE — 74011250637 HC RX REV CODE- 250/637: Performed by: INTERNAL MEDICINE

## 2022-10-04 PROCEDURE — 84550 ASSAY OF BLOOD/URIC ACID: CPT

## 2022-10-04 PROCEDURE — 97535 SELF CARE MNGMENT TRAINING: CPT

## 2022-10-04 PROCEDURE — 82784 ASSAY IGA/IGD/IGG/IGM EACH: CPT

## 2022-10-04 PROCEDURE — 71045 X-RAY EXAM CHEST 1 VIEW: CPT

## 2022-10-04 PROCEDURE — 85025 COMPLETE CBC W/AUTO DIFF WBC: CPT

## 2022-10-04 PROCEDURE — 86038 ANTINUCLEAR ANTIBODIES: CPT

## 2022-10-04 PROCEDURE — 74011250636 HC RX REV CODE- 250/636: Performed by: EMERGENCY MEDICINE

## 2022-10-04 PROCEDURE — 85730 THROMBOPLASTIN TIME PARTIAL: CPT

## 2022-10-04 PROCEDURE — 74011250637 HC RX REV CODE- 250/637: Performed by: STUDENT IN AN ORGANIZED HEALTH CARE EDUCATION/TRAINING PROGRAM

## 2022-10-04 PROCEDURE — 83735 ASSAY OF MAGNESIUM: CPT

## 2022-10-04 PROCEDURE — 84100 ASSAY OF PHOSPHORUS: CPT

## 2022-10-04 RX ORDER — POTASSIUM CHLORIDE 29.8 MG/ML
20 INJECTION INTRAVENOUS ONCE
Status: COMPLETED | OUTPATIENT
Start: 2022-10-04 | End: 2022-10-04

## 2022-10-04 RX ORDER — MIDODRINE HYDROCHLORIDE 5 MG/1
10 TABLET ORAL EVERY 8 HOURS
Status: DISCONTINUED | OUTPATIENT
Start: 2022-10-04 | End: 2022-10-04

## 2022-10-04 RX ORDER — LIDOCAINE 4 G/100G
1 PATCH TOPICAL EVERY 24 HOURS
Status: DISCONTINUED | OUTPATIENT
Start: 2022-10-04 | End: 2022-10-12

## 2022-10-04 RX ORDER — ERGOCALCIFEROL 1.25 MG/1
50000 CAPSULE ORAL
Status: DISCONTINUED | OUTPATIENT
Start: 2022-10-04 | End: 2022-10-13 | Stop reason: HOSPADM

## 2022-10-04 RX ORDER — MAGNESIUM SULFATE HEPTAHYDRATE 40 MG/ML
2 INJECTION, SOLUTION INTRAVENOUS
Status: COMPLETED | OUTPATIENT
Start: 2022-10-04 | End: 2022-10-04

## 2022-10-04 RX ORDER — MIDODRINE HYDROCHLORIDE 5 MG/1
20 TABLET ORAL EVERY 8 HOURS
Status: DISCONTINUED | OUTPATIENT
Start: 2022-10-04 | End: 2022-10-06

## 2022-10-04 RX ORDER — SPIRONOLACTONE 25 MG/1
12.5 TABLET ORAL
Status: COMPLETED | OUTPATIENT
Start: 2022-10-04 | End: 2022-10-04

## 2022-10-04 RX ORDER — THERA TABS 400 MCG
1 TAB ORAL DAILY
Status: DISCONTINUED | OUTPATIENT
Start: 2022-10-04 | End: 2022-10-13 | Stop reason: HOSPADM

## 2022-10-04 RX ORDER — GUAIFENESIN 100 MG/5ML
100 SOLUTION ORAL
Status: DISCONTINUED | OUTPATIENT
Start: 2022-10-04 | End: 2022-10-13 | Stop reason: HOSPADM

## 2022-10-04 RX ADMIN — SPIRONOLACTONE 12.5 MG: 25 TABLET ORAL at 09:12

## 2022-10-04 RX ADMIN — VASOPRESSIN 0.01 UNITS/MIN: 20 INJECTION PARENTERAL at 12:39

## 2022-10-04 RX ADMIN — MIDODRINE HYDROCHLORIDE 20 MG: 5 TABLET ORAL at 21:07

## 2022-10-04 RX ADMIN — HEPARIN SODIUM 10 UNITS/KG/HR: 10000 INJECTION, SOLUTION INTRAVENOUS at 01:46

## 2022-10-04 RX ADMIN — THERA TABS 1 TABLET: TAB at 08:14

## 2022-10-04 RX ADMIN — ERGOCALCIFEROL 50000 UNITS: 1.25 CAPSULE ORAL at 09:15

## 2022-10-04 RX ADMIN — SODIUM CHLORIDE, PRESERVATIVE FREE 10 ML: 5 INJECTION INTRAVENOUS at 13:57

## 2022-10-04 RX ADMIN — CHLORHEXIDINE GLUCONATE 15 ML: 1.2 RINSE ORAL at 21:07

## 2022-10-04 RX ADMIN — MAGNESIUM SULFATE HEPTAHYDRATE 2 G: 40 INJECTION, SOLUTION INTRAVENOUS at 08:14

## 2022-10-04 RX ADMIN — SODIUM CHLORIDE 1 G: 9 INJECTION INTRAMUSCULAR; INTRAVENOUS; SUBCUTANEOUS at 08:14

## 2022-10-04 RX ADMIN — POTASSIUM CHLORIDE 20 MEQ: 750 TABLET, FILM COATED, EXTENDED RELEASE ORAL at 08:14

## 2022-10-04 RX ADMIN — ACETAMINOPHEN 650 MG: 325 TABLET, FILM COATED ORAL at 12:58

## 2022-10-04 RX ADMIN — SODIUM CHLORIDE, PRESERVATIVE FREE 10 ML: 5 INJECTION INTRAVENOUS at 06:00

## 2022-10-04 RX ADMIN — DOBUTAMINE HYDROCHLORIDE 2.5 MCG/KG/MIN: 200 INJECTION INTRAVENOUS at 23:40

## 2022-10-04 RX ADMIN — POTASSIUM CHLORIDE 20 MEQ: 29.8 INJECTION, SOLUTION INTRAVENOUS at 08:37

## 2022-10-04 RX ADMIN — MIDODRINE HYDROCHLORIDE 20 MG: 5 TABLET ORAL at 06:53

## 2022-10-04 RX ADMIN — MAGNESIUM SULFATE HEPTAHYDRATE 2 G: 40 INJECTION, SOLUTION INTRAVENOUS at 09:16

## 2022-10-04 RX ADMIN — MIDODRINE HYDROCHLORIDE 20 MG: 5 TABLET ORAL at 13:57

## 2022-10-04 RX ADMIN — ONDANSETRON 4 MG: 2 INJECTION INTRAMUSCULAR; INTRAVENOUS at 16:57

## 2022-10-04 RX ADMIN — SENNOSIDES AND DOCUSATE SODIUM 1 TABLET: 50; 8.6 TABLET ORAL at 21:07

## 2022-10-04 RX ADMIN — ACETAMINOPHEN 650 MG: 325 TABLET, FILM COATED ORAL at 22:08

## 2022-10-04 NOTE — PROGRESS NOTES
[facility] in [address] [place], [place]  Inpatient Progress Note      Patient name: Jayjay Akins  Patient : 1945  Patient MRN: [id number]  Consulting MD: Isela Barrera MD  Date of service: 10/04/22    REASON FOR REFERRAL:  Management of cardiogenic shock     PLAN OF CARE:  66 y/o with h/o new diagnosis of dilated cardiomyopathy, LVEF 10-15%, stage C, NYHA class IIIA presents with VF cardiac arrest c/b acute renal and hepatic failure now extubated, AAOx4 and conversant thus unlikely significant anoxic brain injury  Weaning dobutamine gtt with stable hemodynamics; hopefully LVEF and hemodynamics improve to where she could be weaned off; otherwise poor candidate for home inotropes  Timing of LHC/RHC per primary team; will obtain non-invasive NICOM today; F team preference, if possible, to do RHC when patient off inotropes     RECOMMENDATIONS:  Decrease dobutamine 2.5 mcg/kg/min; check NICOM on lower dose; plans to wean as tolerated  Decrease midodrine 10mg PO TID  Cannot tolerate beta-blockers due to hypotension and dobutamine gtt  Cannot tolerate ARNi/ACEi/ARB due to SOCRATES  Start spironolactone 12.5mg daily, first dose today  Cannot tolerate SGLT2i inhibitor due to UTI  Start high dose vitamin D weekly x 12 weeks  No diuretics prescribed; appears euvolemic  Not on allopurinol or uloric, uric acid 3.1  Discontinued heparin due to thrombocytopenia; PVD prophylaxis per primary team  Not on aspirin  Not on statins  Will need IP evaluation for JOHANA  Complete ACP; uncertain if patient wishes ICD   Add HF screening labs: gammopathy profile, hepatitis profile, KENNY pending  Consider genetic testing before discharge  Nutritionist consult and heart failure education when patient recovers   Recommend flu, covid and pneumonia vaccinations at discharge    Remainder of care per primary team     IMPRESSION:  Sinus bradycardia, resolved  Acute on chronic systolic heart failure  Stage C, NYHA class IV symptoms  Dilated cardiomyopathy, LVEF 10-15%  C/b VF cardiac arrest  C/b cardiogenic shock  C/b renal and hepatic failure  Cardiac risk factors:  HTN  Leukocytosis, improved  Likely UTI (culture not obtained)  Neurocognitive dysfunction  Alzheimer disease  Cannot drive  Memory loss  Vitamin D deficiency     LIFE GOALS:  Lifestyle goals reviewed with the patient. Patient's personal goals include: TBD  Important upcoming milestones or family events: TBD  The patient identifies the following as important for living well: TBD     INTERVAL HISTORY:  No complaints  Afebrile  /40, HR 80-100s SR  Weight 110lbs  I/O net negative 800cc, urine 1.7 liters  Dobutamine 2.5  WBC 7.1 from 14.2 from 15.2  HGB 9 from 11.7  PLT 39 from 66 from 132  UA with yeast and WBC 10-20  aPTT 68  K 3.9  Mg 1.9  Cr 0.98 from 1.4 from 1.68  TBili 1.6 from 1.9 from 2.6  Lactic 1.8 from 2.3  proNTBNP 73299 from 64386 from 1265! HISTORY OF PRESENT ILLNESS:  Brenda Rubio is a 65 y.o. female with newly diagnosed few weeks ago severe cardiomyopathy LVEF 25-30% was awaiting CT angiogram had cardiac arrest and collapsed at dinner table. CPR was initiated by her  right away and then paramedic within 7 minutes. She was transferred to [facility] [place] initiated on code ice protocol. She was started on diuretics due to significant dyspnea, family described NYHA class IIIB symptoms, which she took for 7 days. CARDIAC IMAGING:  Echo (10/2/22)    Left Ventricle: Severely reduced left ventricular systolic function with a visually estimated EF of 10 -15%. Left ventricle is severely dilated. Normal wall thickness. Severe global hypokinesis present. Right Ventricle: Moderately reduced systolic function. Left Atrium: Left atrium is mildly dilated. Right Atrium: Right atrium is dilated. Technical qualifiers: Echo study was technically difficult.   EKG (10/3/22) NSR, septal infarct  LHC not done HEMODYNAMICS:  RHC not done  CPEST not done  6MW not done    PHYSICAL EXAM:  Visit Vitals  BP (!) 112/48   Pulse (!) 101   Temp 98.2 °F (36.8 °C)   Resp 16   Ht 5' 2.99\" (1.6 m)   Wt 110 lb 14.3 oz (50.3 kg)   SpO2 95%   BMI 19.65 kg/m²     General: Patient is well developed, well-nourished in no acute distress  HEENT: Unremarkable   Neck: Supple. No evidence of thyroid enlargements or lymphadenopathy. JVD: Cannot be appreciated   Lungs: Breath sounds are equal and clear bilaterally. No wheezes, rhonchi, or rales. Heart: Regular rate and rhythm with normal S1 and S2. No murmurs, gallops or rubs. Abdomen: Soft, no mass or tenderness. No organomegaly or hernia. Bowel sounds present. Genitourinary and rectal: deferred  Extremities: No cyanosis, clubbing, or edema. Neurologic: No focal sensory or motor deficits are noted. Grossly intact. Psychiatric: Awake, alert an doriented x 3. Appropriate mood and affect. Skin: Warm, dry and well perfused. REVIEW OF SYSTEMS:  General: Denies fever. Ear, nose and throat: Denies difficulty hearing, sinus problems, nosebleeds  Cardiovascular: see above in the interval history  Respiratory: Denies cough, wheezing, sputum production, hemoptysis.   Gastrointestinal: Denies heartburn, constipation, diarrhea, abdominal pain, nausea, blood in stool  Kidney and bladder: Denies painful urination, frequent urination  Musculoskeletal: Denies joint pain, muscle weakness  Skin and hair: Denies change in existing skin lesions    PAST MEDICAL HISTORY:  Past Medical History:   Diagnosis Date    Arthritis     Chronic pain     \"my right side has been hurting for 3-4 months\"    Hypertension        PAST SURGICAL HISTORY:  Past Surgical History:   Procedure Laterality Date    HX BREAST BIOPSY Bilateral     neg    HX BREAST REDUCTION Bilateral 2000    HX GI  2004    colon resection after perforation during ovarian cyst removal    HX HEENT      skin cyst removed from neck    HX HEENT uvuloplasty    HX HYSTERECTOMY  1982    and bladder repair    HX SHOULDER ARTHROSCOPY      right    HX UROLOGICAL      bladder repair during hysterectomy       FAMILY HISTORY:  No family history on file. SOCIAL HISTORY:  Social History     Socioeconomic History    Marital status:    Tobacco Use    Smoking status: Former     Packs/day: 0.25     Types: Cigarettes     Quit date:      Years since quittin.7    Smokeless tobacco: Never   Substance and Sexual Activity    Alcohol use: No    Drug use: No       LABORATORY RESULTS:     Labs Latest Ref Rng & Units 10/4/2022 10/3/2022 10/2/2022 10/2/2022 10/1/2022   WBC 3.6 - 11.0 K/uL 7.1 14. 2(H) - 15. 2(H) 6.5   RBC 3.80 - 5.20 M/uL 3.61(L) 4.86 - 5.04 5.22(H)   Hemoglobin 11.5 - 16.0 g/dL 9. 0(L) 11.7 - 12.3 12.8   Hematocrit 35.0 - 47.0 % 28. 4(L) 37.6 - 39.0 41.8   MCV 80.0 - 99.0 FL 78. 7(L) 77. 4(L) - 77. 4(L) 80.1   Platelets 427 - 433 K/uL 39(LL) 66(L) - 132(L) 131(L)   Lymphocytes % PENDING 8(L) - 4(L) 37   Monocytes % PENDING 6 - 5 4(L)   Eosinophils % PENDING 0 - 0 0   Basophils % PENDING 0 - 0 0   Bands 0 - 6 % - - - - 1   Albumin 3.5 - 5.0 g/dL 2. 8(L) 3. 3(L) - 2. 9(L) 2. 8(L)   Calcium 8.5 - 10.1 MG/DL 8.4(L) 8.6 8.4(L) 8.2(L) 8.4(L)   Glucose 65 - 100 mg/dL 92 161(H) 229(H) 249(H) 208(H)   BUN 6 - 20 MG/DL 26(H) 34(H) 41(H) 44(H) 44(H)   Creatinine 0.55 - 1.02 MG/DL 0.98 1.41(H) 1.68(H) 1.67(H) 1.84(H)   Sodium 136 - 145 mmol/L 133(L) 134(L) 134(L) 134(L) 134(L)   Potassium 3.5 - 5.1 mmol/L 3.9 3.5 4.4 2.5(LL) 3. 0(L)   TSH 0.36 - 3.74 uIU/mL 0.41 - - - -   Some recent data might be hidden     Lab Results   Component Value Date/Time    TSH 0.41 10/04/2022 06:18 AM       ALLERGY:  Allergies   Allergen Reactions    Iron Other (comments)     IV IRON only gave her convulsions        CURRENT MEDICATIONS:    Current Facility-Administered Medications:     magnesium sulfate 2 g/50 ml IVPB (premix or compounded), 2 g, IntraVENous, Q1H, Carla Shetty MD, Last Rate: 25 mL/hr at 10/04/22 0814, 2 g at 10/04/22 0814    potassium chloride 20 mEq in 50 ml IVPB, 20 mEq, IntraVENous, ONCE, Alfonso Broussard MD    cefTRIAXone (ROCEPHIN) 1 g in 0.9% sodium chloride 10 mL IV syringe, 1 g, IntraVENous, Q24H, Alfonso Broussard MD, 1 g at 10/04/22 0814    midodrine (PROAMATINE) tablet 10 mg, 10 mg, Oral, Q8H, Alfonso Broussard MD    therapeutic multivitamin (THERAGRAN) tablet 1 Tablet, 1 Tablet, Oral, DAILY, Mukesh HATCH MD, 1 Tablet at 10/04/22 0814    DOBUTamine (DOBUTREX) 500 mg/250 mL (2,000 mcg/mL) infusion, 2.5 mcg/kg/min, IntraVENous, CONTINUOUS, Alfonso Broussard MD, Last Rate: 3.7 mL/hr at 10/04/22 0819, 2.5 mcg/kg/min at 10/04/22 0819    alteplase (CATHFLO) 1 mg in sterile water (preservative free) 1 mL injection, 1 mg, InterCATHeter, PRN, Mukesh HATCH MD    vasopressin (VASOSTRICT) 20 Units in 0.9% sodium chloride 100 mL infusion, 0.04 Units/min, IntraVENous, CONTINUOUS, Alfonso Broussard MD, Stopped at 10/04/22 0542    dexmedeTOMidine in 0.9 % NaCl (PRECEDEX) 400 mcg/100 mL (4 mcg/mL) infusion soln, 0.1-1.5 mcg/kg/hr, IntraVENous, TITRATE, Mukesh HATCH MD, Stopped at 10/03/22 1245    potassium chloride SR (KLOR-CON 10) tablet 20 mEq, 20 mEq, Oral, DAILY, Kulwinder [place] MD, 20 mEq at 10/04/22 0814    HYDROmorphone (DILAUDID) injection 0.5 mg, 0.5 mg, IntraVENous, Q3H PRN, Alfonso Garsia MD    sodium chloride (NS) flush 5-40 mL, 5-40 mL, IntraVENous, Q8H, Jarod Liu G, DO, 10 mL at 10/04/22 0600    sodium chloride (NS) flush 5-40 mL, 5-40 mL, IntraVENous, PRN, Dirk Lau, DO    acetaminophen (TYLENOL) tablet 650 mg, 650 mg, Oral, Q6H PRN, 650 mg at 10/03/22 1639 **OR** acetaminophen (TYLENOL) suppository 650 mg, 650 mg, Rectal, Q6H PRN, Dirk Lau,     polyethylene glycol (MIRALAX) packet 17 g, 17 g, Oral, DAILY, Dirk Lau, , 17 g at 10/03/22 0939    senna-docusate (PERICOLACE) 8.6-50 mg per tablet 1 Tablet, 1 Tablet, Oral, BID, Anival Hones, DO, 1 Tablet at 10/04/22 8983    magnesium hydroxide (MILK OF MAGNESIA) 400 mg/5 mL oral suspension 30 mL, 30 mL, Oral, DAILY PRN, Anival Hones, DO    sodium phosphate (FLEET'S) enema 1 Enema, 1 Enema, Rectal, DAILY PRN, Anival Hones, DO    ondansetron (ZOFRAN) injection 4 mg, 4 mg, IntraVENous, Q6H PRN, Anival Hones, DO    chlorhexidine (PERIDEX) 0.12 % mouthwash 15 mL, 15 mL, Oral, Q12H, Anivaleugenia Keys, DO, 15 mL at 10/03/22 8305    PATIENT CARE TEAM:  Patient Care Team:  Leah Castillo MD as PCP - General (Family Medicine)  Leah Castillo MD as PCP - [address] [place] [address] Provider     Thank you for allowing me to participate in this patient's care.     Mirella Sotomayor MD   [address] [place] [address], [address]  Phone: [phone]

## 2022-10-04 NOTE — PROGRESS NOTES
Patient/family seen: YES       Informed patient/family of BPCI-A Bundle Program. Also advised of potential outreach by Care Transitions Team.    Bundle Payment Care Improvement Beneficiary Letter Delivered to Beneficiary or Representative:YES.  Date BCPI -A was given 10/04/22

## 2022-10-04 NOTE — PROGRESS NOTES
JOHN: Anticipate discharge home with [facility] PT/OT pending medical progress. Transportation likely in car with /daughter. RUR: 16%    Emergency contact: Elise Velez, spouse, [phone]    Disposition: Patient admitted 10/2 for cardiac arrest. Patient is from home where she lives with her .  is primary caregiver. Patient does have a history of dementia. Patient has been extubated and passed swallow test. [facility] PT/OT have been recommended. CM discussed additional resources possibly needed by family to include DME, respite care and caregiver support. Family has no preference for [facility] providers. Referrals submitted via [address] Patient is scheduled for a cardiac catheterization on Thursday. Life vest and defibrillator have been recommended. Family has valid concerns regarding life vest due to patient's dementia history. CM will continue to follow for discharge needs. Transition of Care Plan:     The Plan for Transition of Care is related to the following treatment goals: [facility] PT/OT    The Patient and/or patient representative, Elise Velez, was provided with a choice of provider and agrees  with the discharge plan. Yes [x] No []    A Freedom of choice list was provided with basic dialogue that supports the patient's individualized plan of care/goals and shares the quality data associated with the providers.        Yes [x] No []     Nathaniel Gilbert, [address] [place] [address],[address]  [phone]

## 2022-10-04 NOTE — DISCHARGE INSTRUCTIONS
Pacemaker and ICD incision care and Discharge Instructions    Limitations and Precautions    Do not life the affected arm over your head for the next 2 weeks. If you are given a sling, only use it for 2-3 days. The sling is just a comfort measure and a remind to not lift your arm. Do not lift anything heavy for 2 weeks. Restriction of 10 lbs total weight. For example 1 six pack of 12 oz soda weighs 4 lbs. 1 gallon of milk weighs 8.5 lbs. Some swelling, redness, and pain are common with all incisions and normally will go away as the incision heals. If swelling, redness, or pain increases or if the area feels warm to touch contact a doctor. Also contact a doctor if the incision edges reopen or separate, if any drainage is noted or if you have a high fever , > 101 degrees. Caring for your Incision    Surgical Glue was used over your incision  If a small dressing is over your incision, you may remove it one day after your procedure. Surgical glue has been applied directly on the incision. This will be shiny in appearance. 24 hours after your surgery, you may briefly wet your incision in the shower or bath. Do not soak or scrub your incision. After showering or bathing, gently blot your wound dry with a soft towel. Do not scratch, rub, or pick at the adhesive film. This may loosen the film before your incision has healed. Protect the incision from prolonged exposure to sunlight or tanning lamps while the film in place. Do not apply liquid or ointment medications or any other product to your incision while the adhesive film is in place. These may loosen the film before your wound is healed. The adhesive film will start to flake off in a week or two. Information about your Pacemaker/ICD    Use with Caution:  You may safely use a cell phone on the side opposite from your device. Keep a cell phone 6 inches from your device.   Keep any small electrical devices such as an I-pod 6 inches from your device  You CAN HAVE A MRI SCAN  Do not use a TENS unit or magnetic therapy application near your device. Check with your nurse to be approved. Use of lawn equipment or power tools; Keep all equipment at arms length. Other Information:  Keep your ID card with you at all times. You may use a microwave oven and any other household appliances. You may walk through a metal detector. It will not harm your device; however, it may cause the security arm to sound  You may have an X-ray or CAT Scan. If you undergo any type of procedure or surgery, notify the physician of your device. It may need to be adjusted prior to the procedure. We recommend purchasing a Medic Alert bracelet or necklace    If you have further questions you may call your physician's office                 Download the Heart Failure Douglas Wendy: Search in your Google Play Store (AndOxtex) or [employer] Wendy Store (Solid Sound): Search for- HF Douglas Wendy.    Submitnet    HF Douglas is a brand-new phone wendy that helps you track daily symptoms, vitals, mood, energy level and more. You can even add your heart failure care team members to view your data and monitor your condition at home. HF Douglas lets you:  Track symptoms, medications and more  Share health information with your health care team  Connect with others living with heart failure          Discharge Instructions       PATIENT ID: Viet Lizarraga  MRN: [id number]   YOB: 1945    DATE OF ADMISSION: 10/02/2022  DATE OF DISCHARGE: 10/13/2022    PRIMARY CARE PROVIDER:  Lindi Jeans, MD    ATTENDING PHYSICIAN: Alcira Chong MD   DISCHARGING PROVIDER: Dante Miranda MD    To contact this individual call [phone] and ask the  to page. If unavailable ask to be transferred the Adult Hospitalist Department.     DISCHARGE DIAGNOSES    Cardiac arrest prior to admission due to V Fib with CHF based on workup this admission  Acute HFrEF,  EF 10-15%, Stage C, NYHA Class III A  Acute respiratory failure  Orthostatic hypotension   Anemia of chronic disease  Creatinine was mildly elevated post procedures and heart cath   Thrombocytopenia  UTI POA  Hx of dementia  Hypomagnesemia   Left sided rib cage pain suspected costochrondritis from CPR    CONSULTATIONS:   -Intensivist  -Advanced Heart failure team  -Cardiologist  -Palliative Care Team    PROCEDURES/SURGERIES: Procedure(s):  INSERT ICD SINGLE    PENDING TEST RESULTS:   At the time of discharge the following test results are still pending: None     FOLLOW UP APPOINTMENTS:   Estefani Eduardo MD, [facility], in one week   Danuta Parham MD, Primary Care Physician in one week  Clark Pop MD, Cardiology in one week        ADDITIONAL CARE RECOMMENDATIONS:      DIET: Cardiac Diet      ACTIVITY: Activity as tolerated    WOUND CARE: None    EQUIPMENT needed: Walker    DISCHARGE MEDICATIONS:   See Medication Reconciliation Form    It is important that you take the medication exactly as they are prescribed. Keep your medication in the bottles provided by the pharmacist and keep a list of the medication names, dosages, and times to be taken in your wallet. Do not take other medications without consulting your doctor. NOTIFY YOUR PHYSICIAN FOR ANY OF THE FOLLOWING:   Fever over 101 degrees for 24 hours. Chest pain, shortness of breath, fever, chills, nausea, vomiting, diarrhea, change in mentation, falling, weakness, bleeding. Severe pain or pain not relieved by medications. Or, any other signs or symptoms that you may have questions about.       DISPOSITION:   x Home With:  x OT x PT x HH  RN       SNF/Inpatient Rehab/LTAC    Independent/assisted living    Hospice    Other:     CDMP Checked:   Yes x     PROBLEM LIST Updated:  Yes x       Signed:   Antelmo Haley MD  10/13/2022  9:27 AM

## 2022-10-04 NOTE — NURSE NAVIGATOR
HEART FAILURE NURSE NAVIGATOR NOTE  [address]    Patient chart was reviewed by Heart Failure (HF) Nurse Navigators for compliance of prescribed treatment with guidelines directed medical therapy (GDMT) and HF database completed. Please, review beneath recommendations for symptomatic patients with HF with Reduced Ejection Fraction ? 40% (HFrEF) for your consideration when taking care of this patient. Current Medical Therapy:    Name Fritz Lanes DOB 1945   LVEF 10/15%   NYHA Functional Class IV   ARNi/ACEi/ARB Contraindicated SOCRATES   Beta-blocker Contraindicated hypotension/dobutamine   Aldosterone Antagonist Spironolactone   SGLT2 inhibitor Contraindicated UTI   Hydralazine/Isosorbide Dinitrate    Consulting Cardiologist: [facility]     Recommendations:    Please, add the following GDMT for HFrEF ? 40% [Class 1] or document in discharge summary/progress note why patient cannot take the medication:  ARNi/ACEi or ARB  Beta-blockers (carvedilol, sustained-release metoprolol succinate or bisoprolol)  Aldosterone antagonists GFR > 30 and K< 5  SGLT2 inhibitor  Hydralazine/Isosorbide dinitrate for  Americans with Class III/IV symptoms  Adjust diuretic dose at discharge if hospitalized for volume overload    Consider adding the following GDMT for HFrEF ? 40%, if appropriate [Class 2b]:   Ivabradine for patients on maximally tolerated beta-blocker dose in order to achieve HR 70-80bpm  Digoxin, goal level 0.5-0.9  Polyunsaturated fatty acids  Vericuguat  For patient with hyperkalemia while on RAASi > 5.5, consider adding potassium binders (patiromer, sodium zirconium cycosilicate)    Note: the following medications may be potentially harmful in heart failure [Class 3]:  Calcium channel blockers (doxazosin, diltiazem, verapamil, nifedipine)  Antiarrhythmics (flecanide, disopyrimide, sotalol, dronedarone)  Diabetes medications (thiasolidinediones, saxagliptin, alogliptin)  NSAIDs and SHANE 2 inhibitors    Consider vaccinations for respiratory illnesses (flu, pneumonia, covid) [Class 2b]    For eligible patients with LVEF < 35% consider discharge with wearable defibrillation [Class 2b] and/or referral for ICD implantation [Class 1] for prevention of sudden cardiac death. Patient Education:     Teach back in heart failure education provided, including information about medical therapy, lifestyle modifications, diet and fluid restrictions, physical activity. Educational resources provided: Living with Heart Failure booklet; Signs/Symptoms magnet; Weight Calendar; Dispatch Health flyer; Preparation for Successful Discharge Checklist.  Information provided about HF support group. Heart failure avoiding triggers on discharge instructions. Plan for Transitional Care:    Post discharge follow up phone call to be made within 48-72 hours of discharge. Patient will follow-up with PCP. Patient will follow-up with Primary Cardiologist.  Obstructive sleep apnea screening done and patient was referred to Sleep Medicine. Referral/follow-up with Cardiac Rehabilitation. Referral/follow-up with Advanced Heart Failure Specialist.  Referral/follow-up with Palliative Care Specialist.      Heart Failure Nurse Navigator  Phone: [phone]  /  [phone]    *Recommendations listed above are based on 2022 AHA/ACC/HFSA Guideline for the Management of Heart Failure: A Report of the [address] on Clinical Practice Guidelines. Circulation 2022; [id number]. and 2017 AHA/ACC/HRS guideline for management of patients with ventricular arrhythmias and the prevention of sudden cardiac death: A Report of the [employer] of Cardiology/American Heart Association Task Force on Clinical Practice Guidelines and the Heart Rhythm Society.  Heart Rhythm, Vol 15, No 10, October 2018 *Class of Recommendation: Class 1 (strong), Class 2a (moderate), Class 2b (weak), Class 3 (not recommended, potentially harmful)

## 2022-10-04 NOTE — CONSULTS
ELECTROPHYSIOLOGY CONSULT                 Assessment:     Assessment:       Active Problems:    Cardiac arrest ([address] (10/1/2022)      Acute systolic heart failure ([address] (10/4/2022)         Plan:    SCD:   sp cardiac arrest:   still may be too soon to discus ICD as secondary prophylaxis as still requiring inotropic rx and \" not good long term home inotropic rx candidate\"   Rec cardiac cath to r/o secondary cause of scd  Acute on chronic systolic chf  dobutamine requiring   CM EF prviously 9/2022  25%  now 10-15%   Hx htn  Dementia  Thrombocytopenia  plt 39K  . Heparin held         Subjective:    Date of  Admission: 10/2/2022  2:33 AM     Admission type:Urgent    Pamela Helton is a 65 y.o. female admitted for Cardiac arrest ([facility] [I46.9]. J admitted for Cardiac arrest ([facility] [address] [I46.9]. Patient just collapsed at dinner table. CPR was initiated by her  right away and then paramedic within 7 minutes. She is transferred to here for further evaluation. She did grab nurse's hand, not on command and has some inappropriate response. Decided not to place her on code ice due to her brain issue. She was seen by Dr. Preston Kat two week sago for abnormal ekg and CHF sx. Echo showed EF 25 to 30% and she was waiting for CTA of heart.     Patient Active Problem List    Diagnosis Date Noted    Acute systolic heart failure ([address] 10/04/2022    Cardiac arrest ([facility] [address] 10/01/2022      Dereck Aase, MD  Past Medical History:   Diagnosis Date    Arthritis     Chronic pain     \"my right side has been hurting for 3-4 months\"    Hypertension       Past Surgical History:   Procedure Laterality Date    HX BREAST BIOPSY Bilateral     neg    HX BREAST REDUCTION Bilateral 2000    HX GI  2004    colon resection after perforation during ovarian cyst removal    HX HEENT      skin cyst removed from neck    HX HEENT      uvuloplasty    HX HYSTERECTOMY  1982    and bladder repair    HX SHOULDER ARTHROSCOPY      right    HX UROLOGICAL      bladder repair during hysterectomy     Allergies   Allergen Reactions    Iron Other (comments)     IV IRON only gave her convulsions      No family history on file.    Current Facility-Administered Medications   Medication Dose Route Frequency    cefTRIAXone (ROCEPHIN) 1 g in 0.9% sodium chloride 10 mL IV syringe  1 g IntraVENous Q24H    therapeutic multivitamin (THERAGRAN) tablet 1 Tablet  1 Tablet Oral DAILY    ergocalciferol capsule 50,000 Units  50,000 Units Oral Q7D    midodrine (PROAMATINE) tablet 20 mg  20 mg Oral Q8H    DOBUTamine (DOBUTREX) 500 mg/250 mL (2,000 mcg/mL) infusion  2.5 mcg/kg/min IntraVENous CONTINUOUS    alteplase (CATHFLO) 1 mg in sterile water (preservative free) 1 mL injection  1 mg InterCATHeter PRN    vasopressin (VASOSTRICT) 20 Units in 0.9% sodium chloride 100 mL infusion  0.04 Units/min IntraVENous CONTINUOUS    potassium chloride SR (KLOR-CON 10) tablet 20 mEq  20 mEq Oral DAILY    HYDROmorphone (DILAUDID) injection 0.5 mg  0.5 mg IntraVENous Q3H PRN    sodium chloride (NS) flush 5-40 mL  5-40 mL IntraVENous Q8H    sodium chloride (NS) flush 5-40 mL  5-40 mL IntraVENous PRN    acetaminophen (TYLENOL) tablet 650 mg  650 mg Oral Q6H PRN    Or    acetaminophen (TYLENOL) suppository 650 mg  650 mg Rectal Q6H PRN    polyethylene glycol (MIRALAX) packet 17 g  17 g Oral DAILY    senna-docusate (PERICOLACE) 8.6-50 mg per tablet 1 Tablet  1 Tablet Oral BID    magnesium hydroxide (MILK OF MAGNESIA) 400 mg/5 mL oral suspension 30 mL  30 mL Oral DAILY PRN    sodium phosphate (FLEET'S) enema 1 Enema  1 Enema Rectal DAILY PRN    ondansetron (ZOFRAN) injection 4 mg  4 mg IntraVENous Q6H PRN    chlorhexidine (PERIDEX) 0.12 % mouthwash 15 mL  15 mL Oral Q12H      Shx:    previous school admin   Marital status:     Tobacco Use    Smoking status: Former       Packs/day: 0.25       Types: Cigarettes       Quit date:        Years since quittin.7    Smokeless tobacco: Never   Substance and Sexual Activity    Alcohol use: No    Drug use: No     Fmhx:  no fmhx of scd or cardiomyopathy in 1st or second generation relatives. Review of Symptoms:  A comprehensive review of systems was negative. No hemoptysis, hematemesis, epistaxis, melena, hematuria. No fevers,  Rashes, seizures, visual disturbances, difficulty walking, no abdominal pain         Physical Exam    Visit Vitals  /60   Pulse 71   Temp 97.7 °F (36.5 °C)   Resp 19   Ht 5' 2.99\" (1.6 m)   Wt 50.3 kg (110 lb 14.3 oz)   SpO2 100%   BMI 19.65 kg/m²     Skin warm and dry  PERRLA, EOMI  Oropharynx without exudate. Mallampati 2  Neck supple, thyroid not enlarged  Lungs clear  PMI non displaced. Normal S1/ S2   No Mummurs, click or Rubs  No S3 or S4  Abdomen soft and non tender, No Hepatosplenomegaly  Pulses 2+ throughout,   Neuro:   normal facial grimace,  Moves all extremities. AAAO  unanxious      Cardiographics      ECG: normal sinus rhythm    short pr    nstt  long qt.    Echocardiogram: Abnormal, and reviewed by myself:  severe dilated cm  ef 10%     Labs:   Recent Results (from the past 24 hour(s))   GLUCOSE, POC    Collection Time: 10/03/22  2:10 PM   Result Value Ref Range    Glucose (POC) 126 (H) 65 - 117 mg/dL    Performed by Janessa Bryant    PTT    Collection Time: 10/03/22  4:20 PM   Result Value Ref Range    aPTT 52.8 (H) 22.1 - 31.0 sec    aPTT, therapeutic range     58.0 - 77.0 SECS   PTT    Collection Time: 10/04/22 12:16 AM   Result Value Ref Range    aPTT 70.4 (H) 22.1 - 31.0 sec    aPTT, therapeutic range     58.0 - 08.4 SECS   METABOLIC PANEL, COMPREHENSIVE    Collection Time: 10/04/22  6:14 AM   Result Value Ref Range    Sodium 133 (L) 136 - 145 mmol/L    Potassium 3.9 3.5 - 5.1 mmol/L    Chloride 102 97 - 108 mmol/L    CO2 25 21 - 32 mmol/L    Anion gap 6 5 - 15 mmol/L    Glucose 92 65 - 100 mg/dL    BUN 26 (H) 6 - 20 MG/DL    Creatinine 0.98 0.55 - 1.02 MG/DL    BUN/Creatinine ratio 27 (H) 12 - 20      eGFR 59 (L) >60 ml/min/1.73m2    Calcium 8.4 (L) 8.5 - 10.1 MG/DL    Bilirubin, total 1.6 (H) 0.2 - 1.0 MG/DL    ALT (SGPT) 60 12 - 78 U/L    AST (SGOT) 31 15 - 37 U/L    Alk. phosphatase 78 45 - 117 U/L    Protein, total 4.6 (L) 6.4 - 8.2 g/dL    Albumin 2.8 (L) 3.5 - 5.0 g/dL    Globulin 1.8 (L) 2.0 - 4.0 g/dL    A-G Ratio 1.6 1.1 - 2.2     MAGNESIUM    Collection Time: 10/04/22  6:14 AM   Result Value Ref Range    Magnesium 1.6 1.6 - 2.4 mg/dL   PHOSPHORUS    Collection Time: 10/04/22  6:14 AM   Result Value Ref Range    Phosphorus 1.9 (L) 2.6 - 4.7 MG/DL   CBC WITH AUTOMATED DIFF    Collection Time: 10/04/22  6:14 AM   Result Value Ref Range    WBC 7.1 3.6 - 11.0 K/uL    RBC 3.61 (L) 3.80 - 5.20 M/uL    HGB 9.0 (L) 11.5 - 16.0 g/dL    HCT 28.4 (L) 35.0 - 47.0 %    MCV 78.7 (L) 80.0 - 99.0 FL    MCH 24.9 (L) 26.0 - 34.0 PG    MCHC 31.7 30.0 - 36.5 g/dL    RDW 12.8 11.5 - 14.5 %    PLATELET 39 (LL) 125 - 400 K/uL    NRBC 0.0 0  WBC    ABSOLUTE NRBC 0.00 0.00 - 0.01 K/uL    NEUTROPHILS 78 (H) 32 - 75 %    LYMPHOCYTES 9 (L) 12 - 49 %    MONOCYTES 10 5 - 13 %    EOSINOPHILS 2 0 - 7 %    BASOPHILS 0 0 - 1 %    IMMATURE GRANULOCYTES 1 (H) 0.0 - 0.5 %    ABS. NEUTROPHILS 5.6 1.8 - 8.0 K/UL    ABS. LYMPHOCYTES 0.6 (L) 0.8 - 3.5 K/UL    ABS. MONOCYTES 0.7 0.0 - 1.0 K/UL    ABS. EOSINOPHILS 0.1 0.0 - 0.4 K/UL    ABS. BASOPHILS 0.0 0.0 - 0.1 K/UL    ABS. IMM.  GRANS. 0.1 (H) 0.00 - 0.04 K/UL    DF SMEAR SCANNED      RBC COMMENTS ANISOCYTOSIS  1+        RBC COMMENTS HYPOCHROMIA  1+       PROTHROMBIN TIME + INR    Collection Time: 10/04/22  6:14 AM   Result Value Ref Range    INR 1.2 (H) 0.9 - 1.1      Prothrombin time 12.4 (H) 9.0 - 11.1 sec   PTT    Collection Time: 10/04/22  6:18 AM   Result Value Ref Range    aPTT 68.0 (H) 22.1 - 31.0 sec    aPTT, therapeutic range     58.0 - 77.0 SECS   NT-PRO BNP    Collection Time: 10/04/22  6:18 AM   Result Value Ref Range    NT pro-BNP 30,592 (H) <450 PG/ML   C REACTIVE PROTEIN, QT Collection Time: 10/04/22  6:18 AM   Result Value Ref Range    C-Reactive protein 4.95 (H) 0.00 - 0.60 mg/dL   PROCALCITONIN    Collection Time: 10/04/22  6:18 AM   Result Value Ref Range    Procalcitonin 1.58 ng/mL   SED RATE (ESR)    Collection Time: 10/04/22  6:18 AM   Result Value Ref Range    Sed rate, automated 3 0 - 30 mm/hr   IRON PROFILE    Collection Time: 10/04/22  6:18 AM   Result Value Ref Range    Iron 37 35 - 150 ug/dL    TIBC 164 (L) 250 - 450 ug/dL    Iron % saturation 23 20 - 50 %   FERRITIN    Collection Time: 10/04/22  6:18 AM   Result Value Ref Range    Ferritin 322 26 - 388 NG/ML   TSH 3RD GENERATION    Collection Time: 10/04/22  6:18 AM   Result Value Ref Range    TSH 0.41 0.36 - 3.74 uIU/mL   CK    Collection Time: 10/04/22  6:18 AM   Result Value Ref Range     (H) 26 - 192 U/L   VITAMIN D, 25 HYDROXY    Collection Time: 10/04/22  6:18 AM   Result Value Ref Range    Vitamin D 25-Hydroxy 20.9 (L) 30 - 100 ng/mL   URIC ACID    Collection Time: 10/04/22  6:18 AM   Result Value Ref Range    Uric acid 3.5 2.6 - 6.0 MG/DL   CORTISOL    Collection Time: 10/04/22  6:18 AM   Result Value Ref Range    Cortisol, random 24.0 ug/dL   LACTIC ACID    Collection Time: 10/04/22  6:25 AM   Result Value Ref Range    Lactic acid 1.4 0.4 - 2.0 MMOL/L   AMMONIA    Collection Time: 10/04/22  6:25 AM   Result Value Ref Range    Ammonia, plasma 17 <32 UMOL/L

## 2022-10-04 NOTE — PROGRESS NOTES
Cardiology Progress Note  10/4/2022    Admit Date: 10/2/2022  Admit Diagnosis: Cardiac arrest ([facility] [address] [I46.9]  CC:                                                        Assessment:     Active Problems:    Cardiac arrest ([facility] [address] (10/1/2022)      Acute systolic heart failure ([facility] [address] (10/4/2022)        Plan:   Chart reviewed, patient examined and case discussed with the patient, her  and her daughter. CAD: Unknown. Cath later this week; Hs trop peaked at 1352    Cardiogenic shock: still pressor dependent     HF: EF 10% here  (personally reviewed0; recently noted to have low EF( 20-25%) on echo 9/9022 and was placed on  GDMT; NT pro BNP 30,592:  Dr Keyur Jo help greatly appreciated    HTN: well controlled    Rhythm: had QT prolongation and idioventricular rhythm, rate ~  110 bpm at the time of her arrest  Have requested an EP consult with Dr Gregor Aguilar    Renal function: stable    Thrombocytopenia: platelets 70I: heparin stopped and work up in progress      For all other plans, see orders.    [x]     High complexity decision making was performed    Subjective:  Patient reports  []   nothing; unable to communicate    []    intubated   Chest Pain:  [x]   none,  consistent with []   non-cardiac  []   atypical  []   angina             [x]     none now      []     on-going  Dyspnea: [x]   none   []   at rest   []   with exertion   []   improved   []   unchanged   []   worsening  PND:        [x]     none   []     overnight    Orthopnea:   [x]     none     []     improved     []     unchanged     []     worsening  Presyncope: [x]     none     []     improved     []     unchanged     []     worsening  Ambulated in hallway without symptoms  []     Yes  Ambulated in room without symptoms  []     Yes  ROS(2+other systems)   Hematuria: []   Yes      [x]   No.        Dysuria: []   Yes      [x]   No                                           Cough:       []   Yes      [x]   No.        Sputum: [] Yes      [x]   No                                            Hematochezia: []   Yes    [x]   No     Melena: []   Yes       [x]   No                                            No change in family and social history from H&P/Consult note. Objective:    Physical Exam:  24 hr VS reviewed, overall VSSAF  Temp (24hrs), Av.1 °F (36.7 °C), Min:97.3 °F (36.3 °C), Max:98.8 °F (37.1 °C)    Patient Vitals for the past 8 hrs:   Pulse   10/04/22 1122 77   10/04/22 1000 74   10/04/22 0900 80   10/04/22 0800 91   10/04/22 0700 (!) 101   10/04/22 0600 92   10/04/22 0500 96   10/04/22 0400 88    Patient Vitals for the past 8 hrs:   Resp   10/04/22 1122 15   10/04/22 1000 15   10/04/22 0900 14   10/04/22 0800 16   10/04/22 0700 16   10/04/22 0600 15   10/04/22 0500 16   10/04/22 0400 14    Patient Vitals for the past 8 hrs:   BP   10/04/22 1122 132/64   10/04/22 1000 (!) 120/55   10/04/22 0900 (!) 114/49   10/04/22 0800 (!) 108/47   10/04/22 0700 (!) 112/48   10/04/22 0600 (!) 117/46   10/04/22 0500 126/75   10/04/22 0400 (!) 117/51          Intake/Output Summary (Last 24 hours) at 10/4/2022 1126  Last data filed at 10/4/2022 0700  Gross per 24 hour   Intake 518.41 ml   Output 1630 ml   Net -1111.59 ml     General Appearance:   [x]     well developed, well nourished,      [x]     NAD. []     agitated      []     lethargic but arousable      []     obtunded   ENT, Palate:    [x]     WNL     []     dry palate/MM     [x]     anicteric     Respiratory:    [x] CTA bilateral   [] rales   [] rhonchi   [] normal resp effort      [] similar to yesterday    [] worse     [] improved   Cardiovascular:   [x]  RRR   []     Irregular rate and rhythm   [x]  Normal S1, S2   [x]     No gallop or rub.    [] no murmur   [x]     no new murmur  []   murmur c/w:   [x] no edema;     RLE:[]     1+    []     2+    []     3+;                              LLE:[]     1+    []      2+    []      3+      []   edema similar to yesterday     []   worse []   improved   [x]     normal JVP       []     Elevated JVP    [x]     JVP similar to yesterday     []   worse      []   improved   [x]     carotid upstroke unchanged   [x]     abd aorta not palpated   [x]     no stigmata of peripheral emboli   GI:    [x]     abd soft, non-distented,bowel sounds present, no                     organomegaly appreciated   Skin:  Neuro:      [x]     warm and dry      []     cold extremities   [x]  A/O x 3, grossly non-focal     [] Obtunded    [] sedated            Data Review:  Lab results reviewed as noted below. Current medications reviewed as noted below. Telemetry independently reviewed : [x] sinus    [] chronic afib     [] par afib    [] NSVT    ECG independently reviewed: [] NSR    [] no significant changes   [] no new ECG provided for review    Recent Labs     10/01/22  2213   PH 7.46*   PCO2 23*   PO2 480*     Recent Labs     10/04/22  0618   *     Recent Labs     10/04/22  0614 10/03/22  0447 10/02/22  1415 10/02/22  0653   * 134* 134* 134*   K 3.9 3.5 4.4 2.5*    104 103 103   CO2 25 22 24 21   BUN 26* 34* 41* 44*   CREA 0.98 1.41* 1.68* 1.67*   GLU 92 161* 229* 249*   PHOS 1.9* 2.7  --  4.7   CA 8.4* 8.6 8.4* 8.2*   ALB 2.8* 3.3*  --  2.9*   WBC 7.1 14.2*  --  15.2*   HGB 9.0* 11.7  --  12.3   HCT 28.4* 37.6  --  39.0   PLT 39* 66*  --  132*     Recent Labs     10/04/22  0614 10/03/22  0447 10/02/22  0653   ALT 60 97* 154*   AP 78 81 75   TBILI 1.6* 1.9* 2.6*   TP 4.6* 5.7* 5.4*   ALB 2.8* 3.3* 2.9*   GLOB 1.8* 2.4 2.5     Recent Labs     10/04/22  0618 10/04/22  0614 10/04/22  0016 10/03/22  1620 10/03/22  0935 10/03/22  0447   INR  --  1.2*  --   --   --  1.3*   PTP  --  12.4*  --   --   --  13.2*   APTT 68.0*  --  70.4* 52.8*   < >  --     < > = values in this interval not displayed.       Recent Labs     10/04/22  0618   TIBC 164*   PSAT 23   FERR 322      Lab Results   Component Value Date/Time    Glucose (POC) 126 (H) 10/03/2022 02:10 PM    Glucose (POC) 143 (H) 10/03/2022 04:20 AM    Glucose (POC) 192 (H) 10/02/2022 11:56 PM    Glucose (POC) 191 (H) 10/02/2022 06:36 PM    Glucose,  (H) 10/01/2022 09:18 PM       Current Facility-Administered Medications   Medication Dose Route Frequency    cefTRIAXone (ROCEPHIN) 1 g in 0.9% sodium chloride 10 mL IV syringe  1 g IntraVENous Q24H    therapeutic multivitamin (THERAGRAN) tablet 1 Tablet  1 Tablet Oral DAILY    ergocalciferol capsule 50,000 Units  50,000 Units Oral Q7D    midodrine (PROAMATINE) tablet 20 mg  20 mg Oral Q8H    DOBUTamine (DOBUTREX) 500 mg/250 mL (2,000 mcg/mL) infusion  2.5 mcg/kg/min IntraVENous CONTINUOUS    alteplase (CATHFLO) 1 mg in sterile water (preservative free) 1 mL injection  1 mg InterCATHeter PRN    vasopressin (VASOSTRICT) 20 Units in 0.9% sodium chloride 100 mL infusion  0.04 Units/min IntraVENous CONTINUOUS    potassium chloride SR (KLOR-CON 10) tablet 20 mEq  20 mEq Oral DAILY    HYDROmorphone (DILAUDID) injection 0.5 mg  0.5 mg IntraVENous Q3H PRN    sodium chloride (NS) flush 5-40 mL  5-40 mL IntraVENous Q8H    sodium chloride (NS) flush 5-40 mL  5-40 mL IntraVENous PRN    acetaminophen (TYLENOL) tablet 650 mg  650 mg Oral Q6H PRN    Or    acetaminophen (TYLENOL) suppository 650 mg  650 mg Rectal Q6H PRN    polyethylene glycol (MIRALAX) packet 17 g  17 g Oral DAILY    senna-docusate (PERICOLACE) 8.6-50 mg per tablet 1 Tablet  1 Tablet Oral BID    magnesium hydroxide (MILK OF MAGNESIA) 400 mg/5 mL oral suspension 30 mL  30 mL Oral DAILY PRN    sodium phosphate (FLEET'S) enema 1 Enema  1 Enema Rectal DAILY PRN    ondansetron (ZOFRAN) injection 4 mg  4 mg IntraVENous Q6H PRN    chlorhexidine (PERIDEX) 0.12 % mouthwash 15 mL  15 mL Oral Q12H         Felix Mccarty MD

## 2022-10-04 NOTE — PROGRESS NOTES
0730 Bedside and Verbal shift change report given to Arnel Camacho (oncoming nurse) by Lora Fontenot (offgoing nurse). Report included the following information SBAR, Kardex, Procedure Summary, Intake/Output, MAR, Accordion, and Recent Results.       1120 Nicom obtained    CI - 4.5  CO - 6.5  SVI - 56  TPR - 1256  TPRI - 1864

## 2022-10-04 NOTE — PROGRESS NOTES
Problem: Self Care Deficits Care Plan (Adult)  Goal: *Acute Goals and Plan of Care (Insert Text)  Description:   FUNCTIONAL STATUS PRIOR TO ADMISSION: Patient was independent and active without use of DME. Completed IADL independently with transportation assistance from family/. Baseline A&Ox3-4, occasional memory deficits. HOME SUPPORT: Lived with  in 2 story home, has supportive family nearby who assist with IADL. Occupational Therapy Goals  Initiated 10/4/2022  1. Patient will perform grooming with supervision/set-up within 7 day(s). 2.  Patient will perform upper body dressing with supervision/set-up within 7 day(s). 3.  Patient will perform lower body dressing with supervision/set-up within 7 day(s). 4.  Patient will perform all aspects of toileting with minimal assistance/contact guard assist within 7 day(s). 5.  Patient will utilize energy conservation techniques during functional activities with verbal cues within 7 day(s). Outcome: Not Met   OCCUPATIONAL THERAPY EVALUATION  Patient: Latricia Donnelly (40 y.o. female)  Date: 10/4/2022  Primary Diagnosis: Cardiac arrest ([facility] [I46.9]  Procedure(s) (LRB):  LEFT HEART CATH / CORONARY ANGIOGRAPHY (N/A)     Precautions:  Skin    ASSESSMENT  Based on the objective data described below, the patient presents with impaired strength and activity tolerance impacting ability to complete ADL/iADL at baseline. Patient received reclined in bed, amenable to session. Session cleared by RN and MD. VSS throughout session, no s/s orthostatic hypotension. Patient with fair strength, able to transfer with x1 assist. Anticipate steady gains and discharge home with [facility] and family assist. Patient left reclined in bed, all needs in reach, NAD. Current Level of Function Impacting Discharge (ADLs/self-care): up to min A ADL/mobility    Functional Outcome Measure:   The patient scored Total: 50/100 on the Barthel Index outcome measure which is indicative of 50% impaired ability to care for basic self needs/dependency on others; inferred 50% dependency on others for instrumental ADLs. Other factors to consider for discharge: below baseline, s/p cardiac arrest CPR,     Patient will benefit from skilled therapy intervention to address the above noted impairments. PLAN :  Recommendations and Planned Interventions: self care training, functional mobility training, therapeutic exercise, balance training, therapeutic activities, endurance activities, patient education, home safety training, and family training/education    Frequency/Duration: Patient will be followed by occupational therapy 4 times a week to address goals. Recommendation for discharge: (in order for the patient to meet his/her long term goals)  Occupational therapy at least 2 days/week in the home AND ensure assist and/or supervision for safety with ADL/IADL    This discharge recommendation:  Has been made in collaboration with the attending provider and/or case management    IF patient discharges home will need the following DME: TBD pending progress, likely none       SUBJECTIVE:   Patient stated I'm a little sore here.  re:sternal pain d/t CPR    OBJECTIVE DATA SUMMARY:   HISTORY:   Past Medical History:   Diagnosis Date    Arthritis     Chronic pain     \"my right side has been hurting for 3-4 months\"    Hypertension      Past Surgical History:   Procedure Laterality Date    HX BREAST BIOPSY Bilateral     neg    HX BREAST REDUCTION Bilateral 2000    HX GI  2004    colon resection after perforation during ovarian cyst removal    HX HEENT      skin cyst removed from neck    HX HEENT      uvuloplasty    HX HYSTERECTOMY  1982    and bladder repair    HX SHOULDER ARTHROSCOPY      right    HX UROLOGICAL      bladder repair during hysterectomy       Expanded or extensive additional review of patient history:     Home Situation  Home Environment: Private residence  # Steps to Enter: 1  One/Two Story Residence: Two story  # of Interior Steps: 12  Lift Chair Available: Yes  Living Alone: No ()  Support Systems: Spouse/Significant Other, Child(davonte)  Patient Expects to be Discharged to[de-identified] Home with family assistance  Current DME Used/Available at Home: Shower chair  Tub or Shower Type: Shower    Hand dominance: Right    EXAMINATION OF PERFORMANCE DEFICITS:  Cognitive/Behavioral Status:  Neurologic State: Alert;Confused  Orientation Level: Disoriented to person;Disoriented to place; Disoriented to situation;Oriented to time  Cognition: Appropriate decision making; Follows commands  Perception: Appears intact  Perseveration: No perseveration noted  Safety/Judgement: Awareness of environment;Home safety    Skin: intact where visible    Edema: none noted    Hearing: Auditory  Auditory Impairment: None    Vision/Perceptual:    Tracking: Able to track stimulus in all quadrants w/o difficulty                                Range of Motion:  AROM: Generally decreased, functional  PROM: Generally decreased, functional                      Strength:  Strength: Generally decreased, functional                Coordination:  Coordination: Generally decreased, functional  Fine Motor Skills-Upper: Left Intact; Right Intact    Gross Motor Skills-Upper: Left Intact; Right Intact    Tone & Sensation:  Tone: Normal  Sensation: Intact                      Balance:  Sitting: Intact; Without support  Standing: Impaired; Without support  Standing - Static: Good;Constant support  Standing - Dynamic : Fair;Constant support    Functional Mobility and Transfers for ADLs:  Bed Mobility:  Rolling: Contact guard assistance; Additional time;Bed Modified  Supine to Sit: Minimum assistance; Additional time;Assist x1;Bed Modified  Sit to Supine: Moderate assistance; Additional time  Scooting: Contact guard assistance; Additional time    Transfers:  Sit to Stand: Minimum assistance (bilateral HHA)  Stand to Sit: Contact guard assistance; Additional time    ADL Assessment:  Feeding: Setup    Oral Facial Hygiene/Grooming: Contact guard assistance    Bathing: Contact guard assistance    Upper Body Dressing: Setup    Lower Body Dressing: Contact guard assistance    Toileting: Minimum assistance                ADL Intervention and task modifications:                           Lower Body Dressing Assistance  Socks: Contact guard assistance  Position Performed: Seated edge of bed  Cues: Verbal cues provided         Cognitive Retraining  Safety/Judgement: Awareness of environment;Home safety    Functional Measure:  Barthel Index:    Bathin  Bladder: 10  Bowels: 10  Groomin  Dressin  Feedin  Mobility: 0  Stairs: 0  Toilet Use: 5  Transfer (Bed to Chair and Back): 10  Total: 50/100        The Barthel ADL Index: Guidelines  1. The index should be used as a record of what a patient does, not as a record of what a patient could do. 2. The main aim is to establish degree of independence from any help, physical or verbal, however minor and for whatever reason. 3. The need for supervision renders the patient not independent. 4. A patient's performance should be established using the best available evidence. Asking the patient, friends/relatives and nurses are the usual sources, but direct observation and common sense are also important. However direct testing is not needed. 5. Usually the patient's performance over the preceding 24-48 hours is important, but occasionally longer periods will be relevant. 6. Middle categories imply that the patient supplies over 50 per cent of the effort. 7. Use of aids to be independent is allowed. Padmini Chao., Barthel, D.W. ([id number]). Functional evaluation: the Barthel Index. [address] [facility] (14)2. Lakisha Triston el CARLY Nettles, Cynthia Barakat., Sylvester Yefri.Emily, [address] [place], [place] [address] ().  Measuring the change indisability after inpatient rehabilitation; comparison of the responsiveness of the Barthel Index and Functional [place] Measure. Journal of Neurology, Neurosurgery, and Psychiatry, 664), 149-304. DIPAK Pena, ZACHARIAH Nix, & Adia Haynes M.A. (2004.) Assessment of post-stroke quality of life in cost-effectiveness studies: The usefulness of the Barthel Index and the EuroQoL-5D. Quality of Life Research, 15, 570-67         Occupational Therapy Evaluation Charge Determination   History Examination Decision-Making   MEDIUM Complexity : Expanded review of history including physical, cognitive and psychosocial  history  LOW Complexity : 1-3 performance deficits relating to physical, cognitive , or psychosocial skils that result in activity limitations and / or participation restrictions  LOW Complexity : No comorbidities that affect functional and no verbal or physical assistance needed to complete eval tasks       Based on the above components, the patient evaluation is determined to be of the following complexity level: LOW   Pain Rating:  Mild rib pain, RN aware    Activity Tolerance:   Fair and requires rest breaks    After treatment patient left in no apparent distress:    Supine in bed, Call bell within reach, Caregiver / family present, and Side rails x 3    COMMUNICATION/EDUCATION:   The patients plan of care was discussed with: Physical therapist and Registered nurse. Home safety education was provided and the patient/caregiver indicated understanding., Patient/family have participated as able in goal setting and plan of care. , and Patient/family agree to work toward stated goals and plan of care. This patients plan of care is appropriate for delegation to [facility].     Thank you for this referral.  Tina Palmer, OT  Time Calculation: 25 mins

## 2022-10-04 NOTE — PROGRESS NOTES
HISTORY OF PRESENT ILLNESS: 77F with Alzheimer disease, HTN, presenting after witnessed cardiac arrest at home.  immediately started CPR, EMS then found Vifb with Shock x1, followed by asystole. ROSC < 5minutes reported. EMS transported to [facility] , [place] with pulse for 30 seconds started on amio gtt. CTH negative. Moved to [facility] CCU for further management.        Interval history    10/3-extubated this afternoon, dobutamine added today, still requiring pressors    10/4 - on/off vaso, on dobutamine      Current Facility-Administered Medications:     cefTRIAXone (ROCEPHIN) 1 g in 0.9% sodium chloride 10 mL IV syringe, 1 g, IntraVENous, Q24H, Alfonso Broussard MD, 1 g at 10/04/22 0814    therapeutic multivitamin (THERAGRAN) tablet 1 Tablet, 1 Tablet, Oral, DAILY, Dorrine Gitelman B, MD, 1 Tablet at 10/04/22 4100    ergocalciferol capsule 50,000 Units, 50,000 Units, Oral, Q7D, Nelson Castellanos MD, 50,000 Units at 10/04/22 0915    midodrine (PROAMATINE) tablet 20 mg, 20 mg, Oral, Q8H, Alfonso Broussard MD, 20 mg at 10/04/22 1357    DOBUTamine (DOBUTREX) 500 mg/250 mL (2,000 mcg/mL) infusion, 2.5 mcg/kg/min, IntraVENous, CONTINUOUS, Alfonso Broussard MD, Last Rate: 3.7 mL/hr at 10/04/22 0819, 2.5 mcg/kg/min at 10/04/22 0819    alteplase (CATHFLO) 1 mg in sterile water (preservative free) 1 mL injection, 1 mg, InterCATHeter, PRN, Dorrine Gitelman B, MD    vasopressin (VASOSTRICT) 20 Units in 0.9% sodium chloride 100 mL infusion, 0.04 Units/min, IntraVENous, CONTINUOUS, Alfonso Broussard MD, Last Rate: 3 mL/hr at 10/04/22 1239, 0.01 Units/min at 10/04/22 1239    potassium chloride SR (KLOR-CON 10) tablet 20 mEq, 20 mEq, Oral, DAILY, Nelson Castellanos MD, 20 mEq at 10/04/22 0814    HYDROmorphone (DILAUDID) injection 0.5 mg, 0.5 mg, IntraVENous, Q3H PRN, Alfonso Broussard MD    sodium chloride (NS) flush 5-40 mL, 5-40 mL, IntraVENous, Q8H, Jarod Liu DO, 10 mL at 10/04/22 1357    sodium chloride (NS) flush 5-40 mL, 5-40 mL, IntraVENous, PRN, Noberto Lint, DO    acetaminophen (TYLENOL) tablet 650 mg, 650 mg, Oral, Q6H PRN, 650 mg at 10/04/22 1258 **OR** acetaminophen (TYLENOL) suppository 650 mg, 650 mg, Rectal, Q6H PRN, Noberto Lint, DO    polyethylene glycol (MIRALAX) packet 17 g, 17 g, Oral, DAILY, Noberto Lint, DO, 17 g at 10/03/22 0265    senna-docusate (PERICOLACE) 8.6-50 mg per tablet 1 Tablet, 1 Tablet, Oral, BID, Noberto Lint, DO, 1 Tablet at 10/03/22 2100    magnesium hydroxide (MILK OF MAGNESIA) 400 mg/5 mL oral suspension 30 mL, 30 mL, Oral, DAILY PRN, Noberto Lint, DO    sodium phosphate (FLEET'S) enema 1 Enema, 1 Enema, Rectal, DAILY PRN, Noberto Lint, DO    ondansetron (ZOFRAN) injection 4 mg, 4 mg, IntraVENous, Q6H PRN, Noberto Lint, DO    chlorhexidine (PERIDEX) 0.12 % mouthwash 15 mL, 15 mL, Oral, Q12H, Liu, Jarod G, DO, 15 mL at 10/03/22 0947      VITAL SIGNS:  Visit Vitals  /74   Pulse 77   Temp 97.7 °F (36.5 °C)   Resp 24   Ht 5' 2.99\" (1.6 m)   Wt 50.3 kg (110 lb 14.3 oz)   SpO2 100%   BMI 19.65 kg/m²     PHYSICAL EXAMINATION:  General-NAD  Neuro-sleepy, nonfocal  Cardiac-regular  Lungs-clear anteriorly  Abdomen-soft, nontender, nondistended  Extremities-warm    LABORATORY ANALYSIS:  Recent Results (from the past 24 hour(s))   GLUCOSE, POC    Collection Time: 10/03/22  2:10 PM   Result Value Ref Range    Glucose (POC) 126 (H) 65 - 117 mg/dL    Performed by Kyle Baker    PTT    Collection Time: 10/03/22  4:20 PM   Result Value Ref Range    aPTT 52.8 (H) 22.1 - 31.0 sec    aPTT, therapeutic range     58.0 - 77.0 SECS   PTT    Collection Time: 10/04/22 12:16 AM   Result Value Ref Range    aPTT 70.4 (H) 22.1 - 31.0 sec    aPTT, therapeutic range     58.0 - 62.3 SECS   METABOLIC PANEL, COMPREHENSIVE    Collection Time: 10/04/22  6:14 AM   Result Value Ref Range    Sodium 133 (L) 136 - 145 mmol/L Potassium 3.9 3.5 - 5.1 mmol/L    Chloride 102 97 - 108 mmol/L    CO2 25 21 - 32 mmol/L    Anion gap 6 5 - 15 mmol/L    Glucose 92 65 - 100 mg/dL    BUN 26 (H) 6 - 20 MG/DL    Creatinine 0.98 0.55 - 1.02 MG/DL    BUN/Creatinine ratio 27 (H) 12 - 20      eGFR 59 (L) >60 ml/min/1.73m2    Calcium 8.4 (L) 8.5 - 10.1 MG/DL    Bilirubin, total 1.6 (H) 0.2 - 1.0 MG/DL    ALT (SGPT) 60 12 - 78 U/L    AST (SGOT) 31 15 - 37 U/L    Alk. phosphatase 78 45 - 117 U/L    Protein, total 4.6 (L) 6.4 - 8.2 g/dL    Albumin 2.8 (L) 3.5 - 5.0 g/dL    Globulin 1.8 (L) 2.0 - 4.0 g/dL    A-G Ratio 1.6 1.1 - 2.2     MAGNESIUM    Collection Time: 10/04/22  6:14 AM   Result Value Ref Range    Magnesium 1.6 1.6 - 2.4 mg/dL   PHOSPHORUS    Collection Time: 10/04/22  6:14 AM   Result Value Ref Range    Phosphorus 1.9 (L) 2.6 - 4.7 MG/DL   CBC WITH AUTOMATED DIFF    Collection Time: 10/04/22  6:14 AM   Result Value Ref Range    WBC 7.1 3.6 - 11.0 K/uL    RBC 3.61 (L) 3.80 - 5.20 M/uL    HGB 9.0 (L) 11.5 - 16.0 g/dL    HCT 28.4 (L) 35.0 - 47.0 %    MCV 78.7 (L) 80.0 - 99.0 FL    MCH 24.9 (L) 26.0 - 34.0 PG    MCHC 31.7 30.0 - 36.5 g/dL    RDW 12.8 11.5 - 14.5 %    PLATELET 39 (LL) 690 - 400 K/uL    NRBC 0.0 0  WBC    ABSOLUTE NRBC 0.00 0.00 - 0.01 K/uL    NEUTROPHILS 78 (H) 32 - 75 %    LYMPHOCYTES 9 (L) 12 - 49 %    MONOCYTES 10 5 - 13 %    EOSINOPHILS 2 0 - 7 %    BASOPHILS 0 0 - 1 %    IMMATURE GRANULOCYTES 1 (H) 0.0 - 0.5 %    ABS. NEUTROPHILS 5.6 1.8 - 8.0 K/UL    ABS. LYMPHOCYTES 0.6 (L) 0.8 - 3.5 K/UL    ABS. MONOCYTES 0.7 0.0 - 1.0 K/UL    ABS. EOSINOPHILS 0.1 0.0 - 0.4 K/UL    ABS. BASOPHILS 0.0 0.0 - 0.1 K/UL    ABS. IMM.  GRANS. 0.1 (H) 0.00 - 0.04 K/UL    DF SMEAR SCANNED      RBC COMMENTS ANISOCYTOSIS  1+        RBC COMMENTS HYPOCHROMIA  1+       PROTHROMBIN TIME + INR    Collection Time: 10/04/22  6:14 AM   Result Value Ref Range    INR 1.2 (H) 0.9 - 1.1      Prothrombin time 12.4 (H) 9.0 - 11.1 sec   PTT    Collection Time: 10/04/22  6:18 AM   Result Value Ref Range    aPTT 68.0 (H) 22.1 - 31.0 sec    aPTT, therapeutic range     58.0 - 77.0 SECS   NT-PRO BNP    Collection Time: 10/04/22  6:18 AM   Result Value Ref Range    NT pro-BNP 30,592 (H) <450 PG/ML   C REACTIVE PROTEIN, QT    Collection Time: 10/04/22  6:18 AM   Result Value Ref Range    C-Reactive protein 4.95 (H) 0.00 - 0.60 mg/dL   PROCALCITONIN    Collection Time: 10/04/22  6:18 AM   Result Value Ref Range    Procalcitonin 1.58 ng/mL   SED RATE (ESR)    Collection Time: 10/04/22  6:18 AM   Result Value Ref Range    Sed rate, automated 3 0 - 30 mm/hr   IRON PROFILE    Collection Time: 10/04/22  6:18 AM   Result Value Ref Range    Iron 37 35 - 150 ug/dL    TIBC 164 (L) 250 - 450 ug/dL    Iron % saturation 23 20 - 50 %   FERRITIN    Collection Time: 10/04/22  6:18 AM   Result Value Ref Range    Ferritin 322 26 - 388 NG/ML   TSH 3RD GENERATION    Collection Time: 10/04/22  6:18 AM   Result Value Ref Range    TSH 0.41 0.36 - 3.74 uIU/mL   CK    Collection Time: 10/04/22  6:18 AM   Result Value Ref Range     (H) 26 - 192 U/L   VITAMIN D, 25 HYDROXY    Collection Time: 10/04/22  6:18 AM   Result Value Ref Range    Vitamin D 25-Hydroxy 20.9 (L) 30 - 100 ng/mL   URIC ACID    Collection Time: 10/04/22  6:18 AM   Result Value Ref Range    Uric acid 3.5 2.6 - 6.0 MG/DL   CORTISOL    Collection Time: 10/04/22  6:18 AM   Result Value Ref Range    Cortisol, random 24.0 ug/dL   LACTIC ACID    Collection Time: 10/04/22  6:25 AM   Result Value Ref Range    Lactic acid 1.4 0.4 - 2.0 MMOL/L   AMMONIA    Collection Time: 10/04/22  6:25 AM   Result Value Ref Range    Ammonia, plasma 17 <32 UMOL/L     No results displayed because visit has over 200 results.         EKG Results       Procedure 720 Value Units Date/Time    EKG, 12 LEAD, INITIAL [[id number]]     Order Status: Sent     EKG, 12 LEAD, INITIAL [[id number]] Collected: 10/01/22 2202    Order Status: Completed Updated: 10/03/22 2205 Ventricular Rate 62 BPM      Atrial Rate 62 BPM      P-R Interval 174 ms      QRS Duration 100 ms      Q-T Interval 478 ms      QTC Calculation (Bezet) 485 ms      Calculated P Axis 85 degrees      Calculated R Axis 85 degrees      Calculated T Axis -125 degrees      Diagnosis --     Normal sinus rhythm  Minimal voltage criteria for LVH, may be normal variant ( Cesar product )  ST & T wave abnormality, consider anterior ischemia  Prolonged QT  Abnormal ECG  Confirmed by Antony RUTH MD. ([id number]) on 10/3/2022 10:05:32 PM      EKG, 12 LEAD, INITIAL [[id number]] Collected: 10/03/22 0440    Order Status: Completed Updated: 10/03/22 2147     Ventricular Rate 60 BPM      Atrial Rate 60 BPM      P-R Interval 158 ms      QRS Duration 88 ms      Q-T Interval 582 ms      QTC Calculation (Bezet) 582 ms      Calculated R Axis -35 degrees      Calculated T Axis 124 degrees      Diagnosis --     Normal sinus rhythm  Left axis deviation  Nonspecific ST abnormality    When compared with ECG of 02-OCT-2022 06:39,  Left bundle branch block is no longer present  Confirmed by Milla Bernal M.D., Krista Valenzuela ([id number]) on 10/3/2022 9:47:18 PM      EKG, 12 LEAD, INITIAL [[id number]] Collected: 10/02/22 0639    Order Status: Completed Updated: 10/03/22 2050     Ventricular Rate 47 BPM      Atrial Rate 47 BPM      P-R Interval 88 ms      QRS Duration 156 ms      Q-T Interval 754 ms      QTC Calculation (Bezet) 667 ms      Calculated R Axis -83 degrees      Calculated T Axis 104 degrees      Diagnosis --     Marked sinus bradycardia with short IN  Left bundle branch block    When compared with ECG of 01-OCT-2022 22:02,  Left bundle branch block is now present  The heart rate has decreased    Confirmed by Milla Bernal M.D., Krista Valenzuela ([id number]) on 10/3/2022 8:50:23 PM      EKG, 12 LEAD, INITIAL [[id number]]     Order Status: Sent               RADIOGRAPHIC STUDIES:  XR CHEST PORT  Narrative: INDICATION: ETT    EXAMINATION:  AP CHEST, PORTABLE    COMPARISON: October 2, 2022    FINDINGS: Single AP portable view of the chest demonstrates interval extubation  and removal of the nasogastric tube. There has been interval placement of a  right IJ line with tip over the distal superior vena cava. The cardiomediastinal  silhouette is unchanged. Lungs are adequately expanded and clear with no  pneumothorax. Impression: Life-support lines and tubes as described. No pneumothorax. DIAGNOSES:  Cardiac Arrest  NSTEMI  Alzheimer's Disease  HTN    IMPRESSION AND PLAN:    Neuro-  Delirium prevention strategies    Cardiac  Continue hemodynamic monitoring, map goal greater than 65, EF on most recent echo 10 to 15%, TAPSE 1.4 cm, CVP consistently less than 10, dobutamine-continue for now, vasopressin as needed to maintain maps, follow-up cardiology and advanced heart failure recommendations    Pulm  On RA today    GI  Diet as tolerated    Renal  Kidney function improving, maintain and even to negative fluid balance daily for now, monitor urine output, correct electrolyte derangements as needed    Heme  Impressive drop in plt count - heparin gtt d/madiha, HIT sent    ID  UA positive on admission, cont ceftriaxone, f/u micro studies    Endo-  Keep glucose less than 180    Critical care time-40 minutes    The patient is critically ill with single or multiple systems failure, severe metabolic derangement and/or infection, has potential for life threatening deterioration, requires high complexity decision making, frequent evaluation and titration of therapies and interpretation of data. This note has been written with voice recognition software. While this note has been edited for accuracy, the software periodically misinterprets speech resulting in errors that might not have been caught in editing.  In the event an unusual error is found in this record, please read the chart carefully and recognize, using context, where these substitutions or errors have occurred and please notify me to resolve the errors.

## 2022-10-04 NOTE — PROGRESS NOTES
Problem: Mobility Impaired (Adult and Pediatric)  Goal: *Acute Goals and Plan of Care (Insert Text)  Description: FUNCTIONAL STATUS PRIOR TO ADMISSION: Patient was independent and active without use of DME. Ambulating community distances. Denies fall hx. Per family, pt sometimes uses chair lift to 2nd floor bedroom if \"feeling weak that day\". Pt with pmh of alzheimer's. HOME SUPPORT PRIOR TO ADMISSION: The patient lived with spouse but did not require assist.    Physical Therapy Goals  Initiated 10/4/2022  1. Patient will move from supine to sit and sit to supine  in bed with modified independence within 7 day(s). 2.  Patient will transfer from bed to chair and chair to bed with modified independence using the least restrictive device within 7 day(s). 3.  Patient will perform sit to stand with modified independence within 7 day(s). 4.  Patient will ambulate with modified independence for 300 feet with the least restrictive device within 7 day(s). 5.  Patient will ascend/descend 12 stairs with 1 handrail(s) with supervision/set-up within 7 day(s). Outcome: Not Met       PHYSICAL THERAPY EVALUATION  Patient: Saira Smiley (81 y.o. female)  Date: 10/4/2022  Primary Diagnosis: Cardiac arrest ([facility] [address] [I46.9]  Procedure(s) (LRB):  LEFT HEART CATH / CORONARY ANGIOGRAPHY (N/A)     Precautions:   Skin    ASSESSMENT  Based on the objective data described below, the patient presents with decreased activity tolerance, decreased functional mobility, decreased strength, impaired balance, decreased orientation, and increased pain associated with CPR. Pt with pmh of Alzheimer disease, HTN, presenting after witnessed cardiac arrest at home,  performed CPR and pt was shocked by EMS. Pt generally mobilized at a Mod A to CGA level during today's session. Pt received semi hull in bed, on RA, on tele, presser support, family and RN in room and agreeable to mobilize with therapy.  Pt went supine>sit with HOB elevated and max VC/TC for proper sequencing. Pt sat EOB ~ 4 minutes before going sit>stand and taking side steps to [facility] before going stand>sit and sit>supine. During mobility, BP monitored and stable but not recorded. Anticipate pt will progress to d/c home with HHPT and family assist.  is very supportive. Current Level of Function Impacting Discharge (mobility/balance): Up to Mod A for mobility, requires ambulation trials and stair training    Functional Outcome Measure: The patient scored 0 on the 5xSTS outcome measure which is indicative of high fall risk. Other factors to consider for discharge: PLOF, alzheimer's, supportive family     Patient will benefit from skilled therapy intervention to address the above noted impairments. PLAN :  Recommendations and Planned Interventions: bed mobility training, transfer training, gait training, therapeutic exercises, neuromuscular re-education, patient and family training/education, and therapeutic activities      Frequency/Duration: Patient will be followed by physical therapy:  4 times a week to address goals. Recommendation for discharge: (in order for the patient to meet his/her long term goals)  Physical therapy at least 2 days/week in the home with family support    This discharge recommendation:  Has not yet been discussed the attending provider and/or case management    IF patient discharges home will need the following DME: tbd pending gait trails         SUBJECTIVE:   Patient stated I have 2 daughters.     OBJECTIVE DATA SUMMARY:   HISTORY:    Past Medical History:   Diagnosis Date    Arthritis     Chronic pain     \"my right side has been hurting for 3-4 months\"    Hypertension      Past Surgical History:   Procedure Laterality Date    HX BREAST BIOPSY Bilateral     neg    HX BREAST REDUCTION Bilateral 2000    HX GI  2004    colon resection after perforation during ovarian cyst removal    HX HEENT      skin cyst removed from neck    HX HEENT      uvuloplasty    HX HYSTERECTOMY  1982    and bladder repair    HX SHOULDER ARTHROSCOPY      right    HX UROLOGICAL      bladder repair during hysterectomy       Personal factors and/or comorbidities impacting plan of care: pmh    Home Situation  Home Environment: Private residence  # Steps to Enter: 1  One/Two Story Residence: Two story  # of Interior Steps: 12  Lift Chair Available: Yes  Living Alone: No ()  Support Systems: Spouse/Significant Other, Child(davonte)  Patient Expects to be Discharged to[de-identified] Home with family assistance  Current DME Used/Available at Home: Shower chair  Tub or Shower Type: Shower    EXAMINATION/PRESENTATION/DECISION MAKING:   Critical Behavior:  Neurologic State: Alert, Confused  Orientation Level: Disoriented to person, Disoriented to place, Disoriented to situation, Oriented to time  Cognition: Memory loss     Hearing: Auditory  Auditory Impairment: None    Range Of Motion:  AROM: Generally decreased, functional           PROM: Generally decreased, functional           Strength:    Strength: Generally decreased, functional          Functional Mobility:  Bed Mobility:  Rolling: Contact guard assistance; Additional time;Bed Modified  Supine to Sit: Minimum assistance; Additional time;Assist x1;Bed Modified  Sit to Supine: Moderate assistance; Additional time  Scooting: Contact guard assistance; Additional time  Transfers:  Sit to Stand: Minimum assistance (bilateral HHA)  Stand to Sit: Contact guard assistance; Additional time                       Balance:   Sitting: Intact; Without support  Standing: Impaired; Without support  Standing - Static: Good;Constant support  Standing - Dynamic : Fair;Constant support      Functional Measure:  Five times sit to stand:    Five Times Sit to Stand: 0 Seconds (unable to perform without UE support)         Normative averages:  Clients younger than 61years old  £  10 seconds = Normal   Clients older than 61years old £ 14.2 seconds = Normal Change of ³ 2.3 seconds shows a significant clinical improvement    Ector BORJA. Reference values for the five repetition sit to stand test: a descriptive metaanalysis of data from elders. Percept Mot Skills 2006; 103(1):215-222. Physical Therapy Evaluation Charge Determination   History Examination Presentation Decision-Making   HIGH Complexity :3+ comorbidities / personal factors will impact the outcome/ POC  MEDIUM Complexity : 3 Standardized tests and measures addressing body structure, function, activity limitation and / or participation in recreation  MEDIUM Complexity : Evolving with changing characteristics  Other outcome measures 5xsts  HIGH       Based on the above components, the patient evaluation is determined to be of the following complexity level: MEDIUM    Pain Rating:  Pt did not quantify pain during session      Activity Tolerance:   Fair, tolerates ADLs without rest breaks, and SpO2 stable on RA    After treatment patient left in no apparent distress:   Supine in bed, Call bell within reach, Caregiver / family present, and Side rails x 3    COMMUNICATION/EDUCATION:   The patients plan of care was discussed with: Occupational therapist and Registered nurse. Fall prevention education was provided and the patient/caregiver indicated understanding., Patient/family have participated as able in goal setting and plan of care. , and Patient/family agree to work toward stated goals and plan of care.     Thank you for this referral.  Kenyon West, PT   Time Calculation: 25 mins

## 2022-10-04 NOTE — PROGRESS NOTES
SPEECH PATHOLOGY BEDSIDE SWALLOW EVALUATION/DISCHARGE  Patient: Andres Magana (65 y.o. female)  Date: 10/4/2022  Primary Diagnosis: Cardiac arrest ([facility] [address] [I46.9]  Procedure(s) (LRB):  LEFT HEART CATH / CORONARY ANGIOGRAPHY (N/A)     Precautions:   Skin    ASSESSMENT :  Based on the objective data described below, the patient presents with functional oropharyngeal swallow with no difficulties or s/s of aspiration appreciated at bedside with any consistencies. Patient denies any speech or swallowing concerns. Recommend regular diet/thin liquids with general aspiration precautions. Pt's family concerned that her speech is slower- suspect that this is related to pt's moment of hypoxia s/p cardiac arrest. Suspect pt is at baseline swallow function and therefore, skilled acute therapy provided by a speech-language pathologist is not indicated at this time. SLP will sign off. Please reconsult with any changes or if SLP can be of any further assistance. .  Skilled acute therapy provided by a speech-language pathologist is not indicated at this time. PLAN :  Recommendations:  -- regular diet/ thin liquids  -- general aspiration precautions including completely upright for all PO   -- SLP will sign off     Discharge Recommendations: None     SUBJECTIVE:   Patient stated, \"Cardiomyopathy? What's that?  after reading her white board.     OBJECTIVE:     Past Medical History:   Diagnosis Date    Arthritis     Chronic pain     \"my right side has been hurting for 3-4 months\"    Hypertension      Past Surgical History:   Procedure Laterality Date    HX BREAST BIOPSY Bilateral     neg    HX BREAST REDUCTION Bilateral 2000    HX GI  2004    colon resection after perforation during ovarian cyst removal    HX HEENT      skin cyst removed from neck    HX HEENT      uvuloplasty    HX HYSTERECTOMY  1982    and bladder repair    HX SHOULDER ARTHROSCOPY      right    HX UROLOGICAL      bladder repair during hysterectomy     Prior Level of Function/Home Situation:   Home Situation  Home Environment: Private residence  # Steps to Enter: 1  One/Two Story Residence: Two story  # of Interior Steps: 12  Lift Chair Available: Yes  Living Alone: No ()  Support Systems: Spouse/Significant Other, Child(davonte)  Patient Expects to be Discharged to[de-identified] Home with family assistance  Current DME Used/Available at Home: Shower chair  Tub or Shower Type: Shower  Diet prior to admission: regular diet/thin liquids  Current Diet:  clear liquid diet   Cognitive and Communication Status:  Neurologic State: Alert, Confused  Orientation Level: Disoriented to person, Disoriented to place, Disoriented to situation, Oriented to time  Cognition: Appropriate decision making, Follows commands  Perception: Appears intact  Perseveration: No perseveration noted  Safety/Judgement: Awareness of environment, Home safety  Oral Assessment:  Oral Assessment  Labial: No impairment  Dentition: Natural  Oral Hygiene: oral mucosa moist and clear of secretions  Lingual: No impairment  Velum: No impairment  Mandible: No impairment  P.O. Trials:  Patient Position: upright in bed  Vocal quality prior to P.O.: No impairment  Consistency Presented: Thin liquid;Puree; Solid  How Presented: Self-fed/presented;Cup/sip;Spoon;Straw;Successive swallows     Bolus Acceptance: No impairment  Bolus Formation/Control: No impairment     Propulsion: No impairment  Oral Residue: None  Initiation of Swallow: No impairment  Laryngeal Elevation: Functional  Aspiration Signs/Symptoms: None  Pharyngeal Phase Characteristics: No impairment, issues, or problems   Effective Modifications: None          Oral Phase Severity: No impairment  Pharyngeal Phase Severity : No impairment  NOMS:   The NOMS functional outcome measure was used to quantify this patient's level of swallowing impairment.   Based on the NOMS, the patient was determined to be at level 7 for swallow function     NOMS Swallowing Levels:  Level 1 (CN): NPO  Level 2 (CM): NPO but takes consistency in therapy  Level 3 (CL): Takes less than 50% of nutrition p.o. and continues with nonoral feedings; and/or safe with mod cues; and/or max diet restriction  Level 4 (CK): Safe swallow but needs mod cues; and/or mod diet restriction; and/or still requires some nonoral feeding/supplements  Level 5 (CJ): Safe swallow with min diet restriction; and/or needs min cues  Level 6 (CI): Independent with p.o.; rare cues; usually self cues; may need to avoid some foods or needs extra time  Level 7 ([address] [place] [address]): Independent for all p.o.  AMBIKA. (2003). National Outcomes Measurement System (NOMS): Adult Speech-Language Pathology User's Guide. Pain:  Pain Scale 1: Numeric (0 - 10)  Pain Intensity 1: 0     After treatment:   Call bell within reach and Nursing notified    COMMUNICATION/EDUCATION:   Patient was educated regarding her functional as this relates to her diagnosis of cardiac arrest.  She demonstrated Fair understanding as evidenced by expressing understanding but with baseline cognitive dysfunction. The patient's plan of care including recommendations, planned interventions, and recommended diet changes were discussed with: Registered nurse.      Thank you for this referral.  Janet Garcia, ROBI  Time Calculation: 10 mins

## 2022-10-05 LAB
ALBUMIN SERPL-MCNC: 2.8 G/DL (ref 3.5–5)
ALBUMIN SERPL-MCNC: 3.1 G/DL (ref 3.5–5)
ALBUMIN/GLOB SERPL: 1.4 {RATIO} (ref 1.1–2.2)
ALBUMIN/GLOB SERPL: 1.4 {RATIO} (ref 1.1–2.2)
ALP SERPL-CCNC: 101 U/L (ref 45–117)
ALP SERPL-CCNC: 91 U/L (ref 45–117)
ALT SERPL-CCNC: 53 U/L (ref 12–78)
ALT SERPL-CCNC: 53 U/L (ref 12–78)
ANA SER QL: NEGATIVE
ANION GAP SERPL CALC-SCNC: 5 MMOL/L (ref 5–15)
APTT PPP: 28.8 SEC (ref 22.1–31)
APTT PPP: 32.4 SEC (ref 22.1–31)
AST SERPL-CCNC: 30 U/L (ref 15–37)
AST SERPL-CCNC: 30 U/L (ref 15–37)
ATRIAL RATE: 74 BPM
BASOPHILS # BLD: 0 K/UL (ref 0–0.1)
BASOPHILS NFR BLD: 0 % (ref 0–1)
BILIRUB DIRECT SERPL-MCNC: 0.4 MG/DL (ref 0–0.2)
BILIRUB SERPL-MCNC: 1.1 MG/DL (ref 0.2–1)
BILIRUB SERPL-MCNC: 1.4 MG/DL (ref 0.2–1)
BNP SERPL-MCNC: ABNORMAL PG/ML
BUN SERPL-MCNC: 22 MG/DL (ref 6–20)
BUN/CREAT SERPL: 25 (ref 12–20)
CALCIUM SERPL-MCNC: 8.7 MG/DL (ref 8.5–10.1)
CALCULATED P AXIS, ECG09: 63 DEGREES
CALCULATED R AXIS, ECG10: -22 DEGREES
CALCULATED T AXIS, ECG11: -170 DEGREES
CHLORIDE SERPL-SCNC: 101 MMOL/L (ref 97–108)
CO2 SERPL-SCNC: 25 MMOL/L (ref 21–32)
COMMENT, HOLDF: NORMAL
CREAT SERPL-MCNC: 0.89 MG/DL (ref 0.55–1.02)
CRP SERPL-MCNC: 3.19 MG/DL (ref 0–0.6)
DIAGNOSIS, 93000: NORMAL
DIFFERENTIAL METHOD BLD: ABNORMAL
EOSINOPHIL # BLD: 0.2 K/UL (ref 0–0.4)
EOSINOPHIL NFR BLD: 3 % (ref 0–7)
ERYTHROCYTE [DISTWIDTH] IN BLOOD BY AUTOMATED COUNT: 12.6 % (ref 11.5–14.5)
GLOBULIN SER CALC-MCNC: 2 G/DL (ref 2–4)
GLOBULIN SER CALC-MCNC: 2.2 G/DL (ref 2–4)
GLUCOSE SERPL-MCNC: 91 MG/DL (ref 65–100)
HCT VFR BLD AUTO: 26.7 % (ref 35–47)
HGB BLD-MCNC: 8.6 G/DL (ref 11.5–16)
IMM GRANULOCYTES # BLD AUTO: 0 K/UL (ref 0–0.04)
IMM GRANULOCYTES NFR BLD AUTO: 0 % (ref 0–0.5)
INR PPP: 1.1 (ref 0.9–1.1)
LACTATE SERPL-SCNC: 1 MMOL/L (ref 0.4–2)
LYMPHOCYTES # BLD: 0.7 K/UL (ref 0.8–3.5)
LYMPHOCYTES NFR BLD: 12 % (ref 12–49)
MAGNESIUM SERPL-MCNC: 2.1 MG/DL (ref 1.6–2.4)
MCH RBC QN AUTO: 24.6 PG (ref 26–34)
MCHC RBC AUTO-ENTMCNC: 32.2 G/DL (ref 30–36.5)
MCV RBC AUTO: 76.3 FL (ref 80–99)
MONOCYTES # BLD: 0.6 K/UL (ref 0–1)
MONOCYTES NFR BLD: 11 % (ref 5–13)
NEUTS SEG # BLD: 4.1 K/UL (ref 1.8–8)
NEUTS SEG NFR BLD: 74 % (ref 32–75)
NRBC # BLD: 0 K/UL (ref 0–0.01)
NRBC BLD-RTO: 0 PER 100 WBC
NSE SERPL IA-MCNC: 14.7 NG/ML (ref 0–17.6)
NSE SERPL IA-MCNC: 16.7 NG/ML (ref 0–17.6)
P-R INTERVAL, ECG05: 170 MS
PF4 HEPARIN CMPLX AB SER-ACNC: 0.07 OD (ref 0–0.4)
PHOSPHATE SERPL-MCNC: 2.2 MG/DL (ref 2.6–4.7)
PLATELET # BLD AUTO: 55 K/UL (ref 150–400)
POTASSIUM SERPL-SCNC: 4.4 MMOL/L (ref 3.5–5.1)
PROCALCITONIN SERPL-MCNC: 0.66 NG/ML
PROT SERPL-MCNC: 4.8 G/DL (ref 6.4–8.2)
PROT SERPL-MCNC: 5.3 G/DL (ref 6.4–8.2)
PROTHROMBIN TIME: 11.7 SEC (ref 9–11.1)
Q-T INTERVAL, ECG07: 482 MS
QRS DURATION, ECG06: 92 MS
QTC CALCULATION (BEZET), ECG08: 535 MS
RBC # BLD AUTO: 3.5 M/UL (ref 3.8–5.2)
RBC MORPH BLD: ABNORMAL
SAMPLES BEING HELD,HOLD: NORMAL
SODIUM SERPL-SCNC: 131 MMOL/L (ref 136–145)
THERAPEUTIC RANGE,PTTT: ABNORMAL SECS (ref 58–77)
THERAPEUTIC RANGE,PTTT: NORMAL SECS (ref 58–77)
VENTRICULAR RATE, ECG03: 74 BPM
WBC # BLD AUTO: 5.6 K/UL (ref 3.6–11)

## 2022-10-05 PROCEDURE — 83880 ASSAY OF NATRIURETIC PEPTIDE: CPT

## 2022-10-05 PROCEDURE — 85730 THROMBOPLASTIN TIME PARTIAL: CPT

## 2022-10-05 PROCEDURE — 74011250636 HC RX REV CODE- 250/636: Performed by: STUDENT IN AN ORGANIZED HEALTH CARE EDUCATION/TRAINING PROGRAM

## 2022-10-05 PROCEDURE — 36415 COLL VENOUS BLD VENIPUNCTURE: CPT

## 2022-10-05 PROCEDURE — 74011000250 HC RX REV CODE- 250: Performed by: EMERGENCY MEDICINE

## 2022-10-05 PROCEDURE — 84100 ASSAY OF PHOSPHORUS: CPT

## 2022-10-05 PROCEDURE — 65620000000 HC RM CCU GENERAL

## 2022-10-05 PROCEDURE — 74011000250 HC RX REV CODE- 250

## 2022-10-05 PROCEDURE — 80053 COMPREHEN METABOLIC PANEL: CPT

## 2022-10-05 PROCEDURE — 85025 COMPLETE CBC W/AUTO DIFF WBC: CPT

## 2022-10-05 PROCEDURE — 77030029065 HC DRSG HEMO QCLOT ZMED -B

## 2022-10-05 PROCEDURE — 80076 HEPATIC FUNCTION PANEL: CPT

## 2022-10-05 PROCEDURE — 97535 SELF CARE MNGMENT TRAINING: CPT

## 2022-10-05 PROCEDURE — 86140 C-REACTIVE PROTEIN: CPT

## 2022-10-05 PROCEDURE — 83605 ASSAY OF LACTIC ACID: CPT

## 2022-10-05 PROCEDURE — 83735 ASSAY OF MAGNESIUM: CPT

## 2022-10-05 PROCEDURE — 74011250636 HC RX REV CODE- 250/636: Performed by: EMERGENCY MEDICINE

## 2022-10-05 PROCEDURE — 74011250637 HC RX REV CODE- 250/637: Performed by: STUDENT IN AN ORGANIZED HEALTH CARE EDUCATION/TRAINING PROGRAM

## 2022-10-05 PROCEDURE — 74011250637 HC RX REV CODE- 250/637: Performed by: EMERGENCY MEDICINE

## 2022-10-05 PROCEDURE — 84145 PROCALCITONIN (PCT): CPT

## 2022-10-05 PROCEDURE — 85610 PROTHROMBIN TIME: CPT

## 2022-10-05 PROCEDURE — 99233 SBSQ HOSP IP/OBS HIGH 50: CPT | Performed by: INTERNAL MEDICINE

## 2022-10-05 PROCEDURE — 97116 GAIT TRAINING THERAPY: CPT

## 2022-10-05 PROCEDURE — 97530 THERAPEUTIC ACTIVITIES: CPT

## 2022-10-05 PROCEDURE — 74011250637 HC RX REV CODE- 250/637: Performed by: INTERNAL MEDICINE

## 2022-10-05 RX ORDER — LIDOCAINE 40 MG/G
CREAM TOPICAL AS NEEDED
Status: DISCONTINUED | OUTPATIENT
Start: 2022-10-05 | End: 2022-10-13 | Stop reason: HOSPADM

## 2022-10-05 RX ORDER — SODIUM CHLORIDE 0.9 % (FLUSH) 0.9 %
5-40 SYRINGE (ML) INJECTION AS NEEDED
Status: DISCONTINUED | OUTPATIENT
Start: 2022-10-05 | End: 2022-10-06 | Stop reason: HOSPADM

## 2022-10-05 RX ORDER — BACITRACIN 500 UNIT/G
PACKET (EA) TOPICAL
Status: COMPLETED
Start: 2022-10-05 | End: 2022-10-05

## 2022-10-05 RX ORDER — SPIRONOLACTONE 25 MG/1
12.5 TABLET ORAL DAILY
Status: DISCONTINUED | OUTPATIENT
Start: 2022-10-05 | End: 2022-10-12

## 2022-10-05 RX ORDER — SODIUM CHLORIDE 0.9 % (FLUSH) 0.9 %
5-40 SYRINGE (ML) INJECTION EVERY 8 HOURS
Status: DISCONTINUED | OUTPATIENT
Start: 2022-10-05 | End: 2022-10-06 | Stop reason: HOSPADM

## 2022-10-05 RX ADMIN — POTASSIUM CHLORIDE 20 MEQ: 750 TABLET, FILM COATED, EXTENDED RELEASE ORAL at 08:10

## 2022-10-05 RX ADMIN — LIDOCAINE 4%: 4 CREAM TOPICAL at 15:59

## 2022-10-05 RX ADMIN — MIDODRINE HYDROCHLORIDE 20 MG: 5 TABLET ORAL at 06:53

## 2022-10-05 RX ADMIN — ACETAMINOPHEN 650 MG: 325 TABLET, FILM COATED ORAL at 04:27

## 2022-10-05 RX ADMIN — MIDODRINE HYDROCHLORIDE 20 MG: 5 TABLET ORAL at 21:18

## 2022-10-05 RX ADMIN — GUAIFENESIN 100 MG: 200 SOLUTION ORAL at 03:01

## 2022-10-05 RX ADMIN — SPIRONOLACTONE 12.5 MG: 25 TABLET ORAL at 10:54

## 2022-10-05 RX ADMIN — HYDROMORPHONE HYDROCHLORIDE 0.5 MG: 1 INJECTION, SOLUTION INTRAMUSCULAR; INTRAVENOUS; SUBCUTANEOUS at 22:47

## 2022-10-05 RX ADMIN — MIDODRINE HYDROCHLORIDE 20 MG: 5 TABLET ORAL at 15:59

## 2022-10-05 RX ADMIN — LIDOCAINE 4%: 4 CREAM TOPICAL at 21:19

## 2022-10-05 RX ADMIN — SENNOSIDES AND DOCUSATE SODIUM 1 TABLET: 50; 8.6 TABLET ORAL at 21:18

## 2022-10-05 RX ADMIN — Medication 1 PACKET: at 16:30

## 2022-10-05 RX ADMIN — SODIUM CHLORIDE 1 G: 9 INJECTION INTRAMUSCULAR; INTRAVENOUS; SUBCUTANEOUS at 08:10

## 2022-10-05 RX ADMIN — CHLORHEXIDINE GLUCONATE 15 ML: 1.2 RINSE ORAL at 21:19

## 2022-10-05 RX ADMIN — ONDANSETRON 4 MG: 2 INJECTION INTRAMUSCULAR; INTRAVENOUS at 11:52

## 2022-10-05 RX ADMIN — THERA TABS 1 TABLET: TAB at 08:10

## 2022-10-05 RX ADMIN — SODIUM PHOSPHATE, MONOBASIC, MONOHYDRATE AND SODIUM PHOSPHATE, DIBASIC, ANHYDROUS: 276; 142 INJECTION, SOLUTION INTRAVENOUS at 08:11

## 2022-10-05 RX ADMIN — ACETAMINOPHEN 650 MG: 325 TABLET, FILM COATED ORAL at 10:54

## 2022-10-05 NOTE — PROGRESS NOTES
JOHN: Anticipate discharge home with [facility] PT/OT through [facility] pending medical progress. Transportation likely in car with /daughter. RUR: 15%     Emergency contact: Nilton Love, spouse, [phone]     Disposition: Patient admitted 10/2 for cardiac arrest. Patient is from home where she lives with her .  is primary caregiver. Patient does have a history of dementia. CM met with family at bedside and provided a list of resources to include day programming and personal care agencies. No barriers not at this time. CM will continue to follow for discharge needs.     Barry Monterroso, [facility],[address]  [phone]

## 2022-10-05 NOTE — PROGRESS NOTES
Problem: Mobility Impaired (Adult and Pediatric)  Goal: *Acute Goals and Plan of Care (Insert Text)  Description: FUNCTIONAL STATUS PRIOR TO ADMISSION: Patient was independent and active without use of DME. Ambulating community distances. Denies fall hx. Per family, pt sometimes uses chair lift to 2nd floor bedroom if \"feeling weak that day\". Pt with pmh of alzheimer's. HOME SUPPORT PRIOR TO ADMISSION: The patient lived with spouse but did not require assist.    Physical Therapy Goals  Initiated 10/4/2022  1. Patient will move from supine to sit and sit to supine  in bed with modified independence within 7 day(s). 2.  Patient will transfer from bed to chair and chair to bed with modified independence using the least restrictive device within 7 day(s). 3.  Patient will perform sit to stand with modified independence within 7 day(s). 4.  Patient will ambulate with modified independence for 300 feet with the least restrictive device within 7 day(s). 5.  Patient will ascend/descend 12 stairs with 1 handrail(s) with supervision/set-up within 7 day(s). Outcome: Progressing Towards Goal       PHYSICAL THERAPY TREATMENT  Patient: Ortiz Patel (59 y.o. female)  Date: 10/5/2022  Diagnosis: Cardiac arrest ([facility] [address] [I46.9] <principal problem not specified>  Procedure(s) (LRB):  LEFT HEART CATH / CORONARY ANGIOGRAPHY (N/A)    Precautions: Skin  Chart, physical therapy assessment, plan of care and goals were reviewed. ASSESSMENT  Patient continues with skilled PT services and is progressing towards goals. Pt agreeable to therapy. On initial stand pt reports needing to use the commode. Pt was able to manage self care. Pt attempted gait with multiple LOB and unsteadiness. Pt reaching for objects. Pt may benefit from rolling walker next trial. Family arrived post session. Reports pt has a h/o a growth on right hip that periodically hurts pt causing her to use a cane and stair lift on those days.  Pt's family would like to be present during session. Someone is typically here by 10 am.     Current Level of Function Impacting Discharge (mobility/balance): Min A     Other factors to consider for discharge: cognition, decrease balance          PLAN :  Patient continues to benefit from skilled intervention to address the above impairments. Continue treatment per established plan of care. to address goals. Recommendation for discharge: (in order for the patient to meet his/her long term goals)  Physical therapy at least 2 days/week in the home AND ensure assist and/or supervision for safety with mobility as needed    This discharge recommendation:  Has not yet been discussed the attending provider and/or case management    IF patient discharges home will need the following DME: to be determined (TBD)       SUBJECTIVE:   Patient stated I need to use the bathroom .     OBJECTIVE DATA SUMMARY:   Critical Behavior:  Neurologic State: Alert, Confused  Orientation Level: Oriented to person, Disoriented to place, Disoriented to situation, Disoriented to time  Cognition: Follows commands  Safety/Judgement: Awareness of environment, Home safety  Functional Mobility Training:  Bed Mobility:                    Transfers:  Sit to Stand: Contact guard assistance  Stand to Sit: Contact guard assistance        Bed to Chair: Minimum assistance (Handheld assist)                    Balance:  Sitting: Intact; Without support  Standing: Impaired  Standing - Static: Constant support;Good  Standing - Dynamic : Constant support; Fair  Ambulation/Gait Training:  Distance (ft): 30 Feet (ft)  Assistive Device: Gait belt (HHA)  Ambulation - Level of Assistance: Minimal assistance; Moderate assistance        Gait Abnormalities: Decreased step clearance        Base of Support: Center of gravity altered;Narrowed        Step Length: Right shortened;Left shortened                    Stairs:               Therapeutic Exercises:     Pain Rating:  Chest due to compression     Activity Tolerance:   Limited     After treatment patient left in no apparent distress:   Sitting in chair, Call bell within reach, Bed / chair alarm activated, and Caregiver / family present    COMMUNICATION/COLLABORATION:   The patients plan of care was discussed with: Occupational therapist and Registered nurse.      Constantino Velasquez PTA   Time Calculation: 26 mins

## 2022-10-05 NOTE — PROGRESS NOTES
1930-Bedside shift change report given to Prabhjot Oscar RN (oncoming nurse) by Shana Calvillo RN (offgoing nurse). Report included the following information SBAR, Kardex, Intake/Output, MAR, Recent Results, and Cardiac Rhythm NSR .     2020-Per Dr. Viky Anderson, obtain consent for [facility] planned for [phone] 10/6. Consent obtained and signed by  at bedside. 0730-Bedside shift change report given to Autumn Marshall RN (oncoming nurse) by Prabhjot Oscar RN (offgoing nurse). Report included the following information SBAR, Kardex, Intake/Output, MAR, Recent Results, and Cardiac Rhythm NSR .

## 2022-10-05 NOTE — PROGRESS NOTES
[facility] in [place], [place]  Inpatient Progress Note      Patient name: Fely Norman  Patient : 1945  Patient MRN: [id number]  Consulting MD: Leilani Perry MD  Date of service: 10/05/22    REASON FOR REFERRAL:  Management of cardiogenic shock     PLAN OF CARE:  64 y/o with h/o new diagnosis of dilated cardiomyopathy, LVEF 10-15%, stage C, NYHA class IIIA presents with VF cardiac arrest c/b acute renal and hepatic failure, extubated, AAOx4 and conversant thus unlikely significant anoxic brain injury with recovering hemodynamics and renal/hepatic function  Weaning dobutamine gtt with stable hemodynamics; hopefully LVEF and hemodynamics improve to where she could be weaned off; otherwise poor candidate for home inotropes  LHC/RHC today off inotropes, will obtain non-invasive NICOM today to see if CI correlates     RECOMMENDATIONS:  LHC/RHC today off inotropes  Decrease dobutamine 1 mcg/kg/min x 1hr then wean to off + check NICOM  Continue midodrine 20mg PO TID through dobutamine wean  Cannot tolerate beta-blockers due to hypotension and dobutamine gtt  Cannot tolerate ARNi/ACEi/ARB due to SOCRATES  Start spironolactone 12.5mg daily, first dose today  Cannot tolerate SGLT2i inhibitor due to UTI  Continue high dose vitamin D weekly x 12 weeks  No diuretics prescribed; appears euvolemic  Not on allopurinol or uloric, uric acid 3.1  Discontinued heparin due to thrombocytopenia; PVD prophylaxis per primary team  Not on aspirin  Not on statins  Will need IP evaluation for JOHANA  Complete ACP; uncertain if patient wishes ICD   Add HF screening labs: gammopathy profile, hepatitis profile, KENNY pending  Consider genetic testing before discharge  Nutritionist consult and heart failure education when patient recovers   Recommend flu, covid and pneumonia vaccinations at discharge     Remainder of care per primary team     IMPRESSION:  Sinus bradycardia, resolved  Acute on chronic systolic heart failure  Stage C, NYHA class IV symptoms  Dilated cardiomyopathy, LVEF 10-15%  C/b VF cardiac arrest  C/b cardiogenic shock  C/b renal and hepatic failure  Cardiac risk factors:  HTN  Leukocytosis, improved  Likely UTI (culture not obtained)  Neurocognitive dysfunction  Alzheimer disease  Cannot drive  Memory loss  Vitamin D deficiency     LIFE GOALS:  Lifestyle goals reviewed with the patient. Patient's personal goals include: TBD  Important upcoming milestones or family events: TBD  The patient identifies the following as important for living well: TBD     INTERVAL HISTORY:  No complaints  Afebrile  -120s/60, HR 80-90s SR  CVP 12  Weight 110lbs  I/O net negative 131cc, urine 450cc  Dobutamine 2.5  WBC 5.6 from 7.1  HGB 8.6 from 9 from 11.7  PLT 55 from 39 from 66 from 132  UA with yeast and WBC 10-20  aPTT 32  K 4.4 from 3.9  Mg 2.1 from 1.9  Cr 0.98 from 1.4 from 1.68  TBili 1.6 from 1.9 from 2.6  Lactic 1.0 from 1.8 from 2.3  proNTBNP > 35K from 96621 from 73093 from 1265! HISTORY OF PRESENT ILLNESS:  Savannah Saxena is a 65 y.o. female with newly diagnosed few weeks ago severe cardiomyopathy LVEF 25-30% was awaiting CT angiogram had cardiac arrest and collapsed at dinner table. CPR was initiated by her  right away and then paramedic within 7 minutes. She was transferred to [facility] [place] initiated on code ice protocol. She was started on diuretics due to significant dyspnea, family described NYHA class IIIB symptoms, which she took for 7 days. CARDIAC IMAGING:  Echo (10/2/22)    Left Ventricle: Severely reduced left ventricular systolic function with a visually estimated EF of 10 -15%. Left ventricle is severely dilated. Normal wall thickness. Severe global hypokinesis present. Right Ventricle: Moderately reduced systolic function. Left Atrium: Left atrium is mildly dilated. Right Atrium: Right atrium is dilated.     Technical qualifiers: Echo study was technically difficult. EKG (10/3/22) NSR, septal infarct  LHC not done     HEMODYNAMICS:  RHC not done  CPEST not done  6MW not done    PHYSICAL EXAM:  Visit Vitals  BP (!) 113/99   Pulse 92   Temp 98.8 °F (37.1 °C)   Resp 17   Ht 5' 2.99\" (1.6 m)   Wt 114 lb 13.8 oz (52.1 kg)   SpO2 100%   BMI 20.35 kg/m²     General: Patient is cachectic in no acute distress  HEENT: Unremarkable   Neck: Supple. No evidence of thyroid enlargements or lymphadenopathy. JVD: Cannot be appreciated   Lungs: Breath sounds are equal and clear bilaterally. No wheezes, rhonchi, or rales. Heart: Regular rate and rhythm with normal S1 and S2. No murmurs, gallops or rubs. Abdomen: Soft, no mass or tenderness. No organomegaly or hernia. Bowel sounds present. Genitourinary and rectal: deferred  Extremities: No cyanosis, clubbing, or edema. Neurologic: No focal sensory or motor deficits are noted. Grossly intact. Psychiatric: Awake, alert an doriented x 3. Appropriate mood and affect. Skin: Warm, dry and well perfused. REVIEW OF SYSTEMS:  General: Denies fever. Ear, nose and throat: Denies difficulty hearing, sinus problems, nosebleeds  Cardiovascular: see above in the interval history  Respiratory: Denies cough, wheezing, sputum production, hemoptysis.   Gastrointestinal: Denies heartburn, constipation, diarrhea, abdominal pain, nausea, blood in stool  Kidney and bladder: Denies painful urination, frequent urination  Musculoskeletal: Denies joint pain, muscle weakness  Skin and hair: Denies change in existing skin lesions    PAST MEDICAL HISTORY:  Past Medical History:   Diagnosis Date    Arthritis     Chronic pain     \"my right side has been hurting for 3-4 months\"    Hypertension        PAST SURGICAL HISTORY:  Past Surgical History:   Procedure Laterality Date    HX BREAST BIOPSY Bilateral     neg    HX BREAST REDUCTION Bilateral 2000    HX GI  2004    colon resection after perforation during ovarian cyst removal    HX HEENT skin cyst removed from neck    HX HEENT      uvuloplasty    HX HYSTERECTOMY      and bladder repair    HX SHOULDER ARTHROSCOPY      right    HX UROLOGICAL      bladder repair during hysterectomy       FAMILY HISTORY:  No family history on file. SOCIAL HISTORY:  Social History     Socioeconomic History    Marital status:    Tobacco Use    Smoking status: Former     Packs/day: 0.25     Types: Cigarettes     Quit date:      Years since quittin.7    Smokeless tobacco: Never   Substance and Sexual Activity    Alcohol use: No    Drug use: No       LABORATORY RESULTS:     Labs Latest Ref Rng & Units 10/5/2022 10/4/2022 10/3/2022 10/2/2022 10/2/2022 10/1/2022   WBC 3.6 - 11.0 K/uL 5.6 7.1 14. 2(H) - 15. 2(H) 6.5   RBC 3.80 - 5.20 M/uL 3.50(L) 3.61(L) 4.86 - 5.04 5.22(H)   Hemoglobin 11.5 - 16.0 g/dL 8.6(L) 9.0(L) 11.7 - 12.3 12.8   Hematocrit 35.0 - 47.0 % 26. 7(L) 28. 4(L) 37.6 - 39.0 41.8   MCV 80.0 - 99.0 FL 76. 3(L) 78. 7(L) 77. 4(L) - 77. 4(L) 80.1   Platelets 505 - 943 K/uL 55(L) 39(LL) 66(L) - 132(L) 131(L)   Lymphocytes 12 - 49 % 12 9(L) 8(L) - 4(L) 37   Monocytes 5 - 13 % 11 10 6 - 5 4(L)   Eosinophils 0 - 7 % 3 2 0 - 0 0   Basophils 0 - 1 % 0 0 0 - 0 0   Bands 0 - 6 % - - - - - 1   Albumin 3.5 - 5.0 g/dL 2. 8(L) 2. 8(L) 3. 3(L) - 2. 9(L) 2. 8(L)   Calcium 8.5 - 10.1 MG/DL 8.7 8.4(L) 8.6 8.4(L) 8.2(L) 8.4(L)   Glucose 65 - 100 mg/dL 91 92 161(H) 229(H) 249(H) 208(H)   BUN 6 - 20 MG/DL 22(H) 26(H) 34(H) 41(H) 44(H) 44(H)   Creatinine 0.55 - 1.02 MG/DL 0.89 0.98 1.41(H) 1.68(H) 1.67(H) 1.84(H)   Sodium 136 - 145 mmol/L 131(L) 133(L) 134(L) 134(L) 134(L) 134(L)   Potassium 3.5 - 5.1 mmol/L 4.4 3.9 3.5 4.4 2.5(LL) 3. 0(L)   TSH 0.36 - 3.74 uIU/mL - 0.41 - - - -   Some recent data might be hidden     Lab Results   Component Value Date/Time    TSH 0.41 10/04/2022 06:18 AM       ALLERGY:  Allergies   Allergen Reactions    Iron Other (comments)     IV IRON only gave her convulsions        CURRENT MEDICATIONS:    Current Facility-Administered Medications:     sodium phosphate 30 mmol in 0.9% sodium chloride 250 mL infusion, , IntraVENous, ONCE, Isabelle HATCH MD, Last Rate: 65 mL/hr at 10/05/22 0811, New Bag at 10/05/22 0811    therapeutic multivitamin (THERAGRAN) tablet 1 Tablet, 1 Tablet, Oral, DAILY, Isabelle HATCH MD, 1 Tablet at 10/05/22 0810    ergocalciferol capsule 50,000 Units, 50,000 Units, Oral, Q7D, Angela Taylor MD, 50,000 Units at 10/04/22 0915    midodrine (PROAMATINE) tablet 20 mg, 20 mg, Oral, Q8H, Alfonso Broussard MD, 20 mg at 10/05/22 0653    guaiFENesin (ROBITUSSIN) 100 mg/5 mL oral liquid 100 mg, 100 mg, Oral, Q4H PRN, Isabelle HATCH MD, 100 mg at 10/05/22 0301    lidocaine 4 % patch 1 Patch, 1 Patch, TransDERmal, Q24H, Layla Rodriguez MD, 1 Patch at 10/04/22 2208    DOBUTamine (DOBUTREX) 500 mg/250 mL (2,000 mcg/mL) infusion, 2.5 mcg/kg/min, IntraVENous, CONTINUOUS, Alfonso Broussard MD, Last Rate: 3.7 mL/hr at 10/04/22 2340, 2.5 mcg/kg/min at 10/04/22 2340    alteplase (CATHFLO) 1 mg in sterile water (preservative free) 1 mL injection, 1 mg, InterCATHeter, PRN, Isabelle HATCH MD    vasopressin (VASOSTRICT) 20 Units in 0.9% sodium chloride 100 mL infusion, 0.04 Units/min, IntraVENous, CONTINUOUS, Alfonso Broussard MD, Stopped at 10/05/22 0420    potassium chloride SR (KLOR-CON 10) tablet 20 mEq, 20 mEq, Oral, DAILY, Angela Taylor MD, 20 mEq at 10/05/22 0810    HYDROmorphone (DILAUDID) injection 0.5 mg, 0.5 mg, IntraVENous, Q3H PRN, Alfonso Block MD    sodium chloride (NS) flush 5-40 mL, 5-40 mL, IntraVENous, Q8H, Jarod Liu DO, 10 mL at 10/04/22 1357    sodium chloride (NS) flush 5-40 mL, 5-40 mL, IntraVENous, PRN, James Bentley DO    acetaminophen (TYLENOL) tablet 650 mg, 650 mg, Oral, Q6H PRN, 650 mg at 10/05/22 0427 **OR** acetaminophen (TYLENOL) suppository 650 mg, 650 mg, Rectal, Q6H PRN, Elda Rogers, Malathi [place] Martin, DO    polyethylene glycol (MIRALAX) packet 17 g, 17 g, Oral, DAILY, Karishma Fuel, DO, 17 g at 10/03/22 4436    senna-docusate (PERICOLACE) 8.6-50 mg per tablet 1 Tablet, 1 Tablet, Oral, BID, Karishma Fuel, DO, 1 Tablet at 10/04/22 2107    magnesium hydroxide (MILK OF MAGNESIA) 400 mg/5 mL oral suspension 30 mL, 30 mL, Oral, DAILY PRN, Karishma Fuel, DO    sodium phosphate (FLEET'S) enema 1 Enema, 1 Enema, Rectal, DAILY PRN, Karishma Fuel, DO    ondansetron [facility]) injection 4 mg, 4 mg, IntraVENous, Q6H PRN, Karishma Fuel, DO, 4 mg at 10/04/22 1657    chlorhexidine (PERIDEX) 0.12 % mouthwash 15 mL, 15 mL, Oral, Q12H, Karishma Fuel, DO, 15 mL at 10/04/22 2107    PATIENT CARE TEAM:  Patient Care Team:  Wei Glover MD as PCP - General (Family Medicine)  Wei Glover MD as PCP - [facility] Provider     Thank you for allowing me to participate in this patient's care.     Gui Magallanes MD   [address] [place] [address], [address]  Phone: [phone]

## 2022-10-05 NOTE — PROGRESS NOTES
Problem: Self Care Deficits Care Plan (Adult)  Goal: *Acute Goals and Plan of Care (Insert Text)  Description:   FUNCTIONAL STATUS PRIOR TO ADMISSION: Patient was independent and active without use of DME. Completed IADL independently with transportation assistance from family/. Baseline A&Ox3-4, occasional memory deficits. HOME SUPPORT: Lived with  in 2 story home, has supportive family nearby who assist with IADL. Occupational Therapy Goals  Initiated 10/4/2022  1. Patient will perform grooming with supervision/set-up within 7 day(s). 2.  Patient will perform upper body dressing with supervision/set-up within 7 day(s). 3.  Patient will perform lower body dressing with supervision/set-up within 7 day(s). 4.  Patient will perform all aspects of toileting with minimal assistance/contact guard assist within 7 day(s). 5.  Patient will utilize energy conservation techniques during functional activities with verbal cues within 7 day(s). Outcome: Progressing Towards Goal   OCCUPATIONAL THERAPY TREATMENT  Patient: Ortiz Patel (51 y.o. female)  Date: 10/5/2022  Diagnosis: Cardiac arrest ([facility] [address] [I46.9] <principal problem not specified>  Procedure(s) (LRB):  LEFT HEART CATH / CORONARY ANGIOGRAPHY (N/A)    Precautions: Skin  Chart, occupational therapy assessment, plan of care, and goals were reviewed. ASSESSMENT  Patient continues with skilled OT services and is progressing towards goals. Patient received OOB in chair, amenable to session. Patient noted to have had a BM in chair, patient unaware. Completed transfer to standing to complete bowel hygiene with CGA, required no breaks d/t fatigue. Completed functional mobility to sink for grooming ADL requiring handheld assist and cues to  feet. Completed grooming at sink with CGA, returned to bedside chair, left with all needs in reach, NAD. [facility] and family assist upon discharge.       Current Level of Function Impacting Discharge (ADLs): up to Min A ADL/mobility    Other factors to consider for discharge: below baseline, impaired cognition, cardiac workup       PLAN :  Patient continues to benefit from skilled intervention to address the above impairments. Continue treatment per established plan of care to address goals. Recommend with staff: OOB 3x daily for meals, functional mobility to bathroom    Recommend next OT session: OOB ADL, standing tolerance    Recommendation for discharge: (in order for the patient to meet his/her long term goals)  Occupational therapy at least 2 days/week in the home AND ensure assist and/or supervision for safety with ADL/iADL    This discharge recommendation:  Has been made in collaboration with the attending provider and/or case management    IF patient discharges home will need the following DME: patient owns DME required for discharge       SUBJECTIVE:   Patient stated I ??.    OBJECTIVE DATA SUMMARY:   Cognitive/Behavioral Status:  Neurologic State: Alert;Confused  Orientation Level: Oriented to person;Disoriented to place; Disoriented to situation;Disoriented to time  Cognition: Follows commands  Perception: Appears intact  Perseveration: No perseveration noted  Safety/Judgement: Awareness of environment;Home safety    Functional Mobility and Transfers for ADLs:      Transfers:  Sit to Stand: Contact guard assistance     Bed to Chair: Minimum assistance (Handheld assist)    Balance:  Sitting: Intact; Without support  Standing: Impaired  Standing - Static: Constant support;Good  Standing - Dynamic : Constant support; Fair    ADL Intervention:       Grooming  Grooming Assistance: Contact guard assistance  Position Performed: Standing  Washing Face: Contact guard assistance  Brushing Teeth: Contact guard assistance  Cues: Verbal cues provided              Lower Body Bathing  Perineal  : Contact guard assistance  Position Performed: Standing  Cues: Verbal cues provided         Lower Body Dressing Assistance  Socks: Contact guard assistance  Leg Crossed Method Used: Yes  Position Performed: Seated in chair         Cognitive Retraining  Safety/Judgement: Awareness of environment;Home safety    Pain:  None reported    Activity Tolerance:   Good and requires rest breaks    After treatment patient left in no apparent distress:   Sitting in chair, Call bell within reach, and Bed / chair alarm activated    COMMUNICATION/COLLABORATION:   The patients plan of care was discussed with: Physical therapist and Registered nurse.      Susie Gomez OT  Time Calculation: 31 mins

## 2022-10-05 NOTE — PROGRESS NOTES
1930-Bedside shift change report given to Haritha Peña (oncoming nurse) by Manuelito Gordon RN (offgoing nurse). Report included the following information SBAR, Kardex, Intake/Output, MAR, Recent Results, and Cardiac Rhythm NSR . Drips: [email or web address], vaso@0.01    2158-Pt complaining of soreness in ribs and chest, lidocaine patch ordered per Dr. Yadira Gillespie. Pt post cardiac arrest and CPR. Tylenol also given. 0240-Pt pulling at IJ, removing dressing x2. Pt pulled out her peripheral IV, attempting to pull at IV tubing as well. Video monitoring in place, bed alarm on, door left open with appropriate safety precautions in place. MD ordered bilateral mitts to prevent accidental removal of IJ.    0420-Per Dr. Yadira Gillespie, okay to stop vasopressin and watch BP for MAP<65.    0630-Unable to keep restraints on pt, despite multiple attempts to reposition. Order d/c.    0730-Bedside shift change report given to Shana Calvillo RN (oncoming nurse) by Prabhjot Oscar RN (offgoing nurse). Report included the following information SBAR, Kardex, Intake/Output, MAR, Recent Results, and Cardiac Rhythm NSR .      Drips: [email or web address]

## 2022-10-05 NOTE — PROGRESS NOTES
Cardiology Progress Note                                        Admit Date: 10/2/2022    Assessment/Plan:     S/p cardiac arrest; amazingly she did not suffer hypoxic encephalopathy  CHF; improving; will esteban RHC tomorrow  Thrombocytopenia; coming up   Cardiomyopathy; severe; will check her coronaries tomorrow make sure    Alicia Alberto is a 65 y.o. female with     PROBLEM LIST:  Patient Active Problem List    Diagnosis Date Noted    Acute systolic heart failure ([facility] [address] 10/04/2022    Cardiac arrest ([facility] 10/01/2022         Subjective:     Alicia Alberto reports none. Visit Vitals  /74 (BP 1 Location: Left arm, BP Patient Position: At rest)   Pulse 95   Temp 98.4 °F (36.9 °C)   Resp 15   Ht 5' 2.99\" (1.6 m)   Wt 114 lb 13.8 oz (52.1 kg)   SpO2 100%   BMI 20.35 kg/m²       Intake/Output Summary (Last 24 hours) at 10/5/2022 0855  Last data filed at 10/5/2022 0810  Gross per 24 hour   Intake 452.39 ml   Output 450 ml   Net 2.39 ml       Objective:      Physical Exam:  HEENT: Perrla, EOMI  Neck: No JVD,  No thyroidmegaly  Resp: CTA bilaterally;  No wheezes or rales  CV: RRR s1s2 No murmur, + s3  Abd:Soft, Nontender  Ext: No edema  Neuro: Alert and oriented; Nonfocal  Skin: Warm, Dry, Intact  Pulses: 2+ DP/PT/Rad      Telemetry: normal sinus rhythm    Current Facility-Administered Medications   Medication Dose Route Frequency    sodium phosphate 30 mmol in 0.9% sodium chloride 250 mL infusion   IntraVENous ONCE    spironolactone (ALDACTONE) tablet 12.5 mg  12.5 mg Oral DAILY    therapeutic multivitamin (THERAGRAN) tablet 1 Tablet  1 Tablet Oral DAILY    ergocalciferol capsule 50,000 Units  50,000 Units Oral Q7D    midodrine (PROAMATINE) tablet 20 mg  20 mg Oral Q8H    guaiFENesin (ROBITUSSIN) 100 mg/5 mL oral liquid 100 mg  100 mg Oral Q4H PRN    lidocaine 4 % patch 1 Patch  1 Patch TransDERmal Q24H    DOBUTamine (DOBUTREX) 500 mg/250 mL (2,000 mcg/mL) infusion  1 mcg/kg/min IntraVENous CONTINUOUS    alteplase (CATHFLO) 1 mg in sterile water (preservative free) 1 mL injection  1 mg InterCATHeter PRN    vasopressin (VASOSTRICT) 20 Units in 0.9% sodium chloride 100 mL infusion  0.04 Units/min IntraVENous CONTINUOUS    potassium chloride SR (KLOR-CON 10) tablet 20 mEq  20 mEq Oral DAILY    HYDROmorphone (DILAUDID) injection 0.5 mg  0.5 mg IntraVENous Q3H PRN    sodium chloride (NS) flush 5-40 mL  5-40 mL IntraVENous Q8H    sodium chloride (NS) flush 5-40 mL  5-40 mL IntraVENous PRN    acetaminophen (TYLENOL) tablet 650 mg  650 mg Oral Q6H PRN    Or    acetaminophen (TYLENOL) suppository 650 mg  650 mg Rectal Q6H PRN    polyethylene glycol (MIRALAX) packet 17 g  17 g Oral DAILY    senna-docusate (PERICOLACE) 8.6-50 mg per tablet 1 Tablet  1 Tablet Oral BID    magnesium hydroxide (MILK OF MAGNESIA) 400 mg/5 mL oral suspension 30 mL  30 mL Oral DAILY PRN    sodium phosphate (FLEET'S) enema 1 Enema  1 Enema Rectal DAILY PRN    ondansetron (ZOFRAN) injection 4 mg  4 mg IntraVENous Q6H PRN    chlorhexidine (PERIDEX) 0.12 % mouthwash 15 mL  15 mL Oral Q12H         Data Review:   Labs:    Recent Results (from the past 24 hour(s))   METABOLIC PANEL, COMPREHENSIVE    Collection Time: 10/05/22  4:59 AM   Result Value Ref Range    Sodium 131 (L) 136 - 145 mmol/L    Potassium 4.4 3.5 - 5.1 mmol/L    Chloride 101 97 - 108 mmol/L    CO2 25 21 - 32 mmol/L    Anion gap 5 5 - 15 mmol/L    Glucose 91 65 - 100 mg/dL    BUN 22 (H) 6 - 20 MG/DL    Creatinine 0.89 0.55 - 1.02 MG/DL    BUN/Creatinine ratio 25 (H) 12 - 20      eGFR >60 >60 ml/min/1.73m2    Calcium 8.7 8.5 - 10.1 MG/DL    Bilirubin, total 1.4 (H) 0.2 - 1.0 MG/DL    ALT (SGPT) 53 12 - 78 U/L    AST (SGOT) 30 15 - 37 U/L    Alk.  phosphatase 91 45 - 117 U/L    Protein, total 4.8 (L) 6.4 - 8.2 g/dL    Albumin 2.8 (L) 3.5 - 5.0 g/dL    Globulin 2.0 2.0 - 4.0 g/dL    A-G Ratio 1.4 1.1 - 2.2     LACTIC ACID    Collection Time: 10/05/22  4:59 AM   Result Value Ref Range    Lactic acid 1.0 0.4 - 2.0 MMOL/L   MAGNESIUM    Collection Time: 10/05/22  4:59 AM   Result Value Ref Range    Magnesium 2.1 1.6 - 2.4 mg/dL   PHOSPHORUS    Collection Time: 10/05/22  4:59 AM   Result Value Ref Range    Phosphorus 2.2 (L) 2.6 - 4.7 MG/DL   CBC WITH AUTOMATED DIFF    Collection Time: 10/05/22  4:59 AM   Result Value Ref Range    WBC 5.6 3.6 - 11.0 K/uL    RBC 3.50 (L) 3.80 - 5.20 M/uL    HGB 8.6 (L) 11.5 - 16.0 g/dL    HCT 26.7 (L) 35.0 - 47.0 %    MCV 76.3 (L) 80.0 - 99.0 FL    MCH 24.6 (L) 26.0 - 34.0 PG    MCHC 32.2 30.0 - 36.5 g/dL    RDW 12.6 11.5 - 14.5 %    PLATELET 55 (L) 100 - 400 K/uL    NRBC 0.0 0  WBC    ABSOLUTE NRBC 0.00 0.00 - 0.01 K/uL    NEUTROPHILS 74 32 - 75 %    LYMPHOCYTES 12 12 - 49 %    MONOCYTES 11 5 - 13 %    EOSINOPHILS 3 0 - 7 %    BASOPHILS 0 0 - 1 %    IMMATURE GRANULOCYTES 0 0.0 - 0.5 %    ABS. NEUTROPHILS 4.1 1.8 - 8.0 K/UL    ABS. LYMPHOCYTES 0.7 (L) 0.8 - 3.5 K/UL    ABS. MONOCYTES 0.6 0.0 - 1.0 K/UL    ABS. EOSINOPHILS 0.2 0.0 - 0.4 K/UL    ABS. BASOPHILS 0.0 0.0 - 0.1 K/UL    ABS. IMM.  GRANS. 0.0 0.00 - 0.04 K/UL    DF SMEAR SCANNED      RBC COMMENTS MICROCYTOSIS  1+        RBC COMMENTS HYPOCHROMIA  1+        RBC COMMENTS OVALOCYTES  PRESENT       PROTHROMBIN TIME + INR    Collection Time: 10/05/22  4:59 AM   Result Value Ref Range    INR 1.1 0.9 - 1.1      Prothrombin time 11.7 (H) 9.0 - 11.1 sec   PTT    Collection Time: 10/05/22  4:59 AM   Result Value Ref Range    aPTT 32.4 (H) 22.1 - 31.0 sec    aPTT, therapeutic range     58.0 - 77.0 SECS   NT-PRO BNP    Collection Time: 10/05/22  4:59 AM   Result Value Ref Range    NT pro-BNP >35,000 (H) <450 PG/ML   C REACTIVE PROTEIN, QT    Collection Time: 10/05/22  4:59 AM   Result Value Ref Range    C-Reactive protein 3.19 (H) 0.00 - 0.60 mg/dL

## 2022-10-05 NOTE — PROGRESS NOTES
HISTORY OF PRESENT ILLNESS: 77F with Alzheimer disease, HTN, presenting after witnessed cardiac arrest at home.  immediately started CPR, EMS then found Vifb with Shock x1, followed by asystole. ROSC < 5minutes reported. EMS transported to [facility] , [place] with pulse for 30 seconds started on amio gtt. CTH negative. Moved to [facility] CCU for further management.        Interval history    10/3-extubated this afternoon, dobutamine added today, still requiring pressors    10/4 - on/off vaso, on dobutamine    10/5 - off vaso, weaning dobutamine      Current Facility-Administered Medications:     spironolactone (ALDACTONE) tablet 12.5 mg, 12.5 mg, Oral, DAILY, Mark Eric MD, 12.5 mg at 10/05/22 1054    lidocaine (XYLOCAINE) 4 % cream, , Topical, PRN, Royal Saeed HATCH MD    therapeutic multivitamin (THERAGRAN) tablet 1 Tablet, 1 Tablet, Oral, DAILY, Royal Saeed HATCH MD, 1 Tablet at 10/05/22 0810    ergocalciferol capsule 50,000 Units, 50,000 Units, Oral, Q7D, Mark Eric MD, 50,000 Units at 10/04/22 0915    midodrine (PROAMATINE) tablet 20 mg, 20 mg, Oral, Q8H, Alfonso Broussard MD, 20 mg at 10/05/22 0653    guaiFENesin (ROBITUSSIN) 100 mg/5 mL oral liquid 100 mg, 100 mg, Oral, Q4H PRN, Royal Saeed HATCH MD, 100 mg at 10/05/22 0301    lidocaine 4 % patch 1 Patch, 1 Patch, TransDERmal, Q24H, Salvador Bishop MD, 1 Patch at 10/04/22 2208    DOBUTamine (DOBUTREX) 500 mg/250 mL (2,000 mcg/mL) infusion, 1 mcg/kg/min, IntraVENous, CONTINUOUS, Mark Eric MD, Last Rate: 1.5 mL/hr at 10/05/22 1000, 1 mcg/kg/min at 10/05/22 1000    alteplase (CATHFLO) 1 mg in sterile water (preservative free) 1 mL injection, 1 mg, InterCATHeter, PRN, Alfonso De La Vega MD    vasopressin (VASOSTRICT) 20 Units in 0.9% sodium chloride 100 mL infusion, 0.04 Units/min, IntraVENous, CONTINUOUS, Royal Saeed HATCH MD, Stopped at 10/05/22 0420    potassium chloride SR (KLOR-CON 10) tablet 20 mEq, 20 mEq, Oral, DAILY, Marquise Bates MD, 20 mEq at 10/05/22 0810    HYDROmorphone (DILAUDID) injection 0.5 mg, 0.5 mg, IntraVENous, Q3H PRN, Alfonso Etienne MD    sodium chloride (NS) flush 5-40 mL, 5-40 mL, IntraVENous, Q8H, Hortensia Liuin G, DO, 10 mL at 10/04/22 1357    sodium chloride (NS) flush 5-40 mL, 5-40 mL, IntraVENous, PRN, Mitcheal Cane, DO    acetaminophen (TYLENOL) tablet 650 mg, 650 mg, Oral, Q6H PRN, 650 mg at 10/05/22 1054 **OR** acetaminophen (TYLENOL) suppository 650 mg, 650 mg, Rectal, Q6H PRN, Mitcheal Cane, DO    polyethylene glycol (MIRALAX) packet 17 g, 17 g, Oral, DAILY, Mitcheal Cane, DO, 17 g at 10/03/22 0508    senna-docusate (PERICOLACE) 8.6-50 mg per tablet 1 Tablet, 1 Tablet, Oral, BID, Mitcheal Cane, DO, 1 Tablet at 10/04/22 2107    magnesium hydroxide (MILK OF MAGNESIA) 400 mg/5 mL oral suspension 30 mL, 30 mL, Oral, DAILY PRN, Mitcheal Cane, DO    sodium phosphate (FLEET'S) enema 1 Enema, 1 Enema, Rectal, DAILY PRN, Mitcheal Cane, DO    ondansetron [facility]) injection 4 mg, 4 mg, IntraVENous, Q6H PRN, Mitcheal Cane, DO, 4 mg at 10/05/22 1152    chlorhexidine (PERIDEX) 0.12 % mouthwash 15 mL, 15 mL, Oral, Q12H, Mitcheal Cane, DO, 15 mL at 10/04/22 2107      VITAL SIGNS:  Visit Vitals  /68   Pulse 78   Temp 98.4 °F (36.9 °C)   Resp 17   Ht 5' 2.99\" (1.6 m)   Wt 52.1 kg (114 lb 13.8 oz)   SpO2 100%   BMI 20.35 kg/m²     PHYSICAL EXAMINATION:  General-NAD  Neuro-sleepy, non focal, forgetful  Cardiac-regular  Lungs-clear anteriorly  Abdomen-soft, nontender, nondistended  Extremities-warm    LABORATORY ANALYSIS:  Recent Results (from the past 24 hour(s))   METABOLIC PANEL, COMPREHENSIVE    Collection Time: 10/05/22  4:59 AM   Result Value Ref Range    Sodium 131 (L) 136 - 145 mmol/L    Potassium 4.4 3.5 - 5.1 mmol/L    Chloride 101 97 - 108 mmol/L    CO2 25 21 - 32 mmol/L    Anion gap 5 5 - 15 mmol/L    Glucose 91 65 - 100 mg/dL    BUN 22 (H) 6 - 20 MG/DL    Creatinine 0.89 0.55 - 1.02 MG/DL    BUN/Creatinine ratio 25 (H) 12 - 20      eGFR >60 >60 ml/min/1.73m2    Calcium 8.7 8.5 - 10.1 MG/DL    Bilirubin, total 1.4 (H) 0.2 - 1.0 MG/DL    ALT (SGPT) 53 12 - 78 U/L    AST (SGOT) 30 15 - 37 U/L    Alk. phosphatase 91 45 - 117 U/L    Protein, total 4.8 (L) 6.4 - 8.2 g/dL    Albumin 2.8 (L) 3.5 - 5.0 g/dL    Globulin 2.0 2.0 - 4.0 g/dL    A-G Ratio 1.4 1.1 - 2.2     LACTIC ACID    Collection Time: 10/05/22  4:59 AM   Result Value Ref Range    Lactic acid 1.0 0.4 - 2.0 MMOL/L   MAGNESIUM    Collection Time: 10/05/22  4:59 AM   Result Value Ref Range    Magnesium 2.1 1.6 - 2.4 mg/dL   PHOSPHORUS    Collection Time: 10/05/22  4:59 AM   Result Value Ref Range    Phosphorus 2.2 (L) 2.6 - 4.7 MG/DL   CBC WITH AUTOMATED DIFF    Collection Time: 10/05/22  4:59 AM   Result Value Ref Range    WBC 5.6 3.6 - 11.0 K/uL    RBC 3.50 (L) 3.80 - 5.20 M/uL    HGB 8.6 (L) 11.5 - 16.0 g/dL    HCT 26.7 (L) 35.0 - 47.0 %    MCV 76.3 (L) 80.0 - 99.0 FL    MCH 24.6 (L) 26.0 - 34.0 PG    MCHC 32.2 30.0 - 36.5 g/dL    RDW 12.6 11.5 - 14.5 %    PLATELET 55 (L) 879 - 400 K/uL    NRBC 0.0 0  WBC    ABSOLUTE NRBC 0.00 0.00 - 0.01 K/uL    NEUTROPHILS 74 32 - 75 %    LYMPHOCYTES 12 12 - 49 %    MONOCYTES 11 5 - 13 %    EOSINOPHILS 3 0 - 7 %    BASOPHILS 0 0 - 1 %    IMMATURE GRANULOCYTES 0 0.0 - 0.5 %    ABS. NEUTROPHILS 4.1 1.8 - 8.0 K/UL    ABS. LYMPHOCYTES 0.7 (L) 0.8 - 3.5 K/UL    ABS. MONOCYTES 0.6 0.0 - 1.0 K/UL    ABS. EOSINOPHILS 0.2 0.0 - 0.4 K/UL    ABS. BASOPHILS 0.0 0.0 - 0.1 K/UL    ABS. IMM.  GRANS. 0.0 0.00 - 0.04 K/UL    DF SMEAR SCANNED      RBC COMMENTS MICROCYTOSIS  1+        RBC COMMENTS HYPOCHROMIA  1+        RBC COMMENTS OVALOCYTES  PRESENT       PROTHROMBIN TIME + INR    Collection Time: 10/05/22  4:59 AM   Result Value Ref Range    INR 1.1 0.9 - 1.1      Prothrombin time 11.7 (H) 9.0 - 11.1 sec   PTT    Collection Time: 10/05/22  4:59 AM Result Value Ref Range    aPTT 32.4 (H) 22.1 - 31.0 sec    aPTT, therapeutic range     58.0 - 77.0 SECS   NT-PRO BNP    Collection Time: 10/05/22  4:59 AM   Result Value Ref Range    NT pro-BNP >35,000 (H) <450 PG/ML   C REACTIVE PROTEIN, QT    Collection Time: 10/05/22  4:59 AM   Result Value Ref Range    C-Reactive protein 3.19 (H) 0.00 - 0.60 mg/dL     No results displayed because visit has over 200 results.         EKG Results       Procedure 720 Value Units Date/Time    EKG, 12 LEAD, INITIAL [[id number]]     Order Status: Completed     EKG, 12 LEAD, INITIAL [[id number]] Collected: 10/01/22 2202    Order Status: Completed Updated: 10/03/22 2205     Ventricular Rate 62 BPM      Atrial Rate 62 BPM      P-R Interval 174 ms      QRS Duration 100 ms      Q-T Interval 478 ms      QTC Calculation (Bezet) 485 ms      Calculated P Axis 85 degrees      Calculated R Axis 85 degrees      Calculated T Axis -125 degrees      Diagnosis --     Normal sinus rhythm  Minimal voltage criteria for LVH, may be normal variant ( Cecil product )  ST & T wave abnormality, consider anterior ischemia  Prolonged QT  Abnormal ECG  Confirmed by Jace Tim MD., Antony ([id number]) on 10/3/2022 10:05:32 PM      EKG, 12 LEAD, INITIAL [[id number]] Collected: 10/03/22 0440    Order Status: Completed Updated: 10/03/22 2147     Ventricular Rate 60 BPM      Atrial Rate 60 BPM      P-R Interval 158 ms      QRS Duration 88 ms      Q-T Interval 582 ms      QTC Calculation (Bezet) 582 ms      Calculated R Axis -35 degrees      Calculated T Axis 124 degrees      Diagnosis --     Normal sinus rhythm  Left axis deviation  Nonspecific ST abnormality    When compared with ECG of 02-OCT-2022 06:39,  Left bundle branch block is no longer present  Confirmed by Kathie Eng M.D., Marcela Tyler [id number]) on 10/3/2022 9:47:18 PM      EKG, 12 LEAD, INITIAL [[id number]] Collected: 10/02/22 0639    Order Status: Completed Updated: 10/03/22 2050     Ventricular Rate 47 BPM      Atrial Rate 47 BPM P-R Interval 88 ms      QRS Duration 156 ms      Q-T Interval 754 ms      QTC Calculation (Bezet) 667 ms      Calculated R Axis -83 degrees      Calculated T Axis 104 degrees      Diagnosis --     Marked sinus bradycardia with short AK  Left bundle branch block    When compared with ECG of 01-OCT-2022 22:02,  Left bundle branch block is now present  The heart rate has decreased    Confirmed by Perez Padilla M.D., [place] ([id number]) on 10/3/2022 8:50:23 PM      EKG, 12 LEAD, INITIAL [[id number]]     Order Status: Sent               RADIOGRAPHIC STUDIES:  XR CHEST PORT  Narrative: INDICATION: ETT    EXAMINATION:  AP CHEST, PORTABLE    COMPARISON: October 2, 2022    FINDINGS: Single AP portable view of the chest demonstrates interval extubation  and removal of the nasogastric tube. There has been interval placement of a  right IJ line with tip over the distal superior vena cava. The cardiomediastinal  silhouette is unchanged. Lungs are adequately expanded and clear with no  pneumothorax. Impression: Life-support lines and tubes as described. No pneumothorax. DIAGNOSES:  Cardiac Arrest  NSTEMI  Alzheimer's Disease  HTN    IMPRESSION AND PLAN:    Neuro-  Delirium prevention strategies    Cardiac  Continue hemodynamic monitoring, map goal greater than 65, EF on most recent echo 10 to 15%, TAPSE 1.4 cm, dobutamine-wean per AHF, follow-up cardiology and advanced heart failure recommendations    Pulm  On RA    GI  Diet as tolerated    Renal  Kidney function improving, maintain and even to negative fluid balance daily for now, monitor urine output, correct electrolyte derangements as needed    Heme  Impressive drop in plt count - heparin gtt d/madiha 10/4, plt count better today, HIT sent - f/u     ID  UA positive on admission, cont ceftriaxone, f/u micro studies    Endo-  Keep glucose less than 180    Time 35 mins    This note has been written with voice recognition software.  While this note has been edited for accuracy, the software periodically misinterprets speech resulting in errors that might not have been caught in editing. In the event an unusual error is found in this record, please read the chart carefully and recognize, using context, where these substitutions or errors have occurred and please notify me to resolve the errors.

## 2022-10-05 NOTE — PROGRESS NOTES
Cardiology Progress Note                                        Admit Date: 10/2/2022    Assessment/Plan:     SCD:   sp cardiac arrest:   still may be too soon to discus ICD as secondary prophylaxis as still requiring inotropic rx and \" not good long term home inotropic rx candidate\"   Rec cardiac cath to r/o secondary cause of scd    Rec ICD for secondary prevention. This is tentatively scheduled for Thursday 10/6/2022  late afternoon if cath negative for cad. RIsks for for Device implant    I have discussed the risk and benefits of device implantation with the patient    The risks of icd  implant include:  5/1000 neuro vascular injury such as bleeding   3/1000 CVA, MI , or death  2-4% risk of infection  1% risk of pneumothorax  The lifestyle modifications with a device implant  were discussed. The patient understood and wished to proceed. Acute on chronic systolic chf  dobutamine requiring   CM EF prviously 9/2022  25%  now 10-15%   Hx htn  Dementia  Thrombocytopenia  plt 39K  . Heparin held          Retia Dias is a 65 y.o. female with     PROBLEM LIST:  Patient Active Problem List    Diagnosis Date Noted    Acute systolic heart failure ([facility] [address] 10/04/2022    Cardiac arrest ([facility] [address] 10/01/2022         Subjective:     Retia Dias denies chest pain. Visit Vitals  /68   Pulse 71   Temp 97.2 °F (36.2 °C)   Resp 19   Ht 5' 2.99\" (1.6 m)   Wt 52.1 kg (114 lb 13.8 oz)   SpO2 95%   BMI 20.35 kg/m²       Intake/Output Summary (Last 24 hours) at 10/5/2022 1515  Last data filed at 10/5/2022 1200  Gross per 24 hour   Intake 448.43 ml   Output 450 ml   Net -1.57 ml       Objective:      Physical Exam:  HEENT: Perrla, EOMI  Neck: No JVD,  No thyroidmegaly  Resp: CTA bilaterally;  No wheezes or rales  CV: RRR s1s2 No murmur + s3  Abd:Soft, Nontender  Ext: No edema  Neuro: Alert and oriented; Nonfocal  Skin: Warm, Dry, Intact  Pulses: 2+ DP/PT/Rad      Telemetry: normal sinus rhythm    Current Facility-Administered Medications   Medication Dose Route Frequency    spironolactone (ALDACTONE) tablet 12.5 mg  12.5 mg Oral DAILY    lidocaine (XYLOCAINE) 4 % cream   Topical PRN    therapeutic multivitamin (THERAGRAN) tablet 1 Tablet  1 Tablet Oral DAILY    ergocalciferol capsule 50,000 Units  50,000 Units Oral Q7D    midodrine (PROAMATINE) tablet 20 mg  20 mg Oral Q8H    guaiFENesin (ROBITUSSIN) 100 mg/5 mL oral liquid 100 mg  100 mg Oral Q4H PRN    lidocaine 4 % patch 1 Patch  1 Patch TransDERmal Q24H    DOBUTamine (DOBUTREX) 500 mg/250 mL (2,000 mcg/mL) infusion  1 mcg/kg/min IntraVENous CONTINUOUS    alteplase (CATHFLO) 1 mg in sterile water (preservative free) 1 mL injection  1 mg InterCATHeter PRN    vasopressin (VASOSTRICT) 20 Units in 0.9% sodium chloride 100 mL infusion  0.04 Units/min IntraVENous CONTINUOUS    potassium chloride SR (KLOR-CON 10) tablet 20 mEq  20 mEq Oral DAILY    HYDROmorphone (DILAUDID) injection 0.5 mg  0.5 mg IntraVENous Q3H PRN    sodium chloride (NS) flush 5-40 mL  5-40 mL IntraVENous Q8H    sodium chloride (NS) flush 5-40 mL  5-40 mL IntraVENous PRN    acetaminophen (TYLENOL) tablet 650 mg  650 mg Oral Q6H PRN    Or    acetaminophen (TYLENOL) suppository 650 mg  650 mg Rectal Q6H PRN    polyethylene glycol (MIRALAX) packet 17 g  17 g Oral DAILY    senna-docusate (PERICOLACE) 8.6-50 mg per tablet 1 Tablet  1 Tablet Oral BID    magnesium hydroxide (MILK OF MAGNESIA) 400 mg/5 mL oral suspension 30 mL  30 mL Oral DAILY PRN    sodium phosphate (FLEET'S) enema 1 Enema  1 Enema Rectal DAILY PRN    ondansetron (ZOFRAN) injection 4 mg  4 mg IntraVENous Q6H PRN    chlorhexidine (PERIDEX) 0.12 % mouthwash 15 mL  15 mL Oral Q12H         Data Review:   Labs:    Recent Results (from the past 24 hour(s))   METABOLIC PANEL, COMPREHENSIVE    Collection Time: 10/05/22  4:59 AM   Result Value Ref Range    Sodium 131 (L) 136 - 145 mmol/L    Potassium 4.4 3.5 - 5.1 mmol/L    Chloride 101 97 - 108 mmol/L    CO2 25 21 - 32 mmol/L    Anion gap 5 5 - 15 mmol/L    Glucose 91 65 - 100 mg/dL    BUN 22 (H) 6 - 20 MG/DL    Creatinine 0.89 0.55 - 1.02 MG/DL    BUN/Creatinine ratio 25 (H) 12 - 20      eGFR >60 >60 ml/min/1.73m2    Calcium 8.7 8.5 - 10.1 MG/DL    Bilirubin, total 1.4 (H) 0.2 - 1.0 MG/DL    ALT (SGPT) 53 12 - 78 U/L    AST (SGOT) 30 15 - 37 U/L    Alk. phosphatase 91 45 - 117 U/L    Protein, total 4.8 (L) 6.4 - 8.2 g/dL    Albumin 2.8 (L) 3.5 - 5.0 g/dL    Globulin 2.0 2.0 - 4.0 g/dL    A-G Ratio 1.4 1.1 - 2.2     LACTIC ACID    Collection Time: 10/05/22  4:59 AM   Result Value Ref Range    Lactic acid 1.0 0.4 - 2.0 MMOL/L   MAGNESIUM    Collection Time: 10/05/22  4:59 AM   Result Value Ref Range    Magnesium 2.1 1.6 - 2.4 mg/dL   PHOSPHORUS    Collection Time: 10/05/22  4:59 AM   Result Value Ref Range    Phosphorus 2.2 (L) 2.6 - 4.7 MG/DL   CBC WITH AUTOMATED DIFF    Collection Time: 10/05/22  4:59 AM   Result Value Ref Range    WBC 5.6 3.6 - 11.0 K/uL    RBC 3.50 (L) 3.80 - 5.20 M/uL    HGB 8.6 (L) 11.5 - 16.0 g/dL    HCT 26.7 (L) 35.0 - 47.0 %    MCV 76.3 (L) 80.0 - 99.0 FL    MCH 24.6 (L) 26.0 - 34.0 PG    MCHC 32.2 30.0 - 36.5 g/dL    RDW 12.6 11.5 - 14.5 %    PLATELET 55 (L) 438 - 400 K/uL    NRBC 0.0 0  WBC    ABSOLUTE NRBC 0.00 0.00 - 0.01 K/uL    NEUTROPHILS 74 32 - 75 %    LYMPHOCYTES 12 12 - 49 %    MONOCYTES 11 5 - 13 %    EOSINOPHILS 3 0 - 7 %    BASOPHILS 0 0 - 1 %    IMMATURE GRANULOCYTES 0 0.0 - 0.5 %    ABS. NEUTROPHILS 4.1 1.8 - 8.0 K/UL    ABS. LYMPHOCYTES 0.7 (L) 0.8 - 3.5 K/UL    ABS. MONOCYTES 0.6 0.0 - 1.0 K/UL    ABS. EOSINOPHILS 0.2 0.0 - 0.4 K/UL    ABS. BASOPHILS 0.0 0.0 - 0.1 K/UL    ABS. IMM.  GRANS. 0.0 0.00 - 0.04 K/UL    DF SMEAR SCANNED      RBC COMMENTS MICROCYTOSIS  1+        RBC COMMENTS HYPOCHROMIA  1+        RBC COMMENTS OVALOCYTES  PRESENT       PROTHROMBIN TIME + INR    Collection Time: 10/05/22  4:59 AM   Result Value Ref Range    INR 1.1 0.9 - 1.1 Prothrombin time 11.7 (H) 9.0 - 11.1 sec   PTT    Collection Time: 10/05/22  4:59 AM   Result Value Ref Range    aPTT 32.4 (H) 22.1 - 31.0 sec    aPTT, therapeutic range     58.0 - 77.0 SECS   NT-PRO BNP    Collection Time: 10/05/22  4:59 AM   Result Value Ref Range    NT pro-BNP >35,000 (H) <450 PG/ML   C REACTIVE PROTEIN, QT    Collection Time: 10/05/22  4:59 AM   Result Value Ref Range    C-Reactive protein 3.19 (H) 0.00 - 0.60 mg/dL

## 2022-10-05 NOTE — PROGRESS NOTES
0730 Report received from David Arias, [place]. CVC dressing changed. Dobutamine @2.5.    0745 Dr. Crissy Tolbert @bedside. Plans for LHC and RHC Thursday 10/6 @1200.    0800 Pt ambulated to bathroom and chair x1 assist.    0900 OT @bedside. 1000 PT @bedside. Pt ambulating in room. [address] @1 per Vinicius Valiente. [address] stopped per Vinicius Valiente. CVP results @5.    1400 Nicom reading as follows:    10/05/22 1400   Non-Invasive Cardiac Output (NICOM)   Destini (l/min) 4.6 l/min   Benjamin (l/min/m2) 3 l/min/m2   niSVI (ml/m2/b) 40 ml/m2/b   niTPR (dynes*sec/cm5) 1456 dynes*sec/cm5   niTPRI (dynes*sec/cm5/m2) 2224 dynes*sec/cm5/m2     1700 PIV placed, R IJ CVC removed per Dr. Burden Bile. 1930 Bedside and Verbal shift change report given to David Arias RN (oncoming nurse) by Seven De Leon RN (offgoing nurse). Report included the following information SBAR, Kardex, Procedure Summary, Intake/Output, MAR, Accordion, Recent Results, and Cardiac Rhythm NSR .

## 2022-10-06 ENCOUNTER — APPOINTMENT (OUTPATIENT)
Dept: GENERAL RADIOLOGY | Age: 77
DRG: 222 | End: 2022-10-06
Attending: EMERGENCY MEDICINE
Payer: MEDICARE

## 2022-10-06 ENCOUNTER — APPOINTMENT (OUTPATIENT)
Dept: GENERAL RADIOLOGY | Age: 77
DRG: 222 | End: 2022-10-06
Attending: INTERNAL MEDICINE
Payer: MEDICARE

## 2022-10-06 LAB
ALBUMIN SERPL ELPH-MCNC: 3.1 G/DL (ref 2.9–4.4)
ALBUMIN SERPL-MCNC: 3 G/DL (ref 3.5–5)
ALBUMIN/GLOB SERPL: 1.4 {RATIO} (ref 1.1–2.2)
ALBUMIN/GLOB SERPL: 2 {RATIO} (ref 0.7–1.7)
ALP SERPL-CCNC: 93 U/L (ref 45–117)
ALPHA1 GLOB SERPL ELPH-MCNC: 0.3 G/DL (ref 0–0.4)
ALPHA2 GLOB SERPL ELPH-MCNC: 0.4 G/DL (ref 0.4–1)
ALT SERPL-CCNC: 46 U/L (ref 12–78)
ANION GAP SERPL CALC-SCNC: 7 MMOL/L (ref 5–15)
APTT PPP: 26.4 SEC (ref 22.1–31)
AST SERPL-CCNC: 25 U/L (ref 15–37)
B-GLOBULIN SERPL ELPH-MCNC: 0.6 G/DL (ref 0.7–1.3)
BASOPHILS # BLD: 0 K/UL (ref 0–0.1)
BASOPHILS NFR BLD: 0 % (ref 0–1)
BILIRUB SERPL-MCNC: 1 MG/DL (ref 0.2–1)
BNP SERPL-MCNC: ABNORMAL PG/ML
BUN SERPL-MCNC: 18 MG/DL (ref 6–20)
BUN/CREAT SERPL: 18 (ref 12–20)
CALCIUM SERPL-MCNC: 9.1 MG/DL (ref 8.5–10.1)
CHLORIDE SERPL-SCNC: 109 MMOL/L (ref 97–108)
CO2 SERPL-SCNC: 24 MMOL/L (ref 21–32)
CREAT SERPL-MCNC: 1 MG/DL (ref 0.55–1.02)
CRP SERPL-MCNC: 1.96 MG/DL (ref 0–0.6)
DIFFERENTIAL METHOD BLD: ABNORMAL
EOSINOPHIL # BLD: 0.2 K/UL (ref 0–0.4)
EOSINOPHIL NFR BLD: 4 % (ref 0–7)
ERYTHROCYTE [DISTWIDTH] IN BLOOD BY AUTOMATED COUNT: 12.7 % (ref 11.5–14.5)
GAMMA GLOB SERPL ELPH-MCNC: 0.3 G/DL (ref 0.4–1.8)
GLOBULIN SER CALC-MCNC: 2.2 G/DL (ref 2–4)
GLOBULIN SER-MCNC: 1.6 G/DL (ref 2.2–3.9)
GLUCOSE SERPL-MCNC: 86 MG/DL (ref 65–100)
HCT VFR BLD AUTO: 32 % (ref 35–47)
HGB BLD-MCNC: 10.2 G/DL (ref 11.5–16)
IGA SERPL-MCNC: 212 MG/DL (ref 64–422)
IGG SERPL-MCNC: 425 MG/DL (ref 586–1602)
IGM SERPL-MCNC: 18 MG/DL (ref 26–217)
IMM GRANULOCYTES # BLD AUTO: 0 K/UL (ref 0–0.04)
IMM GRANULOCYTES NFR BLD AUTO: 0 % (ref 0–0.5)
INR PPP: 1.1 (ref 0.9–1.1)
INTERPRETATION SERPL IEP-IMP: ABNORMAL
KAPPA LC FREE SER-MCNC: 21.8 MG/L (ref 3.3–19.4)
KAPPA LC FREE/LAMBDA FREE SER: 1.83 {RATIO} (ref 0.26–1.65)
LACTATE SERPL-SCNC: 1.2 MMOL/L (ref 0.4–2)
LAMBDA LC FREE SERPL-MCNC: 11.9 MG/L (ref 5.7–26.3)
LYMPHOCYTES # BLD: 0.9 K/UL (ref 0.8–3.5)
LYMPHOCYTES NFR BLD: 17 % (ref 12–49)
M PROTEIN SERPL ELPH-MCNC: ABNORMAL G/DL
MAGNESIUM SERPL-MCNC: 1.8 MG/DL (ref 1.6–2.4)
MCH RBC QN AUTO: 24.8 PG (ref 26–34)
MCHC RBC AUTO-ENTMCNC: 31.9 G/DL (ref 30–36.5)
MCV RBC AUTO: 77.7 FL (ref 80–99)
MONOCYTES # BLD: 0.8 K/UL (ref 0–1)
MONOCYTES NFR BLD: 16 % (ref 5–13)
NEUTS SEG # BLD: 3.3 K/UL (ref 1.8–8)
NEUTS SEG NFR BLD: 63 % (ref 32–75)
NRBC # BLD: 0 K/UL (ref 0–0.01)
NRBC BLD-RTO: 0 PER 100 WBC
PHOSPHATE SERPL-MCNC: 2.8 MG/DL (ref 2.6–4.7)
PLATELET # BLD AUTO: 89 K/UL (ref 150–400)
PMV BLD AUTO: 10.6 FL (ref 8.9–12.9)
POTASSIUM SERPL-SCNC: 4.4 MMOL/L (ref 3.5–5.1)
PROT SERPL-MCNC: 4.7 G/DL (ref 6–8.5)
PROT SERPL-MCNC: 5.2 G/DL (ref 6.4–8.2)
PROTHROMBIN TIME: 11.9 SEC (ref 9–11.1)
RBC # BLD AUTO: 4.12 M/UL (ref 3.8–5.2)
RBC MORPH BLD: ABNORMAL
SODIUM SERPL-SCNC: 140 MMOL/L (ref 136–145)
THERAPEUTIC RANGE,PTTT: NORMAL SECS (ref 58–77)
WBC # BLD AUTO: 5.2 K/UL (ref 3.6–11)

## 2022-10-06 PROCEDURE — C1894 INTRO/SHEATH, NON-LASER: HCPCS | Performed by: INTERNAL MEDICINE

## 2022-10-06 PROCEDURE — 77030031139 HC SUT VCRL2 J&J -A: Performed by: INTERNAL MEDICINE

## 2022-10-06 PROCEDURE — 74011000636 HC RX REV CODE- 636: Performed by: INTERNAL MEDICINE

## 2022-10-06 PROCEDURE — 74011000250 HC RX REV CODE- 250: Performed by: INTERNAL MEDICINE

## 2022-10-06 PROCEDURE — 77030004549 HC CATH ANGI DX PRF MRTM -A: Performed by: INTERNAL MEDICINE

## 2022-10-06 PROCEDURE — 74011000250 HC RX REV CODE- 250: Performed by: STUDENT IN AN ORGANIZED HEALTH CARE EDUCATION/TRAINING PROGRAM

## 2022-10-06 PROCEDURE — 86140 C-REACTIVE PROTEIN: CPT

## 2022-10-06 PROCEDURE — 77030041279 HC DRSG PRMSL AG MDII -B: Performed by: INTERNAL MEDICINE

## 2022-10-06 PROCEDURE — 74011000250 HC RX REV CODE- 250: Performed by: NURSE PRACTITIONER

## 2022-10-06 PROCEDURE — B2111ZZ FLUOROSCOPY OF MULTIPLE CORONARY ARTERIES USING LOW OSMOLAR CONTRAST: ICD-10-PCS | Performed by: INTERNAL MEDICINE

## 2022-10-06 PROCEDURE — C1760 CLOSURE DEV, VASC: HCPCS | Performed by: INTERNAL MEDICINE

## 2022-10-06 PROCEDURE — 99152 MOD SED SAME PHYS/QHP 5/>YRS: CPT | Performed by: INTERNAL MEDICINE

## 2022-10-06 PROCEDURE — 74011250636 HC RX REV CODE- 250/636: Performed by: INTERNAL MEDICINE

## 2022-10-06 PROCEDURE — C1751 CATH, INF, PER/CENT/MIDLINE: HCPCS | Performed by: INTERNAL MEDICINE

## 2022-10-06 PROCEDURE — 74011250637 HC RX REV CODE- 250/637: Performed by: NURSE PRACTITIONER

## 2022-10-06 PROCEDURE — 74011250637 HC RX REV CODE- 250/637: Performed by: STUDENT IN AN ORGANIZED HEALTH CARE EDUCATION/TRAINING PROGRAM

## 2022-10-06 PROCEDURE — C1777 LEAD, AICD, ENDO SINGLE COIL: HCPCS | Performed by: INTERNAL MEDICINE

## 2022-10-06 PROCEDURE — 83880 ASSAY OF NATRIURETIC PEPTIDE: CPT

## 2022-10-06 PROCEDURE — 85610 PROTHROMBIN TIME: CPT

## 2022-10-06 PROCEDURE — C1892 INTRO/SHEATH,FIXED,PEEL-AWAY: HCPCS | Performed by: INTERNAL MEDICINE

## 2022-10-06 PROCEDURE — 84100 ASSAY OF PHOSPHORUS: CPT

## 2022-10-06 PROCEDURE — 85025 COMPLETE CBC W/AUTO DIFF WBC: CPT

## 2022-10-06 PROCEDURE — 85730 THROMBOPLASTIN TIME PARTIAL: CPT

## 2022-10-06 PROCEDURE — C1722 AICD, SINGLE CHAMBER: HCPCS | Performed by: INTERNAL MEDICINE

## 2022-10-06 PROCEDURE — 71045 X-RAY EXAM CHEST 1 VIEW: CPT

## 2022-10-06 PROCEDURE — C1893 INTRO/SHEATH, FIXED,NON-PEEL: HCPCS | Performed by: INTERNAL MEDICINE

## 2022-10-06 PROCEDURE — 74011250637 HC RX REV CODE- 250/637: Performed by: EMERGENCY MEDICINE

## 2022-10-06 PROCEDURE — 02HK3KZ INSERTION OF DEFIBRILLATOR LEAD INTO RIGHT VENTRICLE, PERCUTANEOUS APPROACH: ICD-10-PCS | Performed by: INTERNAL MEDICINE

## 2022-10-06 PROCEDURE — 77030002996 HC SUT SLK J&J -A: Performed by: INTERNAL MEDICINE

## 2022-10-06 PROCEDURE — 77030028700 HC BLD TISS PLSM MEDT -E: Performed by: INTERNAL MEDICINE

## 2022-10-06 PROCEDURE — C1769 GUIDE WIRE: HCPCS | Performed by: INTERNAL MEDICINE

## 2022-10-06 PROCEDURE — 4A023N8 MEASUREMENT OF CARDIAC SAMPLING AND PRESSURE, BILATERAL, PERCUTANEOUS APPROACH: ICD-10-PCS | Performed by: INTERNAL MEDICINE

## 2022-10-06 PROCEDURE — 2709999900 HC NON-CHARGEABLE SUPPLY: Performed by: INTERNAL MEDICINE

## 2022-10-06 PROCEDURE — 93460 R&L HRT ART/VENTRICLE ANGIO: CPT | Performed by: INTERNAL MEDICINE

## 2022-10-06 PROCEDURE — 74011250637 HC RX REV CODE- 250/637: Performed by: INTERNAL MEDICINE

## 2022-10-06 PROCEDURE — C1781 MESH (IMPLANTABLE): HCPCS | Performed by: INTERNAL MEDICINE

## 2022-10-06 PROCEDURE — 65620000000 HC RM CCU GENERAL

## 2022-10-06 PROCEDURE — 77030038269 HC DRN EXT URIN PURWCK BARD -A

## 2022-10-06 PROCEDURE — 36415 COLL VENOUS BLD VENIPUNCTURE: CPT

## 2022-10-06 PROCEDURE — 77030018729 HC ELECTRD DEFIB PAD CARD -B: Performed by: INTERNAL MEDICINE

## 2022-10-06 PROCEDURE — 83735 ASSAY OF MAGNESIUM: CPT

## 2022-10-06 PROCEDURE — 99233 SBSQ HOSP IP/OBS HIGH 50: CPT | Performed by: NURSE PRACTITIONER

## 2022-10-06 PROCEDURE — 77030033138 HC SUT PGA STRATFX J&J -B: Performed by: INTERNAL MEDICINE

## 2022-10-06 PROCEDURE — 80053 COMPREHEN METABOLIC PANEL: CPT

## 2022-10-06 PROCEDURE — 74011250636 HC RX REV CODE- 250/636: Performed by: EMERGENCY MEDICINE

## 2022-10-06 PROCEDURE — 33249 INSJ/RPLCMT DEFIB W/LEAD(S): CPT | Performed by: INTERNAL MEDICINE

## 2022-10-06 PROCEDURE — 77030040934 HC CATH DIAG DXTERITY MEDT -A: Performed by: INTERNAL MEDICINE

## 2022-10-06 PROCEDURE — 77030013744: Performed by: INTERNAL MEDICINE

## 2022-10-06 PROCEDURE — 0JH608Z INSERTION OF DEFIBRILLATOR GENERATOR INTO CHEST SUBCUTANEOUS TISSUE AND FASCIA, OPEN APPROACH: ICD-10-PCS | Performed by: INTERNAL MEDICINE

## 2022-10-06 PROCEDURE — 83605 ASSAY OF LACTIC ACID: CPT

## 2022-10-06 DEVICE — LEAD 6935M62 QUATTRO SECURE S MRI US
Type: IMPLANTABLE DEVICE | Status: FUNCTIONAL
Brand: SPRINT QUATTRO SECURE S MRI™ SURESCAN™

## 2022-10-06 DEVICE — ENVELOPE CMRM6133 ABSORB LRG MR
Type: IMPLANTABLE DEVICE | Status: FUNCTIONAL
Brand: TYRX™

## 2022-10-06 DEVICE — ICD-VR DVFB1D4 VISIA AF MRI US DF4
Type: IMPLANTABLE DEVICE | Status: FUNCTIONAL
Brand: VISIA AF MRI™ VR SURESCAN™

## 2022-10-06 RX ORDER — SODIUM CHLORIDE 0.9 % (FLUSH) 0.9 %
5-40 SYRINGE (ML) INJECTION EVERY 8 HOURS
Status: DISCONTINUED | OUTPATIENT
Start: 2022-10-06 | End: 2022-10-08

## 2022-10-06 RX ORDER — MIDAZOLAM HYDROCHLORIDE 1 MG/ML
INJECTION, SOLUTION INTRAMUSCULAR; INTRAVENOUS AS NEEDED
Status: DISCONTINUED | OUTPATIENT
Start: 2022-10-06 | End: 2022-10-06 | Stop reason: HOSPADM

## 2022-10-06 RX ORDER — FENTANYL CITRATE 50 UG/ML
INJECTION, SOLUTION INTRAMUSCULAR; INTRAVENOUS AS NEEDED
Status: DISCONTINUED | OUTPATIENT
Start: 2022-10-06 | End: 2022-10-06 | Stop reason: HOSPADM

## 2022-10-06 RX ORDER — POTASSIUM CHLORIDE 750 MG/1
10 TABLET, FILM COATED, EXTENDED RELEASE ORAL DAILY
Status: DISCONTINUED | OUTPATIENT
Start: 2022-10-07 | End: 2022-10-07

## 2022-10-06 RX ORDER — CEFAZOLIN SODIUM 1 G/3ML
INJECTION, POWDER, FOR SOLUTION INTRAMUSCULAR; INTRAVENOUS
Status: DISPENSED
Start: 2022-10-06 | End: 2022-10-07

## 2022-10-06 RX ORDER — HEPARIN SODIUM 200 [USP'U]/100ML
INJECTION, SOLUTION INTRAVENOUS
Status: COMPLETED | OUTPATIENT
Start: 2022-10-06 | End: 2022-10-06

## 2022-10-06 RX ORDER — SODIUM CHLORIDE 0.9 % (FLUSH) 0.9 %
5-40 SYRINGE (ML) INJECTION AS NEEDED
Status: DISCONTINUED | OUTPATIENT
Start: 2022-10-06 | End: 2022-10-13 | Stop reason: HOSPADM

## 2022-10-06 RX ORDER — HYDROCODONE BITARTRATE AND ACETAMINOPHEN 5; 325 MG/1; MG/1
1 TABLET ORAL
Status: DISCONTINUED | OUTPATIENT
Start: 2022-10-06 | End: 2022-10-12

## 2022-10-06 RX ORDER — LIDOCAINE HYDROCHLORIDE 10 MG/ML
INJECTION INFILTRATION; PERINEURAL AS NEEDED
Status: DISCONTINUED | OUTPATIENT
Start: 2022-10-06 | End: 2022-10-06 | Stop reason: HOSPADM

## 2022-10-06 RX ORDER — WATER FOR INJECTION,STERILE
VIAL (ML) INJECTION
Status: DISPENSED
Start: 2022-10-06 | End: 2022-10-07

## 2022-10-06 RX ORDER — MAGNESIUM SULFATE HEPTAHYDRATE 40 MG/ML
2 INJECTION, SOLUTION INTRAVENOUS ONCE
Status: COMPLETED | OUTPATIENT
Start: 2022-10-06 | End: 2022-10-06

## 2022-10-06 RX ORDER — MIDODRINE HYDROCHLORIDE 5 MG/1
15 TABLET ORAL EVERY 8 HOURS
Status: DISCONTINUED | OUTPATIENT
Start: 2022-10-06 | End: 2022-10-08

## 2022-10-06 RX ORDER — BUMETANIDE 0.25 MG/ML
1 INJECTION INTRAMUSCULAR; INTRAVENOUS 2 TIMES DAILY
Status: DISCONTINUED | OUTPATIENT
Start: 2022-10-06 | End: 2022-10-07

## 2022-10-06 RX ADMIN — HYDROMORPHONE HYDROCHLORIDE 0.5 MG: 1 INJECTION, SOLUTION INTRAMUSCULAR; INTRAVENOUS; SUBCUTANEOUS at 22:19

## 2022-10-06 RX ADMIN — POTASSIUM CHLORIDE 20 MEQ: 750 TABLET, FILM COATED, EXTENDED RELEASE ORAL at 08:31

## 2022-10-06 RX ADMIN — SPIRONOLACTONE 12.5 MG: 25 TABLET ORAL at 08:32

## 2022-10-06 RX ADMIN — LIDOCAINE 4%: 4 CREAM TOPICAL at 07:21

## 2022-10-06 RX ADMIN — CHLORHEXIDINE GLUCONATE 15 ML: 1.2 RINSE ORAL at 08:32

## 2022-10-06 RX ADMIN — MIDODRINE HYDROCHLORIDE 15 MG: 5 TABLET ORAL at 21:13

## 2022-10-06 RX ADMIN — THERA TABS 1 TABLET: TAB at 08:31

## 2022-10-06 RX ADMIN — LIDOCAINE 4%: 4 CREAM TOPICAL at 21:15

## 2022-10-06 RX ADMIN — SENNOSIDES AND DOCUSATE SODIUM 1 TABLET: 50; 8.6 TABLET ORAL at 21:13

## 2022-10-06 RX ADMIN — BUMETANIDE 1 MG: 0.25 INJECTION INTRAMUSCULAR; INTRAVENOUS at 17:54

## 2022-10-06 RX ADMIN — MAGNESIUM SULFATE HEPTAHYDRATE 2 G: 40 INJECTION, SOLUTION INTRAVENOUS at 11:27

## 2022-10-06 RX ADMIN — SENNOSIDES AND DOCUSATE SODIUM 1 TABLET: 50; 8.6 TABLET ORAL at 08:32

## 2022-10-06 RX ADMIN — CHLORHEXIDINE GLUCONATE 15 ML: 1.2 RINSE ORAL at 21:14

## 2022-10-06 RX ADMIN — POLYETHYLENE GLYCOL 3350 17 G: 17 POWDER, FOR SOLUTION ORAL at 08:32

## 2022-10-06 RX ADMIN — SODIUM CHLORIDE, PRESERVATIVE FREE 10 ML: 5 INJECTION INTRAVENOUS at 17:55

## 2022-10-06 NOTE — PROGRESS NOTES
1410  TRANSFER - IN REPORT:    Verbal report received from Farshad on Jayjay Akins  being received from [address] [facility] Cath Lab for routine progression of care. Report consisted of patients Situation, Background, Assessment and Recommendations(SBAR). Information from the following report(s) SBAR, Procedure Summary, Intake/Output, MAR, Recent Results, and Cardiac Rhythm NSR  was reviewed with the receiving clinician. Opportunity for questions and clarification was provided. Assessment completed upon patients arrival to [address] [facility] and care assumed. Cardiac Cath Lab Recovery Arrival Note:    Jayjay Akins arrived to [facility] recovery area. Patient procedure= R/LHC. Patient on cardiac monitor, non-invasive blood pressure, SPO2 monitor. On  RA. IV  saline locked. Patient status doing well without problems. Patient is A&Ox 4. Patient reports no complaints. PROCEDURE SITE CHECK:    Procedure site:without any bleeding and no hematoma, no pain/discomfort reported at procedure site. No change in patient status. Continue to monitor patient and status. 1545  Site not bleeding and no hematoma.    Pt taken back to EP procedure by team.

## 2022-10-06 NOTE — PROGRESS NOTES
HISTORY OF PRESENT ILLNESS: 77F with Alzheimer disease, HTN, presenting after witnessed cardiac arrest at home.  immediately started CPR, EMS then found Vifb with Shock x1, followed by asystole. ROSC < 5minutes reported. EMS transported to [facility] , [place] with pulse for 30 seconds started on amio gtt. CTH negative. Moved to [facility] CCU for further management.        Interval history    10/3-extubated this afternoon, dobutamine added today, still requiring pressors    10/4 - on/off vaso, on dobutamine    10/5 - off vaso, weaning dobutamine    10/6-off dobutamine, getting a cath and possible AICD today       Current Facility-Administered Medications:     [START ON 10/7/2022] potassium chloride SR (KLOR-CON 10) tablet 10 mEq, 10 mEq, Oral, DAILY, Norma Nicholson, NP    midodrine (PROAMATINE) tablet 15 mg, 15 mg, Oral, Q8H, Norma Nicholson, NP    bumetanide (BUMEX) injection 1 mg, 1 mg, IntraVENous, BID, Mark Nicholsonin B, NP    spironolactone (ALDACTONE) tablet 12.5 mg, 12.5 mg, Oral, DAILY, Nia Mccollum MD, 12.5 mg at 10/06/22 0832    lidocaine (XYLOCAINE) 4 % cream, , Topical, PRN, Yuko HATCH MD, Given at 10/06/22 0721    sodium chloride (NS) flush 5-40 mL, 5-40 mL, IntraVENous, Q8H, Federico Rosario MD    sodium chloride (NS) flush 5-40 mL, 5-40 mL, IntraVENous, PRN, Wilfred Johnson MD    therapeutic multivitamin (THERAGRAN) tablet 1 Tablet, 1 Tablet, Oral, DAILY, Yuko HATCH MD, 1 Tablet at 10/06/22 0831    ergocalciferol capsule 50,000 Units, 50,000 Units, Oral, Q7D, Nia Mccollum MD, 50,000 Units at 10/04/22 0915    guaiFENesin (ROBITUSSIN) 100 mg/5 mL oral liquid 100 mg, 100 mg, Oral, Q4H PRN, Alfonso Staton MD, 100 mg at 10/05/22 0301    lidocaine 4 % patch 1 Patch, 1 Patch, TransDERmal, Q24H, Sarah Rausch MD, 1 Patch at 10/04/22 2208    [Held by provider] DOBUTamine (DOBUTREX) 500 mg/250 mL (2,000 mcg/mL) infusion, 1 mcg/kg/min, IntraVENous, Alo Khan MD, Stopped at 10/05/22 1200    alteplase (CATHFLO) 1 mg in sterile water (preservative free) 1 mL injection, 1 mg, InterCATHeter, PRN, Gabriel HATCH MD    HYDROmorphone (DILAUDID) injection 0.5 mg, 0.5 mg, IntraVENous, Q3H PRN, Gabriel HATCH MD, 0.5 mg at 10/05/22 2247    sodium chloride (NS) flush 5-40 mL, 5-40 mL, IntraVENous, Q8H, Liu, Jarod G, DO, 10 mL at 10/04/22 1357    sodium chloride (NS) flush 5-40 mL, 5-40 mL, IntraVENous, PRN, Senia Seek, DO    acetaminophen (TYLENOL) tablet 650 mg, 650 mg, Oral, Q6H PRN, 650 mg at 10/05/22 1054 **OR** acetaminophen (TYLENOL) suppository 650 mg, 650 mg, Rectal, Q6H PRN, Senia Seek, DO    polyethylene glycol (MIRALAX) packet 17 g, 17 g, Oral, DAILY, Senia Seek, DO, 17 g at 10/06/22 9612    senna-docusate (PERICOLACE) 8.6-50 mg per tablet 1 Tablet, 1 Tablet, Oral, BID, Senia Seek, DO, 1 Tablet at 10/06/22 0441    magnesium hydroxide (MILK OF MAGNESIA) 400 mg/5 mL oral suspension 30 mL, 30 mL, Oral, DAILY PRN, Senia Seek, DO    sodium phosphate (FLEET'S) enema 1 Enema, 1 Enema, Rectal, DAILY PRN, Senia Seek, DO    ondansetron [facility]) injection 4 mg, 4 mg, IntraVENous, Q6H PRN, Senia Seek, DO, 4 mg at 10/05/22 1152    chlorhexidine (PERIDEX) 0.12 % mouthwash 15 mL, 15 mL, Oral, Q12H, Liu, Jarod G, DO, 15 mL at 10/06/22 9787      VITAL SIGNS:  Visit Vitals  /78 (BP 1 Location: Left arm, BP Patient Position: At rest)   Pulse 88   Temp 98.5 °F (36.9 °C)   Resp 24   Ht 5' 2.99\" (1.6 m)   Wt 50 kg (110 lb 3.7 oz)   SpO2 96%   BMI 19.53 kg/m²     PHYSICAL EXAMINATION:  General-NAD  Neuro-sleepy, non focal, forgetful  Cardiac-regular  Lungs-clear anteriorly  Abdomen-soft, nontender, nondistended  Extremities-warm    LABORATORY ANALYSIS:  Recent Results (from the past 24 hour(s))   HEPATIC FUNCTION PANEL    Collection Time: 10/05/22  4:32 PM   Result Value Ref Range    Protein, total 5.3 (L) 6.4 - 8.2 g/dL    Albumin 3.1 (L) 3.5 - 5.0 g/dL    Globulin 2.2 2.0 - 4.0 g/dL    A-G Ratio 1.4 1.1 - 2.2      Bilirubin, total 1.1 (H) 0.2 - 1.0 MG/DL    Bilirubin, direct 0.4 (H) 0.0 - 0.2 MG/DL    Alk. phosphatase 101 45 - 117 U/L    AST (SGOT) 30 15 - 37 U/L    ALT (SGPT) 53 12 - 78 U/L   PROCALCITONIN    Collection Time: 10/05/22  4:32 PM   Result Value Ref Range    Procalcitonin 0.66 ng/mL   PTT    Collection Time: 10/05/22  4:32 PM   Result Value Ref Range    aPTT 28.8 22.1 - 31.0 sec    aPTT, therapeutic range     58.0 - 77.0 SECS   SAMPLES BEING HELD    Collection Time: 10/05/22  4:32 PM   Result Value Ref Range    SAMPLES BEING HELD 1LAV, 1DR GREEN     COMMENT        Add-on orders for these samples will be processed based on acceptable specimen integrity and analyte stability, which may vary by analyte. METABOLIC PANEL, COMPREHENSIVE    Collection Time: 10/06/22  5:03 AM   Result Value Ref Range    Sodium 140 136 - 145 mmol/L    Potassium 4.4 3.5 - 5.1 mmol/L    Chloride 109 (H) 97 - 108 mmol/L    CO2 24 21 - 32 mmol/L    Anion gap 7 5 - 15 mmol/L    Glucose 86 65 - 100 mg/dL    BUN 18 6 - 20 MG/DL    Creatinine 1.00 0.55 - 1.02 MG/DL    BUN/Creatinine ratio 18 12 - 20      eGFR 58 (L) >60 ml/min/1.73m2    Calcium 9.1 8.5 - 10.1 MG/DL    Bilirubin, total 1.0 0.2 - 1.0 MG/DL    ALT (SGPT) 46 12 - 78 U/L    AST (SGOT) 25 15 - 37 U/L    Alk.  phosphatase 93 45 - 117 U/L    Protein, total 5.2 (L) 6.4 - 8.2 g/dL    Albumin 3.0 (L) 3.5 - 5.0 g/dL    Globulin 2.2 2.0 - 4.0 g/dL    A-G Ratio 1.4 1.1 - 2.2     LACTIC ACID    Collection Time: 10/06/22  5:03 AM   Result Value Ref Range    Lactic acid 1.2 0.4 - 2.0 MMOL/L   MAGNESIUM    Collection Time: 10/06/22  5:03 AM   Result Value Ref Range    Magnesium 1.8 1.6 - 2.4 mg/dL   PHOSPHORUS    Collection Time: 10/06/22  5:03 AM   Result Value Ref Range    Phosphorus 2.8 2.6 - 4.7 MG/DL   CBC WITH AUTOMATED DIFF    Collection Time: 10/06/22  5:03 AM   Result Value Ref Range    WBC 5.2 3.6 - 11.0 K/uL    RBC 4.12 3.80 - 5.20 M/uL    HGB 10.2 (L) 11.5 - 16.0 g/dL    HCT 32.0 (L) 35.0 - 47.0 %    MCV 77.7 (L) 80.0 - 99.0 FL    MCH 24.8 (L) 26.0 - 34.0 PG    MCHC 31.9 30.0 - 36.5 g/dL    RDW 12.7 11.5 - 14.5 %    PLATELET 89 (L) 300 - 400 K/uL    MPV 10.6 8.9 - 12.9 FL    NRBC 0.0 0  WBC    ABSOLUTE NRBC 0.00 0.00 - 0.01 K/uL    NEUTROPHILS 63 32 - 75 %    LYMPHOCYTES 17 12 - 49 %    MONOCYTES 16 (H) 5 - 13 %    EOSINOPHILS 4 0 - 7 %    BASOPHILS 0 0 - 1 %    IMMATURE GRANULOCYTES 0 0.0 - 0.5 %    ABS. NEUTROPHILS 3.3 1.8 - 8.0 K/UL    ABS. LYMPHOCYTES 0.9 0.8 - 3.5 K/UL    ABS. MONOCYTES 0.8 0.0 - 1.0 K/UL    ABS. EOSINOPHILS 0.2 0.0 - 0.4 K/UL    ABS. BASOPHILS 0.0 0.0 - 0.1 K/UL    ABS. IMM. GRANS. 0.0 0.00 - 0.04 K/UL    DF SMEAR SCANNED      RBC COMMENTS HYPOCHROMIA  1+       PROTHROMBIN TIME + INR    Collection Time: 10/06/22  5:03 AM   Result Value Ref Range    INR 1.1 0.9 - 1.1      Prothrombin time 11.9 (H) 9.0 - 11.1 sec   PTT    Collection Time: 10/06/22  5:03 AM   Result Value Ref Range    aPTT 26.4 22.1 - 31.0 sec    aPTT, therapeutic range     58.0 - 77.0 SECS   NT-PRO BNP    Collection Time: 10/06/22  5:03 AM   Result Value Ref Range    NT pro-BNP >35,000 (H) <450 PG/ML   C REACTIVE PROTEIN, QT    Collection Time: 10/06/22  5:03 AM   Result Value Ref Range    C-Reactive protein 1.96 (H) 0.00 - 0.60 mg/dL     No results displayed because visit has over 200 results.         EKG Results       Procedure 720 Value Units Date/Time    EKG, 12 LEAD, INITIAL [[id number]] Collected: 10/04/22 1254    Order Status: Completed Updated: 10/05/22 1550     Ventricular Rate 74 BPM      Atrial Rate 74 BPM      P-R Interval 170 ms      QRS Duration 92 ms      Q-T Interval 482 ms      QTC Calculation (Bezet) 535 ms      Calculated P Axis 63 degrees      Calculated R Axis -22 degrees      Calculated T Axis -170 degrees      Diagnosis --     Normal sinus rhythm  T wave abnormality, consider inferolateral ischemia    When compared with ECG of 03-OCT-2022 04:40,  T wave inversion more evident in Lateral leads  Confirmed by Tiana Becerra M.D., [place] [facility] ([id number]) on 10/5/2022 3:50:04 PM      EKG, 12 LEAD, INITIAL [[id number]] Collected: 10/01/22 2202    Order Status: Completed Updated: 10/03/22 2205     Ventricular Rate 62 BPM      Atrial Rate 62 BPM      P-R Interval 174 ms      QRS Duration 100 ms      Q-T Interval 478 ms      QTC Calculation (Bezet) 485 ms      Calculated P Axis 85 degrees      Calculated R Axis 85 degrees      Calculated T Axis -125 degrees      Diagnosis --     Normal sinus rhythm  Minimal voltage criteria for LVH, may be normal variant ( [place] product )  ST & T wave abnormality, consider anterior ischemia  Prolonged QT  Abnormal ECG  Confirmed by Liliam Johnston MD., [facility] ([id number]) on 10/3/2022 10:05:32 PM      EKG, 12 LEAD, INITIAL [[id number]] Collected: 10/03/22 0440    Order Status: Completed Updated: 10/03/22 2147     Ventricular Rate 60 BPM      Atrial Rate 60 BPM      P-R Interval 158 ms      QRS Duration 88 ms      Q-T Interval 582 ms      QTC Calculation (Bezet) 582 ms      Calculated R Axis -35 degrees      Calculated T Axis 124 degrees      Diagnosis --     Normal sinus rhythm  Left axis deviation  Nonspecific ST abnormality    When compared with ECG of 02-OCT-2022 06:39,  Left bundle branch block is no longer present  Confirmed by Tiana Becerra M.D., [facility] ([id number]) on 10/3/2022 9:47:18 PM      EKG, 12 LEAD, INITIAL [[id number]] Collected: 10/02/22 0639    Order Status: Completed Updated: 10/03/22 2050     Ventricular Rate 47 BPM      Atrial Rate 47 BPM      P-R Interval 88 ms      QRS Duration 156 ms      Q-T Interval 754 ms      QTC Calculation (Bezet) 667 ms      Calculated R Axis -83 degrees      Calculated T Axis 104 degrees      Diagnosis --     Marked sinus bradycardia with short CO  Left bundle branch block    When compared with ECG of 01-OCT-2022 22:02,  Left bundle branch block is now present  The heart rate has decreased    Confirmed by Leo Gonsales M.D., Jail[facility] Velasquez ([id number]) on 10/3/2022 8:50:23 PM      EKG, 12 LEAD, INITIAL [[id number]]     Order Status: Sent               RADIOGRAPHIC STUDIES:  CARDIAC PROCEDURE  Mild one vessel CAD, Rt dominant   Severe LV systolic dysfunction, EF 15 to 20%  Elevated LVEDP  Mild pulmonary HTN  Adequate cardiac output; CI is 2/3 L/min/m2        DIAGNOSES:  Cardiac Arrest  NSTEMI  Alzheimer's Disease  HTN    IMPRESSION AND PLAN:    Neuro-  Delirium prevention strategies    Cardiac  Continue hemodynamic monitoring, map goal greater than 65, EF on most recent echo 10 to 15%, TAPSE 1.4 cm, off dobutamine now, spironolactone started by advanced heart failure, follow-up cardiology and advanced heart failure recommendations    Pulm  On RA    GI  Diet as tolerated    Renal  Kidney function has normalized, maintain and even to negative fluid balance daily for now, monitor urine output, correct electrolyte derangements as needed    Heme  Impressive drop in plt count - heparin gtt d/madiha 10/4, plt count improving, HIT negative    ID  UA positive on admission, completed ceftriaxone    Endo-  Keep euglycemic    Time 35 mins    This note has been written with voice recognition software. While this note has been edited for accuracy, the software periodically misinterprets speech resulting in errors that might not have been caught in editing. In the event an unusual error is found in this record, please read the chart carefully and recognize, using context, where these substitutions or errors have occurred and please notify me to resolve the errors.

## 2022-10-06 NOTE — PROGRESS NOTES
[facility] in [place], [place]  Inpatient Progress Note      Patient name: Fely Norman  Patient : 1945  Patient MRN: [id number]  Consulting MD: Leilani Perry MD  Date of service: 10/06/22    REASON FOR REFERRAL:  Management of cardiogenic shock     PLAN OF CARE:  64 y/o with h/o new diagnosis of dilated cardiomyopathy, LVEF 10-15%, stage C, NYHA class IIIA presents with VF cardiac arrest c/b acute renal and hepatic failure, extubated, AAOx4 and conversant thus unlikely significant anoxic brain injury with recovering hemodynamics and renal/hepatic function  Weaning dobutamine gtt with stable hemodynamics; hopefully LVEF and hemodynamics remain stable off inotropic support.  poor candidate for home inotropes  LHC/RHC today off inotropes, will obtain non-invasive NICOM today to see if CI correlates     RECOMMENDATIONS:  LHC/RHC today off inotropes  Off dobutamine for caths  Continue midodrine 20mg PO TID for now, will wean post caths if appropriate   If BP stable after midodrine wean, will start low dose BBrx   If BP remains stable off inotropes will start lose dose Entresto  Cont spironolactone 12.5mg daily  Cannot tolerate SGLT2i inhibitor due to UTI  Continue high dose vitamin D weekly x 12 weeks ( 2023)  No diuretics prescribed; appears euvolemic  Not on allopurinol or uloric, uric acid 3.1  Discontinued heparin due to thrombocytopenia; PVD prophylaxis per primary team  Not on aspirin  Not on statins  Will need IP evaluation for JOHANA  Complete ACP; uncertain if patient wishes ICD   Add HF screening labs: gammopathy profile, hepatitis profile, KENNY pending  Consider genetic testing before discharge  Nutritionist consult and heart failure education when patient recovers   Recommend flu, covid and pneumonia vaccinations at discharge     Remainder of care per primary team     IMPRESSION:  Sinus bradycardia, resolved  Acute on chronic HFrEF heart failure  Stage C, NYHA class IV symptoms  Dilated cardiomyopathy, LVEF 10-15%  C/b VF cardiac arrest  C/b cardiogenic shock  C/b renal and hepatic failure  Cardiac risk factors:  HTN  Leukocytosis, improved  Likely UTI (culture not obtained)  Neurocognitive dysfunction  Alzheimer disease  Cannot drive  Memory loss  Vitamin D deficiency     LIFE GOALS:  Lifestyle goals reviewed with the patient. Patient's personal goals include: TBD  Important upcoming milestones or family events: TBD  The patient identifies the following as important for living well: TBD     INTERVAL HISTORY:  Afebrile  -140s, HR 80-90s SR  Weight 110lbs  I/O net negative 440ml  Off inotropes  WBC stable  HGB 10.2  Plt improving to 89  UA with yeast and WBC 10-20  K 4.4   Mg 1.8  Cr 1.0  TBili down to 1.0  proNTBNP > 35K      HISTORY OF PRESENT ILLNESS:  Rosa Johnson is a 65 y.o. female with newly diagnosed few weeks ago severe cardiomyopathy LVEF 25-30% was awaiting CT angiogram had cardiac arrest and collapsed at dinner table. CPR was initiated by her  right away and then paramedic within 7 minutes. She was transferred to [address] [place] [address] initiated on code ice protocol. She was started on diuretics due to significant dyspnea, family described NYHA class IIIB symptoms, which she took for 7 days. CARDIAC IMAGING:  Echo (10/2/22)    Left Ventricle: Severely reduced left ventricular systolic function with a visually estimated EF of 10 -15%. Left ventricle is severely dilated. Normal wall thickness. Severe global hypokinesis present. Right Ventricle: Moderately reduced systolic function. Left Atrium: Left atrium is mildly dilated. Right Atrium: Right atrium is dilated. Technical qualifiers: Echo study was technically difficult. EKG (10/3/22) NSR, septal infarct  [facility] 10/6/22     HEMODYNAMICS:  [facility] 10/6/22  CPEST not done  6MW not done    PHYSICAL EXAM:  Visit Vitals  /76 (BP 1 Location: Left arm, BP Patient Position:  At rest)   Pulse 93   Temp 98.2 °F (36.8 °C)   Resp 26   Ht 5' 2.99\" (1.6 m)   Wt 110 lb 3.7 oz (50 kg)   SpO2 95%   BMI 19.53 kg/m²     Physical Exam  Vitals and nursing note reviewed. Constitutional:       Appearance: Normal appearance. She is not ill-appearing. Cardiovascular:      Rate and Rhythm: Normal rate and regular rhythm. Pulses: Normal pulses. Heart sounds: Normal heart sounds. Pulmonary:      Effort: Pulmonary effort is normal.      Breath sounds: Normal breath sounds. No rhonchi. Abdominal:      General: There is no distension. Palpations: Abdomen is soft. Musculoskeletal:         General: No swelling. Skin:     General: Skin is warm and dry. Neurological:      General: No focal deficit present. Mental Status: She is alert and oriented to person, place, and time. Psychiatric:         Mood and Affect: Mood normal.        REVIEW OF SYSTEMS:  Review of Systems   Constitutional:  Negative for chills, fever and malaise/fatigue. Respiratory:  Negative for wheezing. Cardiovascular:  Negative for chest pain. Gastrointestinal:  Negative for heartburn and nausea. Musculoskeletal:  Negative for falls. R breast tenderness    Neurological:  Negative for dizziness and headaches. Psychiatric/Behavioral:  Negative for depression.        PAST MEDICAL HISTORY:  Past Medical History:   Diagnosis Date    Arthritis     Chronic pain     \"my right side has been hurting for 3-4 months\"    Hypertension        PAST SURGICAL HISTORY:  Past Surgical History:   Procedure Laterality Date    HX BREAST BIOPSY Bilateral     neg    HX BREAST REDUCTION Bilateral 2000    HX GI  2004    colon resection after perforation during ovarian cyst removal    HX HEENT      skin cyst removed from neck    HX HEENT      uvuloplasty    HX HYSTERECTOMY  1982    and bladder repair    HX SHOULDER ARTHROSCOPY      right    HX UROLOGICAL      bladder repair during hysterectomy       FAMILY HISTORY:  No family history on file. SOCIAL HISTORY:  Social History     Socioeconomic History    Marital status:    Tobacco Use    Smoking status: Former     Packs/day: 0.25     Types: Cigarettes     Quit date: 1968     Years since quittin.8    Smokeless tobacco: Never   Substance and Sexual Activity    Alcohol use: No    Drug use: No       LABORATORY RESULTS:     Labs Latest Ref Rng & Units 10/6/2022 10/5/2022 10/5/2022 10/4/2022 10/3/2022 10/2/2022 10/2/2022   WBC 3.6 - 11.0 K/uL 5.2 - 5.6 7.1 14. 2(H) - 15. 2(H)   RBC 3.80 - 5.20 M/uL 4.12 - 3.50(L) 3.61(L) 4.86 - 5.04   Hemoglobin 11.5 - 16.0 g/dL 10. 2(L) - 8. 6(L) 9.0(L) 11.7 - 12.3   Hematocrit 35.0 - 47.0 % 32. 0(L) - 26. 7(L) 28. 4(L) 37.6 - 39.0   MCV 80.0 - 99.0 FL 77. 7(L) - 76. 3(L) 78. 7(L) 77. 4(L) - 77. 4(L)   Platelets 621 - 199 K/uL 89(L) - 55(L) 39(LL) 66(L) - 132(L)   Lymphocytes 12 - 49 % 17 - 12 9(L) 8(L) - 4(L)   Monocytes 5 - 13 % 16(H) - 11 10 6 - 5   Eosinophils 0 - 7 % 4 - 3 2 0 - 0   Basophils 0 - 1 % 0 - 0 0 0 - 0   Bands 0 - 6 % - - - - - - -   Albumin 3.5 - 5.0 g/dL 3. 0(L) 3. 1(L) 2. 8(L) 2. 8(L) 3. 3(L) - 2. 9(L)   Calcium 8.5 - 10.1 MG/DL 9.1 - 8.7 8.4(L) 8.6 8.4(L) 8.2(L)   Glucose 65 - 100 mg/dL 86 - 91 92 161(H) 229(H) 249(H)   BUN 6 - 20 MG/DL 18 - 22(H) 26(H) 34(H) 41(H) 44(H)   Creatinine 0.55 - 1.02 MG/DL 1.00 - 0.89 0.98 1.41(H) 1.68(H) 1.67(H)   Sodium 136 - 145 mmol/L 140 - 131(L) 133(L) 134(L) 134(L) 134(L)   Potassium 3.5 - 5.1 mmol/L 4.4 - 4.4 3.9 3.5 4.4 2.5(LL)   TSH 0.36 - 3.74 uIU/mL - - - 0.41 - - -   Some recent data might be hidden     Lab Results   Component Value Date/Time    TSH 0.41 10/04/2022 06:18 AM       ALLERGY:  Allergies   Allergen Reactions    Iron Other (comments)     IV IRON only gave her convulsions        CURRENT MEDICATIONS:    Current Facility-Administered Medications:     magnesium sulfate 2 g/50 ml IVPB (premix or compounded), 2 g, IntraVENous, ONCE, Alfonso Broussard MD    spironolactone (ALDACTONE) tablet 12.5 mg, 12.5 mg, Oral, DAILY, Tawanna Medrano MD, 12.5 mg at 10/06/22 0832    lidocaine (XYLOCAINE) 4 % cream, , Topical, PRN, Gabriel HATCH MD, Given at 10/06/22 0721    sodium chloride (NS) flush 5-40 mL, 5-40 mL, IntraVENous, Q8H, Federico Rosario MD    sodium chloride (NS) flush 5-40 mL, 5-40 mL, IntraVENous, PRN, Sadie Cisneros MD    therapeutic multivitamin (THERAGRAN) tablet 1 Tablet, 1 Tablet, Oral, DAILY, Gabriel HATCH MD, 1 Tablet at 10/06/22 0831    ergocalciferol capsule 50,000 Units, 50,000 Units, Oral, Q7D, Tawanna Medrano MD, 50,000 Units at 10/04/22 0915    midodrine (PROAMATINE) tablet 20 mg, 20 mg, Oral, Q8H, Alfonso Broussard MD, 20 mg at 10/05/22 2118    guaiFENesin (ROBITUSSIN) 100 mg/5 mL oral liquid 100 mg, 100 mg, Oral, Q4H PRN, Gabriel HATCH MD, 100 mg at 10/05/22 0301    lidocaine 4 % patch 1 Patch, 1 Patch, TransDERmal, Q24H, Yanely Phillips MD, 1 Patch at 10/04/22 2208    DOBUTamine (DOBUTREX) 500 mg/250 mL (2,000 mcg/mL) infusion, 1 mcg/kg/min, IntraVENous, CONTINUOUS, Tawanna Medrano MD, Stopped at 10/05/22 1200    alteplase (CATHFLO) 1 mg in sterile water (preservative free) 1 mL injection, 1 mg, InterCATHeter, PRN, Danley Castleman, Oluwaseyi B, MD    potassium chloride SR (KLOR-CON 10) tablet 20 mEq, 20 mEq, Oral, DAILY, Tawanna Medrano MD, 20 mEq at 10/06/22 0831    HYDROmorphone (DILAUDID) injection 0.5 mg, 0.5 mg, IntraVENous, Q3H PRN, Gabriel HATCH MD, 0.5 mg at 10/05/22 2247    sodium chloride (NS) flush 5-40 mL, 5-40 mL, IntraVENous, Q8H, Jarod Liu, DO, 10 mL at 10/04/22 1357    sodium chloride (NS) flush 5-40 mL, 5-40 mL, IntraVENous, PRN, Senia Seek, DO    acetaminophen (TYLENOL) tablet 650 mg, 650 mg, Oral, Q6H PRN, 650 mg at 10/05/22 1054 **OR** acetaminophen (TYLENOL) suppository 650 mg, 650 mg, Rectal, Q6H PRN, Yarely Warner G, DO    polyethylene glycol (MIRALAX) packet 17 g, 17 g, Oral, DAILY, Rik Gi, DO, 17 g at 10/06/22 3303    senna-docusate (PERICOLACE) 8.6-50 mg per tablet 1 Tablet, 1 Tablet, Oral, BID, Rik Gi, DO, 1 Tablet at 10/06/22 3792    magnesium hydroxide (MILK OF MAGNESIA) 400 mg/5 mL oral suspension 30 mL, 30 mL, Oral, DAILY PRN, Rik Gi, DO    sodium phosphate (FLEET'S) enema 1 Enema, 1 Enema, Rectal, DAILY PRN, Rik [place], DO    ondansetron [facility]) injection 4 mg, 4 mg, IntraVENous, Q6H PRN, Rik Gi, DO, 4 mg at 10/05/22 1152    chlorhexidine (PERIDEX) 0.12 % mouthwash 15 mL, 15 mL, Oral, Q12H, Rik [place], DO, 15 mL at 10/06/22 7260    PATIENT CARE TEAM:  Patient Care Team:  Mona Chung MD as PCP - General (Family Medicine)  Mona Chung MD as PCP - [facility] Provider     Thank you for allowing me to participate in this patient's care.     Volodymyr Baca NP   [address] [place] [address], [address]  Phone: [phone]    Total Patient Care Time: 45 minutes

## 2022-10-06 NOTE — PROGRESS NOTES
Occupational Therapy    Chart reviewed, patient off the floor for LHC/RHC. Will defer at this time and follow up as able.      Amie Limon MS, OTR/L

## 2022-10-06 NOTE — PROGRESS NOTES
0800: Bedside shift change report given to Eduardo Sylvester RN (oncoming nurse) by Sveta Adames RN (offgoing nurse). Report included the following information SBAR, Kardex, Procedure Summary, Intake/Output, MAR, Accordion, Recent Results, Med Rec Status, Cardiac Rhythm NSR, Alarm Parameters , Pre Procedure Checklist, Procedure Verification, Quality Measures, and Dual Neuro Assessment. Assumed care, patient assessed. 0930: Patients , daughter and brother at bedside. Present family members updated on labs and care plan for the day. 1230: Report given to Naga Petersen RN in cath lab.

## 2022-10-06 NOTE — PROGRESS NOTES
Cardiac Cath Lab Procedure Area Arrival Note:    Collette Moose arrived to Cardiac Cath Lab, Procedure Area. Patient identifiers verified with NAME and DATE OF BIRTH. Procedure verified with patient. Consent forms verified. Allergies verified. Patient informed of procedure and plan of care. Questions answered with review. Patient voiced understanding of procedure and plan of care. Patient on cardiac monitor, non-invasive blood pressure, SPO2 monitor. On O2 @ 2 lpm via NC.  IV of NS on pump at 25 ml/hr. Patient status doing well without problems. Patient is A&Ox 4. Patient reports chest soreness but no dyspnea. Patient medicated during procedure with orders obtained and verified by Dr. James Islas. Refer to patients Cardiac Cath Lab PROCEDURE REPORT for vital signs, assessment, status, and response during procedure, printed at end of case. Printed report on chart or scanned into chart. 1400  Transfer to [facility] from Procedure Area    Verbal report given to Christiano Funk RN on Collette Moose being transferred to Cardiac Cath Lab  for routine progression of care   Patient is post RHC/LHC procedure. Patient stable upon transfer to . Report consisted of patients Situation, Background, Assessment and   Recommendations(SBAR). Information from the following report(s) Procedure Summary was reviewed with the receiving nurse. Opportunity for questions and clarification was provided. Patient medicated during procedure with orders obtained and verified by Dr. James Islas. Refer to patient PROCEDURE REPORT for vital signs, assessment, status, and response during procedure.

## 2022-10-06 NOTE — PROGRESS NOTES
SBAR out    Verbal report, including SBAR, Kardex, OR Summary, MAR, given to Riley forest, RN. Pt transferred to CCU for routine progression of care. Opportunity for questions and clarification provided. Pt in no acute distress and in stable condition at transfer of care.

## 2022-10-06 NOTE — PROGRESS NOTES
Physical Therapy    Reviewed chart and attempted to treat pt. Transport present to take pt to for LHC/RHC. Will defer at this time and continue to follow.

## 2022-10-06 NOTE — PROCEDURES
Procedure: ICD implant    10/6/2022        Indication: Cardiomyopathy LVEF =10 %    Secondary prevention post cardiac arrest for V fib    PROCEDURES PERFORMED:  1. Conscious sedation. 2. Fluoroscopy for lead placement. 3. Permanent Implantable Cardioverter Defibrillator (ICD) Implantation:      A:   ICD model  Visia [id number]      B: Placement of ventricular lead with threshold testing. Lead: 6935M     4. ICD defribrillation threshold testing by the step down method    COMPLICATIONS:None. ESTIMATED BLOOD LOSS: Minimal  SPECIMENS: None  ASSISTANTS: See Francisco Javier    PROCEDURAL NOTE:  The patient was brought to the laboratory in a sedated postabsorptive state. The left chest wall was prepped and draped in the usual fashion and anesthetized with 20 mL of 2% lidocaine. Incision was made over the deltopectoral groove and extended to the level of the pectoralis fascia. A pocket was made anterior to the pectoralis fascia for in which to implant the device. The left axillary vein was cannulated by the Seldinger technique under fluorscopic guidance. A guide wire was advanced into the central circulation followed by a tear away sheath. The left subclavian vein was accessed by the seldinger technique. A guide wire was advanced into the central circulation followed by a tear away sheath. The ventricular lead was inserted and advanced to the right ventricular apex. Threshold testing was performed. Once adequate position was obtained the peel-away sheath was removed after a new J-wire was advanced using a retained wire technique with a peel-away sheath. The ICD pocket was flushed with  sterile saline  solution. The lead was to attached to generator. Lead  and generator placed in the pocket with the lead posterior to the generator in an antibiotic eluting pouch . Subcutaneous tissue closed in 2 layers utilizing #2-0 Vicryl. Skin closed with dermabond glue with an excellent final result.   Defibrillation threshold test was performed by the step down method with a threshold of 15 J. The device was programed to > 2 times the defibrillation threshold. The patient was transferred to the holding area    No complications were noted.      I       IMPRESSION AND RECOMMENDATION:  The patient will be followed up in the [address] [place] [address]

## 2022-10-06 NOTE — PROGRESS NOTES
Physician Progress Note      PATIENT:               Darien Ceron  CSN #:                  [id number]  :                       1945  ADMIT DATE:       10/1/2022 9:12 PM  [address] DATE:        10/2/2022 2:04 AM  RESPONDING  PROVIDER #:        Collins Allen MD          QUERY TEXTValaria Zoraida Afternoon    This patinet admitted on 10/01/2022-10/02/2022, after presented from Cardiac arrest at home. If possible, please document in progress notes and discharge summary the cause of cardiac arrest:      The medical record reflects the following:  Risk Factors:  hx of CHF, HTN  Clinical Indicators: Presented after cardiac arrest and CPR at home, noted to be in V-fib and required defib. EKG not \"consistent with NSTEMI per cards consult  Treatment: Intubated, placed on vent, IV pressors, Cards consulted, HS Troponin @ 271--Amino Drip, transferred to [facility] CCU    Thank you  CHRISTAL MorrowN,RN, CPHQ, CCDS, SMART  Options provided:  -- Cardiac Arrest due to AMI  -- Cardiac Arrest due to VFib  -- Cardiac Arrest due to Acute Respiratory Failure  -- Other - I will add my own diagnosis  -- Disagree - Not applicable / Not valid  -- Disagree - Clinically unable to determine / Unknown  -- Refer to Clinical Documentation Reviewer    PROVIDER RESPONSE TEXT:    This patient had cardiac arrest due to Ventricular fibrillation.     Query created by: Cielo Felix on 10/6/2022 3:03 PM      Electronically signed by:  Collins Allen MD 10/6/2022 3:52 PM

## 2022-10-06 NOTE — ROUTINE PROCESS
Cardiac Cath Lab Procedure Area Arrival Note:    Mary Nielsen arrived to Cardiac Cath Lab, Procedure Area. Patient identifiers verified with NAME and DATE OF BIRTH. Procedure verified with patient. Consent forms verified. Allergies verified. Patient informed of procedure and plan of care. Questions answered with review. Patient voiced understanding of procedure and plan of care. Patient on cardiac monitor, non-invasive blood pressure, SPO2 monitor. On stretcher or O2 @ 4 lpm via NC.  IV of  ml on pump at [facility] ml/hr. Patient status doing well without problems. Patient is A&Ox 1. Patient reports no complaints. Patient medicated during procedure with orders obtained and verified by Dr. Teresa Jacques. Refer to patients Cardiac Cath Lab PROCEDURE REPORT for vital signs, assessment, status, and response during procedure, printed at end of case. Printed report on chart or scanned into chart.

## 2022-10-06 NOTE — PROGRESS NOTES
1930-Bedside shift change report given to Ju Springer (oncoming nurse) by Christopher Bautista RN (offgoing nurse). Report included the following information SBAR, Kardex, Intake/Output, MAR, Recent Results, and Cardiac Rhythm NSR .     2030-CXR called as order was placed STAT.    0540-Pt ICD interrogated, info sent to [employer]. 0630-Pt up to [facility] with one assist.    0730-Bedside shift change report given to Calvin Blank RN (oncoming nurse) by Suraj Miranda RN (offgoing nurse). Report included the following information SBAR, Kardex, Intake/Output, MAR, Recent Results, and Cardiac Rhythm NSR .

## 2022-10-07 LAB
ALBUMIN SERPL-MCNC: 3.3 G/DL (ref 3.5–5)
ALBUMIN/GLOB SERPL: 1.2 {RATIO} (ref 1.1–2.2)
ALP SERPL-CCNC: 100 U/L (ref 45–117)
ALT SERPL-CCNC: 38 U/L (ref 12–78)
ANION GAP SERPL CALC-SCNC: 11 MMOL/L (ref 5–15)
APTT PPP: 25.1 SEC (ref 22.1–31)
AST SERPL-CCNC: 23 U/L (ref 15–37)
BACTERIA SPEC CULT: NORMAL
BACTERIA SPEC CULT: NORMAL
BASOPHILS # BLD: 0 K/UL (ref 0–0.1)
BASOPHILS NFR BLD: 0 % (ref 0–1)
BILIRUB SERPL-MCNC: 1 MG/DL (ref 0.2–1)
BNP SERPL-MCNC: ABNORMAL PG/ML
BUN SERPL-MCNC: 16 MG/DL (ref 6–20)
BUN/CREAT SERPL: 15 (ref 12–20)
CALCIUM SERPL-MCNC: 9.4 MG/DL (ref 8.5–10.1)
CHLORIDE SERPL-SCNC: 103 MMOL/L (ref 97–108)
CO2 SERPL-SCNC: 24 MMOL/L (ref 21–32)
CREAT SERPL-MCNC: 1.04 MG/DL (ref 0.55–1.02)
CRP SERPL-MCNC: 1.59 MG/DL (ref 0–0.6)
DIFFERENTIAL METHOD BLD: ABNORMAL
EOSINOPHIL # BLD: 0.1 K/UL (ref 0–0.4)
EOSINOPHIL NFR BLD: 1 % (ref 0–7)
ERYTHROCYTE [DISTWIDTH] IN BLOOD BY AUTOMATED COUNT: 12.9 % (ref 11.5–14.5)
GLOBULIN SER CALC-MCNC: 2.7 G/DL (ref 2–4)
GLUCOSE SERPL-MCNC: 100 MG/DL (ref 65–100)
HCT VFR BLD AUTO: 33.6 % (ref 35–47)
HGB BLD-MCNC: 10.7 G/DL (ref 11.5–16)
IMM GRANULOCYTES # BLD AUTO: 0.1 K/UL (ref 0–0.04)
IMM GRANULOCYTES NFR BLD AUTO: 1 % (ref 0–0.5)
INR PPP: 1.1 (ref 0.9–1.1)
LACTATE SERPL-SCNC: 1.4 MMOL/L (ref 0.4–2)
LYMPHOCYTES # BLD: 0.7 K/UL (ref 0.8–3.5)
LYMPHOCYTES NFR BLD: 10 % (ref 12–49)
MAGNESIUM SERPL-MCNC: 1.8 MG/DL (ref 1.6–2.4)
MCH RBC QN AUTO: 24.4 PG (ref 26–34)
MCHC RBC AUTO-ENTMCNC: 31.8 G/DL (ref 30–36.5)
MCV RBC AUTO: 76.7 FL (ref 80–99)
MONOCYTES # BLD: 1.3 K/UL (ref 0–1)
MONOCYTES NFR BLD: 19 % (ref 5–13)
NEUTS SEG # BLD: 4.6 K/UL (ref 1.8–8)
NEUTS SEG NFR BLD: 69 % (ref 32–75)
NRBC # BLD: 0.02 K/UL (ref 0–0.01)
NRBC BLD-RTO: 0.3 PER 100 WBC
PHOSPHATE SERPL-MCNC: 2.7 MG/DL (ref 2.6–4.7)
PLATELET # BLD AUTO: 112 K/UL (ref 150–400)
POTASSIUM SERPL-SCNC: 4.9 MMOL/L (ref 3.5–5.1)
PROT SERPL-MCNC: 6 G/DL (ref 6.4–8.2)
PROTHROMBIN TIME: 11.7 SEC (ref 9–11.1)
RBC # BLD AUTO: 4.38 M/UL (ref 3.8–5.2)
RBC MORPH BLD: ABNORMAL
SERVICE CMNT-IMP: NORMAL
SERVICE CMNT-IMP: NORMAL
SODIUM SERPL-SCNC: 138 MMOL/L (ref 136–145)
THERAPEUTIC RANGE,PTTT: NORMAL SECS (ref 58–77)
WBC # BLD AUTO: 6.8 K/UL (ref 3.6–11)

## 2022-10-07 PROCEDURE — 99233 SBSQ HOSP IP/OBS HIGH 50: CPT | Performed by: NURSE PRACTITIONER

## 2022-10-07 PROCEDURE — 65270000046 HC RM TELEMETRY

## 2022-10-07 PROCEDURE — 74011000250 HC RX REV CODE- 250: Performed by: EMERGENCY MEDICINE

## 2022-10-07 PROCEDURE — 80053 COMPREHEN METABOLIC PANEL: CPT

## 2022-10-07 PROCEDURE — 74011000250 HC RX REV CODE- 250: Performed by: ANESTHESIOLOGY

## 2022-10-07 PROCEDURE — 97164 PT RE-EVAL EST PLAN CARE: CPT | Performed by: PHYSICAL THERAPIST

## 2022-10-07 PROCEDURE — 83880 ASSAY OF NATRIURETIC PEPTIDE: CPT

## 2022-10-07 PROCEDURE — 74011250637 HC RX REV CODE- 250/637: Performed by: STUDENT IN AN ORGANIZED HEALTH CARE EDUCATION/TRAINING PROGRAM

## 2022-10-07 PROCEDURE — 85730 THROMBOPLASTIN TIME PARTIAL: CPT

## 2022-10-07 PROCEDURE — 74011250637 HC RX REV CODE- 250/637: Performed by: NURSE PRACTITIONER

## 2022-10-07 PROCEDURE — 74011250637 HC RX REV CODE- 250/637: Performed by: INTERNAL MEDICINE

## 2022-10-07 PROCEDURE — 97116 GAIT TRAINING THERAPY: CPT | Performed by: PHYSICAL THERAPIST

## 2022-10-07 PROCEDURE — 86140 C-REACTIVE PROTEIN: CPT

## 2022-10-07 PROCEDURE — 83735 ASSAY OF MAGNESIUM: CPT

## 2022-10-07 PROCEDURE — 97535 SELF CARE MNGMENT TRAINING: CPT

## 2022-10-07 PROCEDURE — 74011000250 HC RX REV CODE- 250: Performed by: NURSE PRACTITIONER

## 2022-10-07 PROCEDURE — 36415 COLL VENOUS BLD VENIPUNCTURE: CPT

## 2022-10-07 PROCEDURE — 74011250636 HC RX REV CODE- 250/636: Performed by: STUDENT IN AN ORGANIZED HEALTH CARE EDUCATION/TRAINING PROGRAM

## 2022-10-07 PROCEDURE — 74011000250 HC RX REV CODE- 250: Performed by: STUDENT IN AN ORGANIZED HEALTH CARE EDUCATION/TRAINING PROGRAM

## 2022-10-07 PROCEDURE — 74011000250 HC RX REV CODE- 250: Performed by: INTERNAL MEDICINE

## 2022-10-07 PROCEDURE — 97168 OT RE-EVAL EST PLAN CARE: CPT

## 2022-10-07 PROCEDURE — 85025 COMPLETE CBC W/AUTO DIFF WBC: CPT

## 2022-10-07 PROCEDURE — 85610 PROTHROMBIN TIME: CPT

## 2022-10-07 PROCEDURE — 83605 ASSAY OF LACTIC ACID: CPT

## 2022-10-07 PROCEDURE — P9045 ALBUMIN (HUMAN), 5%, 250 ML: HCPCS | Performed by: NURSE PRACTITIONER

## 2022-10-07 PROCEDURE — 74011250637 HC RX REV CODE- 250/637: Performed by: EMERGENCY MEDICINE

## 2022-10-07 PROCEDURE — 74011250636 HC RX REV CODE- 250/636: Performed by: NURSE PRACTITIONER

## 2022-10-07 PROCEDURE — 84100 ASSAY OF PHOSPHORUS: CPT

## 2022-10-07 RX ORDER — BUMETANIDE 1 MG/1
1 TABLET ORAL DAILY
Status: DISCONTINUED | OUTPATIENT
Start: 2022-10-08 | End: 2022-10-12

## 2022-10-07 RX ORDER — ALBUMIN HUMAN 50 G/1000ML
12.5 SOLUTION INTRAVENOUS ONCE
Status: COMPLETED | OUTPATIENT
Start: 2022-10-07 | End: 2022-10-07

## 2022-10-07 RX ORDER — BUMETANIDE 0.25 MG/ML
1 INJECTION INTRAMUSCULAR; INTRAVENOUS 2 TIMES DAILY
Status: COMPLETED | OUTPATIENT
Start: 2022-10-07 | End: 2022-10-07

## 2022-10-07 RX ORDER — MAGNESIUM SULFATE HEPTAHYDRATE 40 MG/ML
2 INJECTION, SOLUTION INTRAVENOUS ONCE
Status: DISCONTINUED | OUTPATIENT
Start: 2022-10-07 | End: 2022-10-07

## 2022-10-07 RX ADMIN — ACETAMINOPHEN 650 MG: 325 TABLET, FILM COATED ORAL at 21:40

## 2022-10-07 RX ADMIN — MIDODRINE HYDROCHLORIDE 15 MG: 5 TABLET ORAL at 13:26

## 2022-10-07 RX ADMIN — MIDODRINE HYDROCHLORIDE 15 MG: 5 TABLET ORAL at 21:40

## 2022-10-07 RX ADMIN — SODIUM CHLORIDE, PRESERVATIVE FREE 10 ML: 5 INJECTION INTRAVENOUS at 14:53

## 2022-10-07 RX ADMIN — LIDOCAINE 4%: 4 CREAM TOPICAL at 05:46

## 2022-10-07 RX ADMIN — SPIRONOLACTONE 12.5 MG: 25 TABLET ORAL at 09:19

## 2022-10-07 RX ADMIN — MIDODRINE HYDROCHLORIDE 15 MG: 5 TABLET ORAL at 06:31

## 2022-10-07 RX ADMIN — ALBUMIN HUMAN 12.5 G: 50 SOLUTION INTRAVENOUS at 13:26

## 2022-10-07 RX ADMIN — SENNOSIDES AND DOCUSATE SODIUM 1 TABLET: 50; 8.6 TABLET ORAL at 21:40

## 2022-10-07 RX ADMIN — ONDANSETRON 4 MG: 2 INJECTION INTRAMUSCULAR; INTRAVENOUS at 11:21

## 2022-10-07 RX ADMIN — SODIUM CHLORIDE, PRESERVATIVE FREE 10 ML: 5 INJECTION INTRAVENOUS at 21:41

## 2022-10-07 RX ADMIN — THERA TABS 1 TABLET: TAB at 09:19

## 2022-10-07 RX ADMIN — GUAIFENESIN 100 MG: 200 SOLUTION ORAL at 11:21

## 2022-10-07 RX ADMIN — ACETAMINOPHEN 650 MG: 325 TABLET, FILM COATED ORAL at 11:21

## 2022-10-07 RX ADMIN — ACETAMINOPHEN 650 MG: 325 TABLET, FILM COATED ORAL at 06:31

## 2022-10-07 RX ADMIN — CHLORHEXIDINE GLUCONATE 15 ML: 1.2 RINSE ORAL at 09:25

## 2022-10-07 RX ADMIN — BUMETANIDE 1 MG: 0.25 INJECTION INTRAMUSCULAR; INTRAVENOUS at 09:19

## 2022-10-07 RX ADMIN — LIDOCAINE 4%: 4 CREAM TOPICAL at 11:21

## 2022-10-07 NOTE — PROGRESS NOTES
JOHN: Anticipate discharge home with [facility] PT/OT through [facility] pending medical progress. Transportation likely in car with /daughter. RUR: 13%     Emergency contact: Juan Boothe, spouse, [phone]     Disposition: Patient admitted 10/2 for cardiac arrest. Patient is from home where she lives with her .  is primary caregiver. Patient does have a history of dementia. Patient had a left and right heart cath yesterday as well as placement of AICD. Transfer orders placed and patient is now being followed by the hospitalist team. Cardiology and AHFT following. Anticipate discharge in 24-48 hours.     Kin Escalera, [address] [place] [address],[address]  [phone]

## 2022-10-07 NOTE — PROGRESS NOTES
0730: Bedside shift change report given to Oswald Alfredo RN and VY Diaz (oncoming nurse) by Catie Prado (offgoing nurse). Report included the following information SBAR, MAR, Recent Results, Cardiac Rhythm NSR, Alarm Parameters , and Dual Neuro Assessment. 0800: Ender Munoz MD @ bedside, patient stable to transfer out from CCU    1000: Tess Lynne MD @ bedside, no new orders received    1120: Pt complaining of pain in the area of her sternum and left upper chest ( s/p CPR and ICD implementation), Tylenol administered    1130: Care management @ bedside. 1200: BOB Nicholson with Heart Failure team at bedside, 250 ml albumin 5% orders received. 1430: PT/ OT working with patient. Pt ambulated in hallways with walker and standby assist.     [facility]: Bedside shift change report given to Tavo Lebron RN (oncoming nurse) by Gina Yip RN and Oswald Alfredo RN (offgoing nurse). Report included the following information SBAR, MAR, Cardiac Rhythm NSR, and Dual Neuro Assessment.

## 2022-10-07 NOTE — PROGRESS NOTES
Cardiology Progress Note                                        Admit Date: 10/2/2022    Assessment/Plan:     S/p cardiac arrest; now s/p ICD, no significant CAD on cath  CHF; improving; RHC with elevated LVEDP and PASP  Thrombocytopenia; coming up   Cardiomyopathy; severe; continue current cardiac regimen    Grace Hickey is a 65 y.o. female with     PROBLEM LIST:  Patient Active Problem List    Diagnosis Date Noted    Acute systolic heart failure ([facility] [address] 10/04/2022    [facility] 10/01/2022         Subjective:     Grace Hickey reports none. Visit Vitals  /72   Pulse 82   Temp 98.6 °F (37 °C)   Resp 20   Ht 5' 2.99\" (1.6 m)   Wt 48.9 kg (107 lb 12.9 oz)   SpO2 98%   BMI 19.10 kg/m²       Intake/Output Summary (Last 24 hours) at 10/7/2022 1038  Last data filed at 10/7/2022 0930  Gross per 24 hour   Intake 50 ml   Output 1201 ml   Net -1151 ml         Objective:      Physical Exam:  HEENT: Perrla, EOMI  Neck: No JVD,  No thyroidmegaly  Resp: CTA bilaterally;  No wheezes or rales  CV: RRR s1s2 No murmur, + s3  Abd:Soft, Nontender  Ext: No edema  Neuro: Alert and oriented; Nonfocal  Skin: Warm, Dry, Intact  Pulses: 2+ DP/PT/Rad      Telemetry: normal sinus rhythm    Current Facility-Administered Medications   Medication Dose Route Frequency    [START ON 10/8/2022] bumetanide (BUMEX) tablet 1 mg  1 mg Oral DAILY    midodrine (PROAMATINE) tablet 15 mg  15 mg Oral Q8H    sodium chloride (NS) flush 5-40 mL  5-40 mL IntraVENous Q8H    sodium chloride (NS) flush 5-40 mL  5-40 mL IntraVENous PRN    HYDROcodone-acetaminophen (NORCO) 5-325 mg per tablet 1 Tablet  1 Tablet Oral Q4H PRN    spironolactone (ALDACTONE) tablet 12.5 mg  12.5 mg Oral DAILY    lidocaine (XYLOCAINE) 4 % cream   Topical PRN    therapeutic multivitamin (THERAGRAN) tablet 1 Tablet  1 Tablet Oral DAILY    ergocalciferol capsule 50,000 Units  50,000 Units Oral Q7D    guaiFENesin (ROBITUSSIN) 100 mg/5 mL oral liquid 100 mg  100 mg Oral Q4H PRN    lidocaine 4 % patch 1 Patch  1 Patch TransDERmal Q24H    alteplase (CATHFLO) 1 mg in sterile water (preservative free) 1 mL injection  1 mg InterCATHeter PRN    HYDROmorphone (DILAUDID) injection 0.5 mg  0.5 mg IntraVENous Q3H PRN    sodium chloride (NS) flush 5-40 mL  5-40 mL IntraVENous Q8H    sodium chloride (NS) flush 5-40 mL  5-40 mL IntraVENous PRN    acetaminophen (TYLENOL) tablet 650 mg  650 mg Oral Q6H PRN    Or    acetaminophen (TYLENOL) suppository 650 mg  650 mg Rectal Q6H PRN    polyethylene glycol (MIRALAX) packet 17 g  17 g Oral DAILY    senna-docusate (PERICOLACE) 8.6-50 mg per tablet 1 Tablet  1 Tablet Oral BID    magnesium hydroxide (MILK OF MAGNESIA) 400 mg/5 mL oral suspension 30 mL  30 mL Oral DAILY PRN    sodium phosphate (FLEET'S) enema 1 Enema  1 Enema Rectal DAILY PRN    ondansetron (ZOFRAN) injection 4 mg  4 mg IntraVENous Q6H PRN    chlorhexidine (PERIDEX) 0.12 % mouthwash 15 mL  15 mL Oral Q12H         Data Review:   Labs:    Recent Results (from the past 24 hour(s))   METABOLIC PANEL, COMPREHENSIVE    Collection Time: 10/07/22  5:25 AM   Result Value Ref Range    Sodium 138 136 - 145 mmol/L    Potassium 4.9 3.5 - 5.1 mmol/L    Chloride 103 97 - 108 mmol/L    CO2 24 21 - 32 mmol/L    Anion gap 11 5 - 15 mmol/L    Glucose 100 65 - 100 mg/dL    BUN 16 6 - 20 MG/DL    Creatinine 1.04 (H) 0.55 - 1.02 MG/DL    BUN/Creatinine ratio 15 12 - 20      eGFR 55 (L) >60 ml/min/1.73m2    Calcium 9.4 8.5 - 10.1 MG/DL    Bilirubin, total 1.0 0.2 - 1.0 MG/DL    ALT (SGPT) 38 12 - 78 U/L    AST (SGOT) 23 15 - 37 U/L    Alk.  phosphatase 100 45 - 117 U/L    Protein, total 6.0 (L) 6.4 - 8.2 g/dL    Albumin 3.3 (L) 3.5 - 5.0 g/dL    Globulin 2.7 2.0 - 4.0 g/dL    A-G Ratio 1.2 1.1 - 2.2     LACTIC ACID    Collection Time: 10/07/22  5:25 AM   Result Value Ref Range    Lactic acid 1.4 0.4 - 2.0 MMOL/L   MAGNESIUM    Collection Time: 10/07/22  5:25 AM   Result Value Ref Range    Magnesium 1.8 1.6 - 2.4 mg/dL   PHOSPHORUS    Collection Time: 10/07/22  5:25 AM   Result Value Ref Range    Phosphorus 2.7 2.6 - 4.7 MG/DL   CBC WITH AUTOMATED DIFF    Collection Time: 10/07/22  5:25 AM   Result Value Ref Range    WBC 6.8 3.6 - 11.0 K/uL    RBC 4.38 3.80 - 5.20 M/uL    HGB 10.7 (L) 11.5 - 16.0 g/dL    HCT 33.6 (L) 35.0 - 47.0 %    MCV 76.7 (L) 80.0 - 99.0 FL    MCH 24.4 (L) 26.0 - 34.0 PG    MCHC 31.8 30.0 - 36.5 g/dL    RDW 12.9 11.5 - 14.5 %    PLATELET 553 (L) 745 - 400 K/uL    NRBC 0.3 (H) 0  WBC    ABSOLUTE NRBC 0.02 (H) 0.00 - 0.01 K/uL    NEUTROPHILS 69 32 - 75 %    LYMPHOCYTES 10 (L) 12 - 49 %    MONOCYTES 19 (H) 5 - 13 %    EOSINOPHILS 1 0 - 7 %    BASOPHILS 0 0 - 1 %    IMMATURE GRANULOCYTES 1 (H) 0.0 - 0.5 %    ABS. NEUTROPHILS 4.6 1.8 - 8.0 K/UL    ABS. LYMPHOCYTES 0.7 (L) 0.8 - 3.5 K/UL    ABS. MONOCYTES 1.3 (H) 0.0 - 1.0 K/UL    ABS. EOSINOPHILS 0.1 0.0 - 0.4 K/UL    ABS. BASOPHILS 0.0 0.0 - 0.1 K/UL    ABS. IMM.  GRANS. 0.1 (H) 0.00 - 0.04 K/UL    DF SMEAR SCANNED      RBC COMMENTS MICROCYTOSIS  1+       PTT    Collection Time: 10/07/22  5:25 AM   Result Value Ref Range    aPTT 25.1 22.1 - 31.0 sec    aPTT, therapeutic range     58.0 - 77.0 SECS   PROTHROMBIN TIME + INR    Collection Time: 10/07/22  5:25 AM   Result Value Ref Range    INR 1.1 0.9 - 1.1      Prothrombin time 11.7 (H) 9.0 - 11.1 sec   C REACTIVE PROTEIN, QT    Collection Time: 10/07/22  5:25 AM   Result Value Ref Range    C-Reactive protein 1.59 (H) 0.00 - 0.60 mg/dL   NT-PRO BNP    Collection Time: 10/07/22  5:25 AM   Result Value Ref Range    NT pro-BNP >35,000 (H) <450 PG/ML

## 2022-10-07 NOTE — PROGRESS NOTES
[facility] in [place], [place]  Inpatient Progress Note      Patient name: Anthony Gibbons  Patient : 1945  Patient MRN: [id number]  Consulting MD: Hussain Huang MD  Date of service: 10/07/22    REASON FOR REFERRAL:  Management of cardiogenic shock     PLAN OF CARE:  64 y/o with h/o new diagnosis of dilated cardiomyopathy, LVEF 10-15%, stage C, NYHA class IIIA presents with VF cardiac arrest c/b acute renal and hepatic failure, extubated, AAOx4 and conversant thus unlikely significant anoxic brain injury with recovering hemodynamics and renal/hepatic function  Stable hemodynamics off Doutamine; hopefully LVEF and hemodynamics remain stable off inotropic support. poor candidate for home inotropes  LHC/RHC yesterday off inotropes with minimal CAD, PCWP 26 and CI of 2.3, will obtain non-invasive NICOM today to see if CI correlates     RECOMMENDATIONS:  Hemodynamics stable off Dobutamine  Continue midodrine 15mg PO TID for now, will wean as tolerated  If BP stable after midodrine wean, will start low dose BBrx   If BP remains stable off inotropes will start lose dose Entresto  Cont spironolactone 12.5mg daily  Cannot tolerate SGLT2i inhibitor due to UTI  Continue high dose vitamin D weekly x 12 weeks ( 2023)  Transition to PO bumex tomorrow   Not on allopurinol or uloric, uric acid 3.1  Discontinued heparin due to thrombocytopenia; PVD prophylaxis per primary team  Not on aspirin  Not on statins  Repeat TTE before discharge   Will need IP evaluation for JOHANA  S/o AICD for SCD prevention per Dr. Claudean Plenty HF screening labs: gammopathy profile, hepatitis profile, KENNY pending  Consider genetic testing before discharge  Nutritionist consult and heart failure education when patient recovers   Recommend flu, covid and pneumonia vaccinations at discharge     Patient assessed. High suspicion of sleep apnea due to the following reasons.  Will refer for sleep evaluation. [x] Heart Failure  [] Hypertention  [] Atrial Fibrillation  [] BMI > 30  [] History of stroke  [] Diabetes  [] Heavy snoring  [] Witnessed apnea  [] Hypoxemia      Remainder of care per primary team     IMPRESSION:  Sinus bradycardia, resolved  Acute on chronic HFrEF heart failure  Stage C, NYHA class IV symptoms  Dilated cardiomyopathy, LVEF 10-15%  C/b VF cardiac arrest  C/b cardiogenic shock  C/b renal and hepatic failure  Cardiac risk factors:  HTN  Leukocytosis, improved  Likely UTI (culture not obtained)  Neurocognitive dysfunction  Alzheimer disease  Cannot drive  Memory loss  Vitamin D deficiency     LIFE GOALS:  Lifestyle goals reviewed with the patient. Patient's personal goals include: TBD  Important upcoming milestones or family events: none at this time  The patient identifies the following as important for living well: TBD     INTERVAL HISTORY:  Afebrile  SBP 110s, HR 80-90s SR  Weight 107lbs  I/O net negative inaccurate   CBC stable   Plt improving to 112  K 4.8  Mg 1.8  Cr 1.0  TBili 1.0  proNTBNP > 35K   RHC and AICD placed      HISTORY OF PRESENT ILLNESS:  Saira Smiley is a 65 y.o. female with newly diagnosed few weeks ago severe cardiomyopathy LVEF 25-30% was awaiting CT angiogram had cardiac arrest and collapsed at dinner table. CPR was initiated by her  right away and then paramedic within 7 minutes. She was transferred to [facility] [place] initiated on code ice protocol. She was started on diuretics due to significant dyspnea, family described NYHA class IIIB symptoms, which she took for 7 days. CARDIAC IMAGING:  Echo (10/2/22)    Left Ventricle: Severely reduced left ventricular systolic function with a visually estimated EF of 10 -15%. Left ventricle is severely dilated. Normal wall thickness. Severe global hypokinesis present. Right Ventricle: Moderately reduced systolic function. Left Atrium: Left atrium is mildly dilated. Right Atrium: Right atrium is dilated. Technical qualifiers: Echo study was technically difficult. EKG (10/3/22) NSR, septal infarct  LHC 10/6/22 mild 1V CAD     HEMODYNAMICS:  RHC 10/6/22 PAP 53/24/35, RA 9, PCWP 26, CI 2.3   CPEST not done  6MW not done    PHYSICAL EXAM:  Visit Vitals  /61   Pulse 90   Temp 98.6 °F (37 °C)   Resp 24   Ht 5' 2.99\" (1.6 m)   Wt 107 lb 12.9 oz (48.9 kg)   SpO2 98%   BMI 19.10 kg/m²     Physical Exam  Vitals and nursing note reviewed. Constitutional:       Appearance: Normal appearance. She is not ill-appearing. Cardiovascular:      Rate and Rhythm: Normal rate and regular rhythm. Pulses: Normal pulses. Heart sounds: Normal heart sounds. Pulmonary:      Effort: Pulmonary effort is normal.      Breath sounds: Normal breath sounds. No rhonchi. Abdominal:      General: There is no distension. Palpations: Abdomen is soft. Musculoskeletal:         General: No swelling. Skin:     General: Skin is warm and dry. Neurological:      General: No focal deficit present. Mental Status: She is alert and oriented to person, place, and time. Psychiatric:         Mood and Affect: Mood normal.        REVIEW OF SYSTEMS:  Review of Systems   Constitutional:  Negative for chills, fever and malaise/fatigue. Respiratory:  Negative for wheezing. Cardiovascular:  Negative for chest pain. Gastrointestinal:  Negative for heartburn and nausea. Musculoskeletal:  Negative for falls. R breast tenderness    Neurological:  Negative for dizziness and headaches. Psychiatric/Behavioral:  Negative for depression.        PAST MEDICAL HISTORY:  Past Medical History:   Diagnosis Date    Arthritis     Chronic pain     \"my right side has been hurting for 3-4 months\"    Hypertension        PAST SURGICAL HISTORY:  Past Surgical History:   Procedure Laterality Date    HX BREAST BIOPSY Bilateral     neg    HX BREAST REDUCTION Bilateral 2000    HX GI  2004    colon resection after perforation during ovarian cyst removal    HX HEENT      skin cyst removed from neck    HX HEENT      uvuloplasty    HX HYSTERECTOMY  1982    and bladder repair    HX SHOULDER ARTHROSCOPY      right    HX UROLOGICAL      bladder repair during hysterectomy       FAMILY HISTORY:  No family history on file. SOCIAL HISTORY:  Social History     Socioeconomic History    Marital status:    Tobacco Use    Smoking status: Former     Packs/day: 0.25     Types: Cigarettes     Quit date:      Years since quittin.8    Smokeless tobacco: Never   Substance and Sexual Activity    Alcohol use: No    Drug use: No       LABORATORY RESULTS:     Labs Latest Ref Rng & Units 10/7/2022 10/6/2022 10/5/2022 10/5/2022 10/4/2022 10/3/2022 10/2/2022   WBC 3.6 - 11.0 K/uL 6.8 5.2 - 5.6 7.1 14. 2(H) -   RBC 3.80 - 5.20 M/uL 4.38 4.12 - 3.50(L) 3.61(L) 4.86 -   Hemoglobin 11.5 - 16.0 g/dL 10. 7(L) 10. 2(L) - 8. 6(L) 9.0(L) 11.7 -   Hematocrit 35.0 - 47.0 % 33. 6(L) 32. 0(L) - 26. 7(L) 28. 4(L) 37.6 -   MCV 80.0 - 99.0 FL 76. 7(L) 77. 7(L) - 76. 3(L) 78. 7(L) 77. 4(L) -   Platelets 848 - 469 K/uL 112(L) 89(L) - 55(L) 39(LL) 66(L) -   Lymphocytes 12 - 49 % 10(L) 17 - 12 9(L) 8(L) -   Monocytes 5 - 13 % 19(H) 16(H) - 11 10 6 -   Eosinophils 0 - 7 % 1 4 - 3 2 0 -   Basophils 0 - 1 % 0 0 - 0 0 0 -   Bands 0 - 6 % - - - - - - -   Albumin 3.5 - 5.0 g/dL 3. 3(L) 3.0(L) 3. 1(L) 2. 8(L) 2. 8(L) 3. 3(L) -   Calcium 8.5 - 10.1 MG/DL 9.4 9.1 - 8.7 8.4(L) 8.6 8.4(L)   Glucose 65 - 100 mg/dL 100 86 - 91 92 161(H) 229(H)   BUN 6 - 20 MG/DL 16 18 - 22(H) 26(H) 34(H) 41(H)   Creatinine 0.55 - 1.02 MG/DL 1.04(H) 1.00 - 0.89 0.98 1.41(H) 1.68(H)   Sodium 136 - 145 mmol/L 138 140 - 131(L) 133(L) 134(L) 134(L)   Potassium 3.5 - 5.1 mmol/L 4.9 4.4 - 4.4 3.9 3.5 4.4   TSH 0.36 - 3.74 uIU/mL - - - - 0.41 - -   Some recent data might be hidden     Lab Results   Component Value Date/Time    TSH 0.41 10/04/2022 06:18 AM       ALLERGY:  Allergies   Allergen Reactions    Iron Other (comments)     IV IRON only gave her convulsions        CURRENT MEDICATIONS:    Current Facility-Administered Medications:     midodrine (PROAMATINE) tablet 15 mg, 15 mg, Oral, Q8H, Lyn Norma B, NP, 15 mg at 10/07/22 0631    bumetanide (BUMEX) injection 1 mg, 1 mg, IntraVENous, BID, Lyn, Norma B, NP, 1 mg at 10/06/22 1754    sodium chloride (NS) flush 5-40 mL, 5-40 mL, IntraVENous, Q8H, Federico Rosario MD    sodium chloride (NS) flush 5-40 mL, 5-40 mL, IntraVENous, PRN, Brooklyn Ko MD    HYDROcodone-acetaminophen (NORCO) 5-325 mg per tablet 1 Tablet, 1 Tablet, Oral, Q4H PRN, Saskia Nicole MD    spironolactone (ALDACTONE) tablet 12.5 mg, 12.5 mg, Oral, DAILY, Pb Patel MD, 12.5 mg at 10/06/22 0832    lidocaine (XYLOCAINE) 4 % cream, , Topical, PRN, Fadia HATCH MD, Given at 10/07/22 0546    therapeutic multivitamin (THERAGRAN) tablet 1 Tablet, 1 Tablet, Oral, DAILY, Fadia HATCH MD, 1 Tablet at 10/06/22 0831    ergocalciferol capsule 50,000 Units, 50,000 Units, Oral, Q7D, Pb Patel MD, 50,000 Units at 10/04/22 0915    guaiFENesin (ROBITUSSIN) 100 mg/5 mL oral liquid 100 mg, 100 mg, Oral, Q4H PRN, Fadia HATCH MD, 100 mg at 10/05/22 0301    lidocaine 4 % patch 1 Patch, 1 Patch, TransDERmal, Q24H, Yana De La Cruz MD, 1 Patch at 10/04/22 2208    [Held by provider] DOBUTamine (DOBUTREX) 500 mg/250 mL (2,000 mcg/mL) infusion, 1 mcg/kg/min, IntraVENous, CONTINUOUS, Pb Patel MD, Stopped at 10/05/22 1200    alteplase (CATHFLO) 1 mg in sterile water (preservative free) 1 mL injection, 1 mg, InterCATHeter, PRN, Fadia HATCH MD    HYDROmorphone (DILAUDID) injection 0.5 mg, 0.5 mg, IntraVENous, Q3H PRN, Alfonso Melendez MD, 0.5 mg at 10/06/22 2219    sodium chloride (NS) flush 5-40 mL, 5-40 mL, IntraVENous, Q8H, Jarod Liu DO, 10 mL at 10/06/22 1755    sodium chloride (NS) flush 5-40 mL, 5-40 mL, IntraVENous, PRN, Taz Gutierrez DO acetaminophen (TYLENOL) tablet 650 mg, 650 mg, Oral, Q6H PRN, 650 mg at 10/07/22 0631 **OR** acetaminophen (TYLENOL) suppository 650 mg, 650 mg, Rectal, Q6H PRN, Mitcheal Cane, DO    polyethylene glycol (MIRALAX) packet 17 g, 17 g, Oral, DAILY, Mitcheal Cane, DO, 17 g at 10/06/22 2465    senna-docusate (PERICOLACE) 8.6-50 mg per tablet 1 Tablet, 1 Tablet, Oral, BID, Mitcheal Cane, DO, 1 Tablet at 10/06/22 2113    magnesium hydroxide (MILK OF MAGNESIA) 400 mg/5 mL oral suspension 30 mL, 30 mL, Oral, DAILY PRN, Mitcheal Cane, DO    sodium phosphate (FLEET'S) enema 1 Enema, 1 Enema, Rectal, DAILY PRN, Mitcheal Cane, DO    ondansetron [facility]) injection 4 mg, 4 mg, IntraVENous, Q6H PRN, Mitcheal Cane, DO, 4 mg at 10/05/22 1152    chlorhexidine (PERIDEX) 0.12 % mouthwash 15 mL, 15 mL, Oral, Q12H, Mitcheal Cane, DO, 15 mL at 10/06/22 2114    PATIENT CARE TEAM:  Patient Care Team:  Ernestine Johns MD as PCP - General (Family Medicine)  Ernestine Johns MD as PCP - [facility] Provider     Thank you for allowing me to participate in this patient's care.     Domenica Garcia, BOB   [address] [place] [address], [address]  Phone: [phone]    Total Patient Care Time: 40 minutes

## 2022-10-07 NOTE — PROGRESS NOTES
Problem: Self Care Deficits Care Plan (Adult)  Goal: *Acute Goals and Plan of Care (Insert Text)  Description:   FUNCTIONAL STATUS PRIOR TO ADMISSION: Patient was independent and active without use of DME. Completed IADL independently with transportation assistance from family/. Baseline A&Ox3-4, occasional memory deficits. HOME SUPPORT: Lived with  in 2 story home, has supportive family nearby who assist with IADL. Occupational Therapy Goals  Initiated 10/4/2022, re-evaluation post ICD 10/7/22  1. Patient will perform grooming with supervision/set-up within 7 day(s). 2.  Patient will perform upper body dressing while adhering to ICD precautions with supervision/set-up within 7 day(s). 3.  Patient will perform lower body dressing with supervision/set-up within 7 day(s). 4.  Patient will perform all aspects of toileting with adhering to ICD precautions minimal assistance/contact guard assist within 7 day(s). 5.  Patient will utilize energy conservation techniques during functional activities with verbal cues within 7 day(s). Outcome: Progressing Towards Goal    OCCUPATIONAL THERAPY RE-EVALUATION  Patient: Ruthie Espana (62 y.o. female)  Date: 10/7/2022  Diagnosis: Cardiac arrest ([facility] [address] [I46.9] <principal problem not specified>  Procedure(s) (LRB):  INSERT ICD SINGLE (N/A) 1 Day Post-Op  Precautions: Other (comment), Fall (PPM precautions)  Chart, occupational therapy assessment, plan of care, and goals were reviewed. ASSESSMENT  Based on the objective data described below, new ICD L UE precautions, short term memory loss due to dementia, decreased safety awareness, and generalized weakness during ADLs and functional mobility. Pt required intermittent verbal cues during session to adhere to ICD precautions L UE during functional transfers and ADLs. Min A for toilet transfer due to low height, setup to perform hygiene seated.  She is currently needing CGA-  min A with RW, CGA to min A ADLs. Recommend HHOT/PT with increased family assist/supervision 2* L UE precautions, fall risk and dementia. Current Level of Function Impacting Discharge (ADLs): CGA-min A, L UE ICD    Other factors to consider for discharge: from home with          PLAN :  Recommendations and Planned Interventions: self care training, functional mobility training, therapeutic exercise, balance training, therapeutic activities, endurance activities, patient education, and home safety training    Frequency/Duration: Patient will be followed by occupational therapy 4 times a week to address goals. Recommend with staff: OOB to chair, BSC with RW A x 1    Recommend next OT session: ADLs per POC    Recommendation for discharge: (in order for the patient to meet his/her long term goals)  Occupational therapy at least 2 days/week in the home AND ensure assist and/or supervision for safety with ADLs, transfers, adherence to ICD precautions L UE    This discharge recommendation:  Has been made in collaboration with the attending provider and/or case management    Equipment recommendations for successful discharge (if) home: may need RW       SUBJECTIVE:   Patient stated what happened to me?     OBJECTIVE DATA SUMMARY:   Hospital course since last seen and reason for reevaluation: pt had ICD placed 10/6    Cognitive/Behavioral Status:  Neurologic State: Alert;Confused  Orientation Level: Disoriented to situation;Oriented to person;Oriented to place  Cognition: Memory loss;Decreased attention/concentration             Skin: L ICD bandage C/D/I    Edema: None noted    Hearing:   Auditory  Auditory Impairment: None    Vision/Perceptual:                           Acuity: Within Defined Limits         Range of Motion:    AROM: Generally decreased, functional                         Strength:    Strength: Generally decreased, functional                Coordination:  Coordination: Generally decreased, functional  Fine Motor Skills-Upper: Left Intact; Right Intact    Gross Motor Skills-Upper: Left Intact; Right Intact    Tone & Sensation:    Tone: Normal  Sensation: Intact                        Functional Mobility and Transfers for ADLs:  Bed Mobility:  Sit to Supine: Minimum assistance  Scooting: Contact guard assistance    Transfers:  Sit to Stand: Contact guard assistance          Balance:  Sitting: Intact; Without support   Standing: Impaired; With support   Standing - Static: Constant support; Fair  Standing - Dynamic : Constant support; Fair    ADL Assessment:  Feeding: Setup    Oral Facial Hygiene/Grooming: Contact guard assistance    Bathing: Contact guard assistance    Type of Bath: Chlorhexidine (CHG)    Upper Body Dressing: Minimum assistance    Lower Body Dressing: Minimum assistance    Toileting: Minimum assistance                ADL Intervention and task modifications:                 Type of Bath: Chlorhexidine (CHG)          Pt and dtr educated on L UE ICD precautions. Pt without sling donned and RN reported pt keeps removing. Pain:  Pain along L side, improved with activity     Activity Tolerance:   Good    After treatment patient left in no apparent distress:   Supine in bed, Call bell within reach, and Caregiver / family present    COMMUNICATION/COLLABORATION:   The patients plan of care was discussed with: Physical therapist and Registered nurse.      Katelin Treadwell OT  Time Calculation: 26 mins

## 2022-10-07 NOTE — PROGRESS NOTES
[facility]     ICU Transfer/Accept Summary     [place] [address] [place]  DATE OF TRANSFER: 10/7/2022       PATIENT ID: Soo Pina  MRN: [id number]   YOB: 1945    PRIMARY CARE PROVIDER: Alka Ramos MD   DATE OF ADMISSION: 10/2/2022  2:33 AM    ATTENDING PHYSICIAN: Dominick Esparza MD  CONSULTATIONS:   IP CONSULT TO CARDIOLOGY  IP CONSULT TO ADVANCED HEART FAILURE  IP CONSULT TO ELECTROPHYSIOLOGY    PROCEDURES/SURGERIES:   Procedure(s):  INSERT ICD SINGLE    REASON FOR ADMISSION:V Fib cardiac arrest    HOSPITAL PROBLEM LIST:  Patient Active Problem List   Diagnosis Code    Cardiac arrest ([facility] [address] Y28.1    Acute systolic heart failure [facility]) I50.21                   Hospitalist Progress Note  Dominick Esparza MD  Answering service: [phone] from in house phone        Date of Service:  10/7/2022  NAME:  Soo Pina  :    MRN:  [id number]      Admission Summary:   77F with Alzheimer disease, HTN, presenting after witnessed cardiac arrest at home.  immediately started CPR, EMS then found Vifb with Shock x1, followed by asystole. ROSC < 5minutes reported. EMS transported to [facility] , [place] with pulse for 30 seconds started on amio gtt. CTH negative. Moved to [facility] CCU for further management.  Patient is post heart cath and AICD placement and off inotropes and transferred to the hospitalist team in the early AM hours of 10/7/22 as an ICU downgrade       Interval history / Subjective:   Patient seen and examined, denies chest pain or shortness of breath  She has no complaints for me and intensivist reporting only issue overnight was continued hypotension on midodrine Q8 with management of cardiac issues by cardiology and advanced heart failure team     Assessment & Plan:      Cardiac  CHF- new diagnosis of dilated cardiomyopathy, LVEF 10-15%, stage C, NYHA class IIIA    off dobutamine now but still hypotensive and on midodrine Q8  Heart cath 10/6/22 with following results= Mild one vessel CAD, Rt dominant   Severe LV systolic dysfunction, EF 15 to 20%; Elevated LVEDP; Mild pulmonary HTN  10/6/22 - ICD implant for Cardiomyopathy LVEF =10 %  & Secondary prevention post cardiac arrest for V fib  Currently on diuretics and not on rate control meds  Management per cardiology and AHF team     Pulm- stable on RA     GI- Diet as tolerated     Renal- creatinine mildly elevated post procedures and heart cath and after starting diuretics     Heme- Impressive drop in plt count - heparin gtt d/madiha 10/4, plt count improving, HIT negative  - anemia NOS- monitor Hb      UA positive on admission, completed ceftriaxone  Hx of dementia- no evidence of ICU delerium     OT/PT eval and CM consult for DC planning    Code status: Full  Prophylaxis: SCDs  Care Plan discussed with: intensivist  Anticipated Disposition: 24-48hrs     Hospital Problems  Never Reviewed            Codes Class Noted POA    Acute systolic heart failure ([facility] [address] ICD-10-CM: I50.21  ICD-9-CM: 428.21  10/4/2022 Unknown        Cardiac arrest [facility]) ICD-10-CM: I46.9  ICD-9-CM: 427.5  10/1/2022 Unknown             Review of Systems:   A comprehensive review of systems was negative except for that written in the HPI. Vital Signs:    Last 24hrs VS reviewed since prior progress note.  Most recent are:  Visit Vitals  /61   Pulse 90   Temp 98.6 °F (37 °C)   Resp 24   Ht 5' 2.99\" (1.6 m)   Wt 48.9 kg (107 lb 12.9 oz)   SpO2 98%   BMI 19.10 kg/m²     Patient Vitals for the past 24 hrs:   Temp Pulse Resp BP SpO2   10/07/22 0700 -- 90 24 118/61 --   10/07/22 0600 -- 96 21 117/70 98 %   10/07/22 0500 -- 98 21 -- 99 %   10/07/22 0400 98.6 °F (37 °C) 94 21 139/87 97 %   10/07/22 0300 -- 95 22 133/84 100 %   10/07/22 0200 -- 88 14 114/64 98 %   10/07/22 0100 -- 85 14 130/86 100 %   10/07/22 0000 98 °F (36.7 °C) 94 19 126/80 99 %   10/06/22 2300 -- 94 19 129/81 100 %   10/06/22 2200 -- 99 17 (!) 142/90 100 %   10/06/22 2100 -- 94 21 129/71 --   10/06/22 2000 98 °F (36.7 °C) 98 22 117/67 --   10/06/22 1900 -- 91 24 134/87 --   10/06/22 1800 98.6 °F (37 °C) 88 19 138/85 100 %   10/06/22 1545 -- 83 14 133/81 92 %   10/06/22 1530 -- 90 16 138/84 90 %   10/06/22 1515 -- 87 16 137/89 95 %   10/06/22 1500 -- 87 14 136/87 95 %   10/06/22 1445 -- 93 17 139/87 --   10/06/22 1430 -- 87 16 (!) 140/88 98 %   10/06/22 1415 -- 88 24 139/78 96 %   10/06/22 1410 -- 93 25 135/86 98 %   10/06/22 1358 -- 93 16 (!) 145/90 --   10/06/22 1354 -- 92 20 (!) 144/85 100 %   10/06/22 1200 98.5 °F (36.9 °C) 88 20 136/79 97 %   10/06/22 1100 -- 88 16 139/82 97 %   10/06/22 1014 -- -- -- 135/84 --   10/06/22 1000 -- 82 16 -- 99 %   10/06/22 0900 -- 95 20 (!) 143/81 96 %        Intake/Output Summary (Last 24 hours) at 10/7/2022 0804  Last data filed at 10/7/2022 0645  Gross per 24 hour   Intake 50 ml   Output 1000 ml   Net -950 ml        Physical Examination:     I had a face to face encounter with this patient and independently examined them on 10/7/2022 as outlined below:          Constitutional:  No acute distress, cooperative, pleasant    HEENT:  EOMI, Oral mucosa moist, oropharynx benign. Resp:  CTA bilaterally. No wheezing/rhonchi/rales. No accessory muscle use. CV:  Regular rhythm, normal rate, no murmurs, gallops, rubs    GI:  Soft, non distended, non tender. normoactive bowel sounds,    Musculoskeletal:  No edema, warm, 2+ pulses throughout    Neurologic:  Moves all extremities. AAOx3, CN II-XII reviewed  Psych: flat affect, normal mood            Data Review:    Review and/or order of clinical lab test  Review and/or order of tests in the radiology section of CPT  Review and/or order of tests in the medicine section of CPT  CXR Results                   10/06/22 2050  XR CHEST PORT Final result    Impression:  1.  Cardiac assist device in the left chest has a single lead which projects to   terminate in the right ventricle. Narrative:  INDICATION: On return to floor. . Evaluate for pneumothorax   Additional history:   COMPARISON: Previous chest xray, 10/4/2022 and 10/2/2022. LIMITATIONS: Portable technique. Pinky Angles FINDINGS: Single frontal view of the chest.    .   Lines/tubes/surgical: The cardiac assist device in the left chest has a single   lead which projects to terminate in the right ventricle. Interval removal of a   right IJ approach catheter. Heart/mediastinum: Unremarkable. Lungs/pleura:  No focal consolidation or mass. No visualized pleural effusion or   pneumothorax. Additional Comments: None. .              Results       Procedure Component Value Units Date/Time    CULTURE, BLOOD #1 [[id number]] Collected: 10/02/22 0653    Order Status: Completed Specimen: Blood Updated: 10/06/22 0553     Special Requests: NO SPECIAL REQUESTS        Culture result: NO GROWTH 4 DAYS       CULTURE, BLOOD #2 [[id number]] Collected: 10/02/22 0653    Order Status: Completed Specimen: Blood Updated: 10/06/22 0553     Special Requests: NO SPECIAL REQUESTS        Culture result: NO GROWTH 4 DAYS       URINE CULTURE HOLD SAMPLE [[id number]] Collected: 10/01/22 2156    Order Status: Completed Specimen: Urine from Serum Updated: 10/01/22 2203     Urine culture hold       Urine on hold in Microbiology dept for 2 days. If unpreserved urine is submitted, it cannot be used for addtional testing after 24 hours, recollection will be required.                    Labs:     Recent Labs     10/07/22  0525 10/06/22  0503   WBC 6.8 5.2   HGB 10.7* 10.2*   HCT 33.6* 32.0*   * 89*     Recent Labs     10/07/22  0525 10/06/22  0503 10/05/22  0459    140 131*   K 4.9 4.4 4.4    109* 101   CO2 24 24 25   BUN 16 18 22*   CREA 1.04* 1.00 0.89    86 91   CA 9.4 9.1 8.7   MG 1.8 1.8 2.1   PHOS 2.7 2.8 2.2*     Recent Labs     10/07/22  0525 10/06/22  0503 10/05/22  1632 ALT 38 46 53    93 101   TBILI 1.0 1.0 1.1*   TP 6.0* 5.2* 5.3*   ALB 3.3* 3.0* 3.1*   GLOB 2.7 2.2 2.2     Recent Labs     10/07/22  0525 10/06/22  0503 10/05/22  1632 10/05/22  0459   INR 1.1 1.1  --  1.1   PTP 11.7* 11.9*  --  11.7*   APTT 25.1 26.4 28.8 32.4*      No results for input(s): FE, TIBC, PSAT, FERR in the last 72 hours. No results found for: FOL, RBCF   No results for input(s): PH, PCO2, PO2 in the last 72 hours. No results for input(s): CPK, CKNDX, TROIQ in the last 72 hours.     No lab exists for component: CPKMB  No results found for: CHOL, CHOLX, CHLST, CHOLV, HDL, HDLP, LDL, LDLC, DLDLP, TGLX, TRIGL, TRIGP, CHHD, CHHDX  Lab Results   Component Value Date/Time    Glucose (POC) 126 (H) 10/03/2022 02:10 PM    Glucose (POC) 143 (H) 10/03/2022 04:20 AM    Glucose (POC) 192 (H) 10/02/2022 11:56 PM    Glucose (POC) 191 (H) 10/02/2022 06:36 PM    Glucose,  (H) 10/01/2022 09:18 PM     Lab Results   Component Value Date/Time    Color YELLOW/STRAW 10/01/2022 09:56 PM    Appearance HAZY (A) 10/01/2022 09:56 PM    Specific gravity 1.020 10/01/2022 09:56 PM    pH (UA) 6.0 10/01/2022 09:56 PM    Protein 100 (A) 10/01/2022 09:56 PM    Glucose Negative 10/01/2022 09:56 PM    Ketone Negative 10/01/2022 09:56 PM    Bilirubin Negative 10/01/2022 09:56 PM    Urobilinogen 0.2 10/01/2022 09:56 PM    Nitrites Negative 10/01/2022 09:56 PM    Leukocyte Esterase Negative 10/01/2022 09:56 PM    Epithelial cells MODERATE (A) 10/01/2022 09:56 PM    Bacteria 2+ (A) 10/01/2022 09:56 PM    WBC 10-20 10/01/2022 09:56 PM    RBC 5-10 10/01/2022 09:56 PM         Medications Reviewed:     Current Facility-Administered Medications   Medication Dose Route Frequency    bumetanide (BUMEX) injection 1 mg  1 mg IntraVENous BID    [START ON 10/8/2022] bumetanide (BUMEX) tablet 1 mg  1 mg Oral DAILY    midodrine (PROAMATINE) tablet 15 mg  15 mg Oral Q8H    sodium chloride (NS) flush 5-40 mL  5-40 mL IntraVENous Q8H    sodium chloride (NS) flush 5-40 mL  5-40 mL IntraVENous PRN    HYDROcodone-acetaminophen (NORCO) 5-325 mg per tablet 1 Tablet  1 Tablet Oral Q4H PRN    spironolactone (ALDACTONE) tablet 12.5 mg  12.5 mg Oral DAILY    lidocaine (XYLOCAINE) 4 % cream   Topical PRN    therapeutic multivitamin (THERAGRAN) tablet 1 Tablet  1 Tablet Oral DAILY    ergocalciferol capsule 50,000 Units  50,000 Units Oral Q7D    guaiFENesin (ROBITUSSIN) 100 mg/5 mL oral liquid 100 mg  100 mg Oral Q4H PRN    lidocaine 4 % patch 1 Patch  1 Patch TransDERmal Q24H    [Held by provider] DOBUTamine (DOBUTREX) 500 mg/250 mL (2,000 mcg/mL) infusion  1 mcg/kg/min IntraVENous CONTINUOUS    alteplase (CATHFLO) 1 mg in sterile water (preservative free) 1 mL injection  1 mg InterCATHeter PRN    HYDROmorphone (DILAUDID) injection 0.5 mg  0.5 mg IntraVENous Q3H PRN    sodium chloride (NS) flush 5-40 mL  5-40 mL IntraVENous Q8H    sodium chloride (NS) flush 5-40 mL  5-40 mL IntraVENous PRN    acetaminophen (TYLENOL) tablet 650 mg  650 mg Oral Q6H PRN    Or    acetaminophen (TYLENOL) suppository 650 mg  650 mg Rectal Q6H PRN    polyethylene glycol (MIRALAX) packet 17 g  17 g Oral DAILY    senna-docusate (PERICOLACE) 8.6-50 mg per tablet 1 Tablet  1 Tablet Oral BID    magnesium hydroxide (MILK OF MAGNESIA) 400 mg/5 mL oral suspension 30 mL  30 mL Oral DAILY PRN    sodium phosphate (FLEET'S) enema 1 Enema  1 Enema Rectal DAILY PRN    ondansetron (ZOFRAN) injection 4 mg  4 mg IntraVENous Q6H PRN    chlorhexidine (PERIDEX) 0.12 % mouthwash 15 mL  15 mL Oral Q12H     ______________________________________________________________________  EXPECTED LENGTH OF STAY: 4d 2h  ACTUAL LENGTH OF STAY:          5                 Pam Riedel, MD

## 2022-10-08 ENCOUNTER — APPOINTMENT (OUTPATIENT)
Dept: GENERAL RADIOLOGY | Age: 77
DRG: 222 | End: 2022-10-08
Attending: EMERGENCY MEDICINE
Payer: MEDICARE

## 2022-10-08 LAB
ALBUMIN SERPL-MCNC: 3.3 G/DL (ref 3.5–5)
ALBUMIN/GLOB SERPL: 1.4 {RATIO} (ref 1.1–2.2)
ALP SERPL-CCNC: 86 U/L (ref 45–117)
ALT SERPL-CCNC: 27 U/L (ref 12–78)
ANION GAP SERPL CALC-SCNC: 9 MMOL/L (ref 5–15)
AST SERPL-CCNC: 20 U/L (ref 15–37)
BASOPHILS # BLD: 0 K/UL (ref 0–0.1)
BASOPHILS NFR BLD: 0 % (ref 0–1)
BILIRUB SERPL-MCNC: 1.1 MG/DL (ref 0.2–1)
BUN SERPL-MCNC: 17 MG/DL (ref 6–20)
BUN/CREAT SERPL: 15 (ref 12–20)
CALCIUM SERPL-MCNC: 9.1 MG/DL (ref 8.5–10.1)
CHLORIDE SERPL-SCNC: 101 MMOL/L (ref 97–108)
CO2 SERPL-SCNC: 25 MMOL/L (ref 21–32)
CREAT SERPL-MCNC: 1.16 MG/DL (ref 0.55–1.02)
DIFFERENTIAL METHOD BLD: ABNORMAL
EOSINOPHIL # BLD: 0.1 K/UL (ref 0–0.4)
EOSINOPHIL NFR BLD: 1 % (ref 0–7)
ERYTHROCYTE [DISTWIDTH] IN BLOOD BY AUTOMATED COUNT: 12.5 % (ref 11.5–14.5)
GLOBULIN SER CALC-MCNC: 2.4 G/DL (ref 2–4)
GLUCOSE SERPL-MCNC: 110 MG/DL (ref 65–100)
HCT VFR BLD AUTO: 32.2 % (ref 35–47)
HGB BLD-MCNC: 10.6 G/DL (ref 11.5–16)
IMM GRANULOCYTES # BLD AUTO: 0.1 K/UL (ref 0–0.04)
IMM GRANULOCYTES NFR BLD AUTO: 1 % (ref 0–0.5)
INR PPP: 1.2 (ref 0.9–1.1)
LACTATE SERPL-SCNC: 1.4 MMOL/L (ref 0.4–2)
LYMPHOCYTES # BLD: 1 K/UL (ref 0.8–3.5)
LYMPHOCYTES NFR BLD: 15 % (ref 12–49)
MAGNESIUM SERPL-MCNC: 1.4 MG/DL (ref 1.6–2.4)
MCH RBC QN AUTO: 24.9 PG (ref 26–34)
MCHC RBC AUTO-ENTMCNC: 32.9 G/DL (ref 30–36.5)
MCV RBC AUTO: 75.8 FL (ref 80–99)
MONOCYTES # BLD: 1.2 K/UL (ref 0–1)
MONOCYTES NFR BLD: 19 % (ref 5–13)
NEUTS SEG # BLD: 4 K/UL (ref 1.8–8)
NEUTS SEG NFR BLD: 64 % (ref 32–75)
NRBC # BLD: 0.04 K/UL (ref 0–0.01)
NRBC BLD-RTO: 0.6 PER 100 WBC
PHOSPHATE SERPL-MCNC: 2.1 MG/DL (ref 2.6–4.7)
PLATELET # BLD AUTO: 147 K/UL (ref 150–400)
PMV BLD AUTO: 12.5 FL (ref 8.9–12.9)
POTASSIUM SERPL-SCNC: 4.2 MMOL/L (ref 3.5–5.1)
PROT SERPL-MCNC: 5.7 G/DL (ref 6.4–8.2)
PROTHROMBIN TIME: 12.8 SEC (ref 9–11.1)
RBC # BLD AUTO: 4.25 M/UL (ref 3.8–5.2)
SODIUM SERPL-SCNC: 135 MMOL/L (ref 136–145)
WBC # BLD AUTO: 6.3 K/UL (ref 3.6–11)

## 2022-10-08 PROCEDURE — 74011250637 HC RX REV CODE- 250/637: Performed by: STUDENT IN AN ORGANIZED HEALTH CARE EDUCATION/TRAINING PROGRAM

## 2022-10-08 PROCEDURE — 74011250636 HC RX REV CODE- 250/636: Performed by: FAMILY MEDICINE

## 2022-10-08 PROCEDURE — 65270000046 HC RM TELEMETRY

## 2022-10-08 PROCEDURE — 85610 PROTHROMBIN TIME: CPT

## 2022-10-08 PROCEDURE — 71045 X-RAY EXAM CHEST 1 VIEW: CPT

## 2022-10-08 PROCEDURE — 74011250637 HC RX REV CODE- 250/637: Performed by: INTERNAL MEDICINE

## 2022-10-08 PROCEDURE — 83735 ASSAY OF MAGNESIUM: CPT

## 2022-10-08 PROCEDURE — 74011000250 HC RX REV CODE- 250: Performed by: STUDENT IN AN ORGANIZED HEALTH CARE EDUCATION/TRAINING PROGRAM

## 2022-10-08 PROCEDURE — 36415 COLL VENOUS BLD VENIPUNCTURE: CPT

## 2022-10-08 PROCEDURE — 80053 COMPREHEN METABOLIC PANEL: CPT

## 2022-10-08 PROCEDURE — 74011000250 HC RX REV CODE- 250: Performed by: INTERNAL MEDICINE

## 2022-10-08 PROCEDURE — 99233 SBSQ HOSP IP/OBS HIGH 50: CPT | Performed by: NURSE PRACTITIONER

## 2022-10-08 PROCEDURE — 85025 COMPLETE CBC W/AUTO DIFF WBC: CPT

## 2022-10-08 PROCEDURE — 74011250636 HC RX REV CODE- 250/636: Performed by: EMERGENCY MEDICINE

## 2022-10-08 PROCEDURE — 74011250637 HC RX REV CODE- 250/637: Performed by: NURSE PRACTITIONER

## 2022-10-08 PROCEDURE — 84100 ASSAY OF PHOSPHORUS: CPT

## 2022-10-08 PROCEDURE — 74011250637 HC RX REV CODE- 250/637: Performed by: EMERGENCY MEDICINE

## 2022-10-08 PROCEDURE — 74011000250 HC RX REV CODE- 250: Performed by: ANESTHESIOLOGY

## 2022-10-08 PROCEDURE — 83605 ASSAY OF LACTIC ACID: CPT

## 2022-10-08 RX ORDER — LANOLIN ALCOHOL/MO/W.PET/CERES
400 CREAM (GRAM) TOPICAL DAILY
Status: DISCONTINUED | OUTPATIENT
Start: 2022-10-08 | End: 2022-10-13 | Stop reason: HOSPADM

## 2022-10-08 RX ORDER — MAGNESIUM SULFATE HEPTAHYDRATE 40 MG/ML
2 INJECTION, SOLUTION INTRAVENOUS ONCE
Status: COMPLETED | OUTPATIENT
Start: 2022-10-08 | End: 2022-10-09

## 2022-10-08 RX ORDER — MIDODRINE HYDROCHLORIDE 5 MG/1
10 TABLET ORAL EVERY 8 HOURS
Status: DISCONTINUED | OUTPATIENT
Start: 2022-10-08 | End: 2022-10-09

## 2022-10-08 RX ADMIN — MAGNESIUM OXIDE 400 MG (241.3 MG MAGNESIUM) TABLET 400 MG: TABLET at 09:02

## 2022-10-08 RX ADMIN — SPIRONOLACTONE 12.5 MG: 25 TABLET ORAL at 08:48

## 2022-10-08 RX ADMIN — SODIUM CHLORIDE, PRESERVATIVE FREE 10 ML: 5 INJECTION INTRAVENOUS at 06:59

## 2022-10-08 RX ADMIN — SENNOSIDES AND DOCUSATE SODIUM 1 TABLET: 50; 8.6 TABLET ORAL at 08:48

## 2022-10-08 RX ADMIN — MIDODRINE HYDROCHLORIDE 15 MG: 5 TABLET ORAL at 06:58

## 2022-10-08 RX ADMIN — THERA TABS 1 TABLET: TAB at 08:48

## 2022-10-08 RX ADMIN — SODIUM CHLORIDE, PRESERVATIVE FREE 10 ML: 5 INJECTION INTRAVENOUS at 13:33

## 2022-10-08 RX ADMIN — MAGNESIUM SULFATE HEPTAHYDRATE 2 G: 40 INJECTION, SOLUTION INTRAVENOUS at 08:59

## 2022-10-08 RX ADMIN — SODIUM CHLORIDE, PRESERVATIVE FREE 10 ML: 5 INJECTION INTRAVENOUS at 23:00

## 2022-10-08 RX ADMIN — SENNOSIDES AND DOCUSATE SODIUM 1 TABLET: 50; 8.6 TABLET ORAL at 23:01

## 2022-10-08 RX ADMIN — HYDROCODONE BITARTRATE AND ACETAMINOPHEN 1 TABLET: 5; 325 TABLET ORAL at 15:59

## 2022-10-08 RX ADMIN — HYDROCODONE BITARTRATE AND ACETAMINOPHEN 1 TABLET: 5; 325 TABLET ORAL at 23:01

## 2022-10-08 RX ADMIN — MIDODRINE HYDROCHLORIDE 10 MG: 5 TABLET ORAL at 13:32

## 2022-10-08 RX ADMIN — POLYETHYLENE GLYCOL 3350 17 G: 17 POWDER, FOR SOLUTION ORAL at 08:47

## 2022-10-08 RX ADMIN — HYDROMORPHONE HYDROCHLORIDE 0.5 MG: 1 INJECTION, SOLUTION INTRAMUSCULAR; INTRAVENOUS; SUBCUTANEOUS at 02:09

## 2022-10-08 NOTE — PROGRESS NOTES
[facility]  Inpatient Progress Note      Patient name: Andres Magana  Patient : 1945  Patient MRN: [id number]  Consulting MD: Milan Maddox MD  Date of service: 10/08/22    REASON FOR REFERRAL:  Management of cardiogenic shock     PLAN OF CARE:  64 y/o with h/o new diagnosis of dilated cardiomyopathy, LVEF 10-15%, stage C, NYHA class IIIA presents with VF cardiac arrest c/b acute renal and hepatic failure, extubated, AAOx4 and conversant thus unlikely significant anoxic brain injury with recovering hemodynamics and renal/hepatic function  Stable hemodynamics off Doutamine; hopefully LVEF and hemodynamics remain stable off inotropic support. poor candidate for home inotropes  LHC/RHC yesterday off inotropes with minimal CAD, PCWP 26 and CI of 2.3, will obtain non-invasive NICOM today to see if CI correlates     RECOMMENDATIONS:   midodrine to 10mg PO TID for now, will wean as tolerated  If BP stable after midodrine wean, will start low dose BBrx   If BP remains stable off inotropes will start lose dose Entresto  Cont spironolactone 12.5mg daily  Cannot tolerate SGLT2i inhibitor due to UTI- will consider as OP when UTI clears   Continue high dose vitamin D weekly x 12 weeks ( 2023)  Cont Bumex 1mg PO bumex- hold today   Not on allopurinol or uloric, uric acid 3.1  Discontinued heparin due to thrombocytopenia; PVD prophylaxis per primary team  Not on aspirin  Not on statins  Repeat TTE before discharge   Will need IP evaluation for JOHANA  S/o AICD for SCD prevention per Dr. Jack Hagen HF screening labs: gammopathy profile, hepatitis profile, KENNY pending  Consider genetic testing as OP  Nutritionist consult and heart failure education when patient recovers   Recommend flu, covid and pneumonia vaccinations at discharge     Patient assessed. High suspicion of sleep apnea due to the following reasons.  Will refer for sleep evaluation. [x] Heart Failure  [] Hypertention  [] Atrial Fibrillation  [] BMI > 30  [] History of stroke  [] Diabetes  [] Heavy snoring  [] Witnessed apnea  [] Hypoxemia      Remainder of care per primary team     IMPRESSION:  Sinus bradycardia, resolved  Acute on chronic HFrEF heart failure  Stage C, NYHA class IV symptoms  Dilated cardiomyopathy, LVEF 10-15%  C/b VF cardiac arrest  C/b cardiogenic shock  C/b renal and hepatic failure  Cardiac risk factors:  HTN  Leukocytosis, improved  Likely UTI (culture not obtained)  Neurocognitive dysfunction  Alzheimer disease  Cannot drive  Memory loss  Vitamin D deficiency     LIFE GOALS:  Lifestyle goals reviewed with the patient. Patient's personal goals include: TBD  Important upcoming milestones or family events: none at this time  The patient identifies the following as important for living well: TBD     INTERVAL HISTORY:  Afebrile  SBP 120s, HR 60-80s SR  Weight 109lbs  I/O net negative inaccurate   CBC stable   Plt improving to 140s  K 4.2  Mg 1.4  Cr 1.16  TBili 1.1  Weaning midodrine      HISTORY OF PRESENT ILLNESS:  Alicia Alberto is a 65 y.o. female with newly diagnosed few weeks ago severe cardiomyopathy LVEF 25-30% was awaiting CT angiogram had cardiac arrest and collapsed at dinner table. CPR was initiated by her  right away and then paramedic within 7 minutes. She was transferred to [facility] [place] initiated on code ice protocol. She was started on diuretics due to significant dyspnea, family described NYHA class IIIB symptoms, which she took for 7 days. CARDIAC IMAGING:  Echo (10/2/22)    Left Ventricle: Severely reduced left ventricular systolic function with a visually estimated EF of 10 -15%. Left ventricle is severely dilated. Normal wall thickness. Severe global hypokinesis present. Right Ventricle: Moderately reduced systolic function. Left Atrium: Left atrium is mildly dilated. Right Atrium: Right atrium is dilated.     Technical qualifiers: Echo study was technically difficult. EKG (10/3/22) NSR, septal infarct  LHC 10/6/22 mild 1V CAD     HEMODYNAMICS:  RHC 10/6/22 PAP 53/24/35, RA 9, PCWP 26, CI 2.3   CPEST not done  6MW not done    PHYSICAL EXAM:  Visit Vitals  /74 (BP 1 Location: Right upper arm, BP Patient Position: At rest)   Pulse 68   Temp 97.5 °F (36.4 °C)   Resp 10   Ht 5' 2.99\" (1.6 m)   Wt 109 lb 2 oz (49.5 kg)   SpO2 96%   BMI 19.34 kg/m²     Physical Exam  Vitals and nursing note reviewed. Constitutional:       Appearance: Normal appearance. She is not ill-appearing. Cardiovascular:      Rate and Rhythm: Normal rate and regular rhythm. Pulses: Normal pulses. Heart sounds: Normal heart sounds. Pulmonary:      Effort: Pulmonary effort is normal.      Breath sounds: Normal breath sounds. No rhonchi. Abdominal:      General: There is no distension. Palpations: Abdomen is soft. Musculoskeletal:         General: No swelling. Skin:     General: Skin is warm and dry. Neurological:      General: No focal deficit present. Mental Status: She is alert and oriented to person, place, and time. Psychiatric:         Mood and Affect: Mood normal.        REVIEW OF SYSTEMS:  Review of Systems   Constitutional:  Negative for chills, fever and malaise/fatigue. Respiratory:  Negative for wheezing. Cardiovascular:  Negative for chest pain. Gastrointestinal:  Negative for heartburn and nausea. Musculoskeletal:  Negative for falls. R breast tenderness    Neurological:  Negative for dizziness and headaches. Psychiatric/Behavioral:  Negative for depression.        PAST MEDICAL HISTORY:  Past Medical History:   Diagnosis Date    Arthritis     Chronic pain     \"my right side has been hurting for 3-4 months\"    Hypertension        PAST SURGICAL HISTORY:  Past Surgical History:   Procedure Laterality Date    HX BREAST BIOPSY Bilateral     neg    HX BREAST REDUCTION Bilateral 2000    HX GI  2004 colon resection after perforation during ovarian cyst removal    HX HEENT      skin cyst removed from neck    HX HEENT      uvuloplasty    HX HYSTERECTOMY      and bladder repair    HX SHOULDER ARTHROSCOPY      right    HX UROLOGICAL      bladder repair during hysterectomy       FAMILY HISTORY:  No family history on file. SOCIAL HISTORY:  Social History     Socioeconomic History    Marital status:    Tobacco Use    Smoking status: Former     Packs/day: 0.25     Types: Cigarettes     Quit date:      Years since quittin.8    Smokeless tobacco: Never   Substance and Sexual Activity    Alcohol use: No    Drug use: No       LABORATORY RESULTS:     Labs Latest Ref Rng & Units 10/8/2022 10/7/2022 10/6/2022 10/5/2022 10/5/2022 10/4/2022 10/3/2022   WBC 3.6 - 11.0 K/uL 6.3 6.8 5.2 - 5.6 7.1 14. 2(H)   RBC 3.80 - 5.20 M/uL 4.25 4.38 4.12 - 3.50(L) 3.61(L) 4.86   Hemoglobin 11.5 - 16.0 g/dL 10. 6(L) 10. 7(L) 10. 2(L) - 8. 6(L) 9.0(L) 11.7   Hematocrit 35.0 - 47.0 % 32. 2(L) 33. 6(L) 32. 0(L) - 26. 7(L) 28. 4(L) 37.6   MCV 80.0 - 99.0 FL 75. 8(L) 76. 7(L) 77. 7(L) - 76. 3(L) 78. 7(L) 77. 4(L)   Platelets 674 - 369 K/uL 147(L) 112(L) 89(L) - 55(L) 39(LL) 66(L)   Lymphocytes 12 - 49 % 15 10(L) 17 - 12 9(L) 8(L)   Monocytes 5 - 13 % 19(H) 19(H) 16(H) - 11 10 6   Eosinophils 0 - 7 % 1 1 4 - 3 2 0   Basophils 0 - 1 % 0 0 0 - 0 0 0   Bands 0 - 6 % - - - - - - -   Albumin 3.5 - 5.0 g/dL 3. 3(L) 3. 3(L) 3.0(L) 3. 1(L) 2. 8(L) 2. 8(L) 3. 3(L)   Calcium 8.5 - 10.1 MG/DL 9.1 9.4 9.1 - 8.7 8.4(L) 8.6   Glucose 65 - 100 mg/dL 110(H) 100 86 - 91 92 161(H)   BUN 6 - 20 MG/DL 17 16 18 - 22(H) 26(H) 34(H)   Creatinine 0.55 - 1.02 MG/DL 1.16(H) 1.04(H) 1.00 - 0.89 0.98 1.41(H)   Sodium 136 - 145 mmol/L 135(L) 138 140 - 131(L) 133(L) 134(L)   Potassium 3.5 - 5.1 mmol/L 4.2 4.9 4.4 - 4.4 3.9 3.5   TSH 0.36 - 3.74 uIU/mL - - - - - 0.41 -   Some recent data might be hidden     Lab Results   Component Value Date/Time    TSH 0.41 10/04/2022 06:18 AM ALLERGY:  Allergies   Allergen Reactions    Iron Other (comments)     IV IRON only gave her convulsions        CURRENT MEDICATIONS:    Current Facility-Administered Medications:     magnesium sulfate 2 g/50 ml IVPB (premix or compounded), 2 g, IntraVENous, ONCE, Gena Ambriz MD    midodrine (PROAMATINE) tablet 10 mg, 10 mg, Oral, Q8H, Lyn, Norma B, NP    magnesium oxide (MAG-OX) tablet 400 mg, 400 mg, Oral, DAILY, Lyn, Norma B, NP    bumetanide (BUMEX) tablet 1 mg, 1 mg, Oral, DAILY, Lyn, Norma B, NP    sodium chloride (NS) flush 5-40 mL, 5-40 mL, IntraVENous, Q8H, Federico Rosario MD, 10 mL at 10/08/22 0659    sodium chloride (NS) flush 5-40 mL, 5-40 mL, IntraVENous, PRN, Marcellus Patches, Alfred Son MD    HYDROcodone-acetaminophen (NORCO) 5-325 mg per tablet 1 Tablet, 1 Tablet, Oral, Q4H PRN, Christina Calderón MD    spironolactone (ALDACTONE) tablet 12.5 mg, 12.5 mg, Oral, DAILY, Augusto Matt MD, 12.5 mg at 10/07/22 0919    lidocaine (XYLOCAINE) 4 % cream, , Topical, PRN, Maria Elena HATCH MD, Given at 10/07/22 1121    therapeutic multivitamin (THERAGRAN) tablet 1 Tablet, 1 Tablet, Oral, DAILY, Maria Elena HATCH MD, 1 Tablet at 10/07/22 0919    ergocalciferol capsule 50,000 Units, 50,000 Units, Oral, Q7D, Augusto Matt MD, 50,000 Units at 10/04/22 0915    guaiFENesin (ROBITUSSIN) 100 mg/5 mL oral liquid 100 mg, 100 mg, Oral, Q4H PRN, Maria Elena HATCH MD, 100 mg at 10/07/22 1121    lidocaine 4 % patch 1 Patch, 1 Patch, TransDERmal, Q24H, Darren Treviño MD, 1 Patch at 10/07/22 2141    alteplase (CATHFLO) 1 mg in sterile water (preservative free) 1 mL injection, 1 mg, InterCATHeter, PRN, Alfonso Garza MD    sodium chloride (NS) flush 5-40 mL, 5-40 mL, IntraVENous, Q8H, Jarod Liu, , 10 mL at 10/08/22 0659    sodium chloride (NS) flush 5-40 mL, 5-40 mL, IntraVENous, PRN, Flip Rodriguez DO    acetaminophen (TYLENOL) tablet 650 mg, 650 mg, Oral, Q6H PRN, 650 mg at 10/07/22 2140 **OR** acetaminophen (TYLENOL) suppository 650 mg, 650 mg, Rectal, Q6H PRN, Judith Prudent, DO    polyethylene glycol (MIRALAX) packet 17 g, 17 g, Oral, DAILY, Judith Prudent, DO, 17 g at 10/06/22 2790    senna-docusate (PERICOLACE) 8.6-50 mg per tablet 1 Tablet, 1 Tablet, Oral, BID, Judith Prudent, DO, 1 Tablet at 10/07/22 2140    magnesium hydroxide (MILK OF MAGNESIA) 400 mg/5 mL oral suspension 30 mL, 30 mL, Oral, DAILY PRN, Judith Prudent, DO    sodium phosphate (FLEET'S) enema 1 Enema, 1 Enema, Rectal, DAILY PRN, [place] Prudent, DO    ondansetron [facility]) injection 4 mg, 4 mg, IntraVENous, Q6H PRN, [place] Prudent, DO, 4 mg at 10/07/22 1121    chlorhexidine (PERIDEX) 0.12 % mouthwash 15 mL, 15 mL, Oral, Q12H, Judith Prudent, DO, 15 mL at 10/07/22 0516    PATIENT CARE TEAM:  Patient Care Team:  Alka Ramos MD as PCP - General (Family Medicine)  Alka Ramos MD as PCP - REHABILITATION [facility][1]     Thank you for allowing me to participate in this patient's care.     Dante Valiente NP   [address] [place] [address], [address]  Phone: [phone]    Total Patient Care Time: 40 minutes

## 2022-10-08 NOTE — PROGRESS NOTES
Cardiology Progress Note                                        Admit Date: 10/2/2022    Assessment/Plan:     S/p cardiac arrest; now s/p ICD, normal function  CHF; improving; RHC with elevated LVEDP and PASP  Thrombocytopenia; coming up   Cardiomyopathy; severe; weaning midodrine and adding GDMT as able    Sid Stark is a 65 y.o. female with     PROBLEM LIST:  Patient Active Problem List    Diagnosis Date Noted    Acute systolic heart failure ([facility] [address] 10/04/2022    Cardiac arrest ([facility] 10/01/2022         Subjective:     Sid Stark reports none. Visit Vitals  /68   Pulse 66   Temp 97.5 °F (36.4 °C)   Resp 23   Ht 5' 2.99\" (1.6 m)   Wt 49.5 kg (109 lb 2 oz)   SpO2 97%   BMI 19.34 kg/m²       Intake/Output Summary (Last 24 hours) at 10/8/2022 1142  Last data filed at 10/8/2022 1000  Gross per 24 hour   Intake 1220 ml   Output 1000 ml   Net 220 ml         Objective:      Physical Exam:  HEENT: Perrla, EOMI  Neck: No JVD,  No thyroidmegaly  Resp: CTA bilaterally;  No wheezes or rales  CV: RRR s1s2 No murmur, + s3  Abd:Soft, Nontender  Ext: No edema  Neuro: Alert and oriented; Nonfocal  Skin: Warm, Dry, Intact  Pulses: 2+ DP/PT/Rad      Telemetry: normal sinus rhythm    Current Facility-Administered Medications   Medication Dose Route Frequency    midodrine (PROAMATINE) tablet 10 mg  10 mg Oral Q8H    magnesium oxide (MAG-OX) tablet 400 mg  400 mg Oral DAILY    [Held by provider] bumetanide (BUMEX) tablet 1 mg  1 mg Oral DAILY    sodium chloride (NS) flush 5-40 mL  5-40 mL IntraVENous Q8H    sodium chloride (NS) flush 5-40 mL  5-40 mL IntraVENous PRN    HYDROcodone-acetaminophen (NORCO) 5-325 mg per tablet 1 Tablet  1 Tablet Oral Q4H PRN    spironolactone (ALDACTONE) tablet 12.5 mg  12.5 mg Oral DAILY    lidocaine (XYLOCAINE) 4 % cream   Topical PRN    therapeutic multivitamin (THERAGRAN) tablet 1 Tablet  1 Tablet Oral DAILY    ergocalciferol capsule 50,000 Units  50,000 Units Oral Q7D    guaiFENesin (ROBITUSSIN) 100 mg/5 mL oral liquid 100 mg  100 mg Oral Q4H PRN    lidocaine 4 % patch 1 Patch  1 Patch TransDERmal Q24H    alteplase (CATHFLO) 1 mg in sterile water (preservative free) 1 mL injection  1 mg InterCATHeter PRN    sodium chloride (NS) flush 5-40 mL  5-40 mL IntraVENous Q8H    sodium chloride (NS) flush 5-40 mL  5-40 mL IntraVENous PRN    acetaminophen (TYLENOL) tablet 650 mg  650 mg Oral Q6H PRN    Or    acetaminophen (TYLENOL) suppository 650 mg  650 mg Rectal Q6H PRN    polyethylene glycol (MIRALAX) packet 17 g  17 g Oral DAILY    senna-docusate (PERICOLACE) 8.6-50 mg per tablet 1 Tablet  1 Tablet Oral BID    magnesium hydroxide (MILK OF MAGNESIA) 400 mg/5 mL oral suspension 30 mL  30 mL Oral DAILY PRN    sodium phosphate (FLEET'S) enema 1 Enema  1 Enema Rectal DAILY PRN    ondansetron (ZOFRAN) injection 4 mg  4 mg IntraVENous Q6H PRN    chlorhexidine (PERIDEX) 0.12 % mouthwash 15 mL  15 mL Oral Q12H         Data Review:   Labs:    Recent Results (from the past 24 hour(s))   METABOLIC PANEL, COMPREHENSIVE    Collection Time: 10/08/22  1:35 AM   Result Value Ref Range    Sodium 135 (L) 136 - 145 mmol/L    Potassium 4.2 3.5 - 5.1 mmol/L    Chloride 101 97 - 108 mmol/L    CO2 25 21 - 32 mmol/L    Anion gap 9 5 - 15 mmol/L    Glucose 110 (H) 65 - 100 mg/dL    BUN 17 6 - 20 MG/DL    Creatinine 1.16 (H) 0.55 - 1.02 MG/DL    BUN/Creatinine ratio 15 12 - 20      eGFR 48 (L) >60 ml/min/1.73m2    Calcium 9.1 8.5 - 10.1 MG/DL    Bilirubin, total 1.1 (H) 0.2 - 1.0 MG/DL    ALT (SGPT) 27 12 - 78 U/L    AST (SGOT) 20 15 - 37 U/L    Alk.  phosphatase 86 45 - 117 U/L    Protein, total 5.7 (L) 6.4 - 8.2 g/dL    Albumin 3.3 (L) 3.5 - 5.0 g/dL    Globulin 2.4 2.0 - 4.0 g/dL    A-G Ratio 1.4 1.1 - 2.2     MAGNESIUM    Collection Time: 10/08/22  1:35 AM   Result Value Ref Range    Magnesium 1.4 (L) 1.6 - 2.4 mg/dL   PHOSPHORUS    Collection Time: 10/08/22  1:35 AM   Result Value Ref Range    Phosphorus 2.1 (L) 2.6 - 4.7 MG/DL   CBC WITH AUTOMATED DIFF    Collection Time: 10/08/22  1:35 AM   Result Value Ref Range    WBC 6.3 3.6 - 11.0 K/uL    RBC 4.25 3.80 - 5.20 M/uL    HGB 10.6 (L) 11.5 - 16.0 g/dL    HCT 32.2 (L) 35.0 - 47.0 %    MCV 75.8 (L) 80.0 - 99.0 FL    MCH 24.9 (L) 26.0 - 34.0 PG    MCHC 32.9 30.0 - 36.5 g/dL    RDW 12.5 11.5 - 14.5 %    PLATELET 250 (L) 925 - 400 K/uL    MPV 12.5 8.9 - 12.9 FL    NRBC 0.6 (H) 0  WBC    ABSOLUTE NRBC 0.04 (H) 0.00 - 0.01 K/uL    NEUTROPHILS 64 32 - 75 %    LYMPHOCYTES 15 12 - 49 %    MONOCYTES 19 (H) 5 - 13 %    EOSINOPHILS 1 0 - 7 %    BASOPHILS 0 0 - 1 %    IMMATURE GRANULOCYTES 1 (H) 0.0 - 0.5 %    ABS. NEUTROPHILS 4.0 1.8 - 8.0 K/UL    ABS. LYMPHOCYTES 1.0 0.8 - 3.5 K/UL    ABS. MONOCYTES 1.2 (H) 0.0 - 1.0 K/UL    ABS. EOSINOPHILS 0.1 0.0 - 0.4 K/UL    ABS. BASOPHILS 0.0 0.0 - 0.1 K/UL    ABS. IMM.  GRANS. 0.1 (H) 0.00 - 0.04 K/UL    DF AUTOMATED     PROTHROMBIN TIME + INR    Collection Time: 10/08/22  1:35 AM   Result Value Ref Range    INR 1.2 (H) 0.9 - 1.1      Prothrombin time 12.8 (H) 9.0 - 11.1 sec   LACTIC ACID    Collection Time: 10/08/22  2:15 AM   Result Value Ref Range    Lactic acid 1.4 0.4 - 2.0 MMOL/L

## 2022-10-08 NOTE — PROGRESS NOTES
Bedside and Verbal shift change report given to Moses (oncoming nurse) by Flor Woodson (offgoing nurse). Report included the following information SBAR, Kardex, Intake/Output, MAR, Recent Results, Med Rec Status, Cardiac Rhythm Sinus Rhythm, and Alarm Parameters.     1100 Up in chair with chair alarm    1930 report to [facility], patient has tele orders

## 2022-10-08 NOTE — PROGRESS NOTES
Pt continues to have intermittent periods of confusion consistent with her baseline Alzheimer's. Pt family to home at approximately 2145. Pt resting and sleeping intermittently. During midnight reassessment upon examination pt, noted pt had pulled left PIV out and placed it on the bedside table. No trauma or bleeding noted. Inquired with patient as to why she pulled out her IV. Pt did not realize she pulled out her IV. Pt also started pulling off leads. Attempted to redirect pt. Pt also asking repeatedly why she is in the hospital, was she in an accident. Pt also worried about her family members. Tearful. Multiple attempts to reassure. At 0130 in to restart IV and draw labs. Pt soon after medicated with Dilaudid 0.5 mg IV as she had complaints of significant left side pain. Pt bathed, leads and linens changed. Reminded pt of safety measures and encouraged rest. Will continue to monitor.

## 2022-10-08 NOTE — PROGRESS NOTES
Bedside and Verbal shift change report given to 15 Sisi Martinez RN (oncoming nurse) by Aaron Guardado (offgoing nurse). Report included the following information SBAR, Kardex, and Intake/Output.

## 2022-10-08 NOTE — PROGRESS NOTES
[address] [facility] Adult  Hospitalist Group                 Hospitalist Progress Note  Ezequiel Atkins MD  Answering service: [phone] OR 36 from in house phone        Date of Service:  10/8/2022  NAME:  Pj Dias  :    MRN:  [id number]      Admission Summary:   77F with Alzheimer disease, HTN, presenting after witnessed cardiac arrest at home.  immediately started CPR, EMS then found Vifb with Shock x1, followed by asystole. ROSC < 5minutes reported. EMS transported to [facility] , [place] with pulse for 30 seconds started on amio gtt. CTH negative. Moved to [facility] CCU for further management. Patient is post heart cath and AICD placement and off inotropes and transferred to the hospitalist team in the early AM hours of 10/7/22 as an ICU downgrade       Interval history / Subjective:   Patient seen and examined, denies chest pain or shortness of breath  She has no complaints for me and only issue reported by nursing was left sided pain and low mag on labs management of cardiac issues by cardiology and advanced heart failure team     Assessment & Plan:     V Fib Cardiac arrest prior to admission with CHF based on workup this admission  CHF- new diagnosis of dilated cardiomyopathy, LVEF 10-15%, stage C, NYHA class IIIA    off dobutamine - hypotensive post cath and BP now low stable on midodrine Q8  Heart cath 10/6/22 with following results= Mild one vessel CAD, Rt dominant   Severe LV systolic dysfunction, EF 15 to 20%;  Elevated LVEDP; Mild pulmonary HTN  10/6/22 - ICD implant for Cardiomyopathy LVEF =10 %  & Secondary prevention post cardiac arrest for V fib  Currently on diuretics and not on rate control meds  Medical management per cardiology and AHF team     Pulm- stable on RA     GI- Diet as tolerated     Renal- creatinine mildly elevated post procedures and heart cath and after starting diuretics    Anemia/ thrombocytopenia-   Impressive drop in plt count attributed to heparin drip d/madiha 10/4, plt count improving, HIT negative  - anemia NOS- monitor Hb- low stable today- no evidence of any GI bleeding      UA positive on admission, completed 3 day course of IV ceftriaxone  Hx of dementia- no evidence of ICU delerium    Low mag- replete IV  Left sided pain- IV dilaudid given- suspected costochrondritis from CPR     OT/PT eval and CM consult for DC planning- looks like home with home health when cleared by cardiology and heart failure teams    Code status: Full  Prophylaxis: SCDs  Care Plan discussed with: intensivist  Anticipated Disposition: 24-48hrs     Hospital Problems  Never Reviewed            Codes Class Noted POA    Acute systolic heart failure ([facility] [address] ICD-10-CM: I50.21  ICD-9-CM: 428.21  10/4/2022 Unknown        Cardiac arrest [facility]) ICD-10-CM: I46.9  ICD-9-CM: 427.5  10/1/2022 Unknown           Review of Systems:   A comprehensive review of systems was negative except for that written in the HPI. Vital Signs:    Last 24hrs VS reviewed since prior progress note.  Most recent are:  Visit Vitals  /74 (BP 1 Location: Right upper arm, BP Patient Position: At rest)   Pulse 68   Temp 97.5 °F (36.4 °C)   Resp 10   Ht 5' 2.99\" (1.6 m)   Wt 49.5 kg (109 lb 2 oz)   SpO2 96%   BMI 19.34 kg/m²     Patient Vitals for the past 24 hrs:   Temp Pulse Resp BP SpO2   10/08/22 0800 97.5 °F (36.4 °C) 68 10 124/74 96 %   10/08/22 0700 -- 70 12 112/71 99 %   10/08/22 0600 -- 81 19 120/75 98 %   10/08/22 0500 -- 80 15 118/72 93 %   10/08/22 0400 97.9 °F (36.6 °C) 76 13 105/69 99 %   10/08/22 0300 -- 70 11 -- --   10/08/22 0200 -- 86 -- -- --   10/08/22 0100 -- 87 15 120/72 --   10/08/22 0000 99.4 °F (37.4 °C) 84 24 124/77 --   10/07/22 2300 -- 90 21 119/80 --   10/07/22 2200 -- 87 17 103/76 --   10/07/22 2140 -- 86 -- 101/60 --   10/07/22 2100 -- 80 18 101/60 --   10/07/22 2000 98.8 °F (37.1 °C) 81 18 113/66 --   10/07/22 1900 -- 85 20 128/76 --   10/07/22 1800 -- 80 14 115/75 --   10/07/22 1700 -- 79 14 113/70 --   10/07/22 1600 98.4 °F (36.9 °C) 85 26 (!) 116/97 --   10/07/22 1500 -- 77 13 120/82 --   10/07/22 1400 -- 88 14 111/62 --   10/07/22 1300 -- 81 15 117/75 --   10/07/22 1200 98.6 °F (37 °C) 84 14 113/74 --   10/07/22 1100 -- 83 15 114/71 --   10/07/22 1000 -- 82 20 118/72 --   10/07/22 0900 -- 87 15 128/86 --          Intake/Output Summary (Last 24 hours) at 10/8/2022 0835  Last data filed at 10/8/2022 0700  Gross per 24 hour   Intake 1050 ml   Output 1601 ml   Net -551 ml          Physical Examination:     I had a face to face encounter with this patient and independently examined them on 10/8/2022 as outlined below:          Constitutional:  No acute distress, cooperative, pleasant    HEENT:  EOMI, Oral mucosa moist, oropharynx benign. Resp:  CTA bilaterally. No wheezing/rhonchi/rales. No accessory muscle use. CV:  Regular rhythm, normal rate, no murmurs, gallops, rubs, dressing on left upper chest from AICD placement- CDI without swelling    GI:  Soft, non distended, non tender. normoactive bowel sounds,    Musculoskeletal:  No edema, warm, 2+ pulses throughout    Neurologic:  Moves all extremities. AAOx3, CN II-XII reviewed  Psych: flat affect, normal mood            Data Review:    Review and/or order of clinical lab test  Review and/or order of tests in the radiology section of CPT  Review and/or order of tests in the medicine section of CPT  CXR Results                   10/06/22 2050  XR CHEST PORT Final result    Impression:  1. Cardiac assist device in the left chest has a single lead which projects to   terminate in the right ventricle. Narrative:  INDICATION: On return to floor. . Evaluate for pneumothorax   Additional history:   COMPARISON: Previous chest xray, 10/4/2022 and 10/2/2022. LIMITATIONS: Portable technique. Cesar Bain    FINDINGS: Single frontal view of the chest.    .   Lines/tubes/surgical: The cardiac assist device in the left chest has a single   lead which projects to terminate in the right ventricle. Interval removal of a   right IJ approach catheter. Heart/mediastinum: Unremarkable. Lungs/pleura:  No focal consolidation or mass. No visualized pleural effusion or   pneumothorax. Additional Comments: None. .              Results       Procedure Component Value Units Date/Time    CULTURE, BLOOD #1 [[id number]] Collected: 10/02/22 0653    Order Status: Completed Specimen: Blood Updated: 10/07/22 1622     Special Requests: NO SPECIAL REQUESTS        Culture result: NO GROWTH 5 DAYS       CULTURE, BLOOD #2 [[id number]] Collected: 10/02/22 0653    Order Status: Completed Specimen: Blood Updated: 10/07/22 1622     Special Requests: NO SPECIAL REQUESTS        Culture result: NO GROWTH 5 DAYS       URINE CULTURE HOLD SAMPLE [[id number]] Collected: 10/01/22 2156    Order Status: Completed Specimen: Urine from Serum Updated: 10/01/22 2203     Urine culture hold       Urine on hold in Microbiology dept for 2 days. If unpreserved urine is submitted, it cannot be used for addtional testing after 24 hours, recollection will be required. Labs:     Recent Labs     10/08/22  0135 10/07/22  0525   WBC 6.3 6.8   HGB 10.6* 10.7*   HCT 32.2* 33.6*   * 112*       Recent Labs     10/08/22  0135 10/07/22  0525 10/06/22  0503   * 138 140   K 4.2 4.9 4.4    103 109*   CO2 25 24 24   BUN 17 16 18   CREA 1.16* 1.04* 1.00   * 100 86   CA 9.1 9.4 9.1   MG 1.4* 1.8 1.8   PHOS 2.1* 2.7 2.8       Recent Labs     10/08/22  0135 10/07/22  0525 10/06/22  0503   ALT 27 38 46   AP 86 100 93   TBILI 1.1* 1.0 1.0   TP 5.7* 6.0* 5.2*   ALB 3.3* 3.3* 3.0*   GLOB 2.4 2.7 2.2       Recent Labs     10/08/22  0135 10/07/22  0525 10/06/22  0503 10/05/22  1632   INR 1.2* 1.1 1.1  --    PTP 12.8* 11.7* 11.9*  --    APTT  --  25.1 26.4 28.8        No results for input(s): FE, TIBC, PSAT, FERR in the last 72 hours.    No results found for: FOL, RBCF   No results for input(s): PH, PCO2, PO2 in the last 72 hours. No results for input(s): CPK, CKNDX, TROIQ in the last 72 hours.     No lab exists for component: CPKMB  No results found for: CHOL, CHOLX, CHLST, CHOLV, HDL, HDLP, LDL, LDLC, DLDLP, TGLX, TRIGL, TRIGP, CHHD, CHHDX  Lab Results   Component Value Date/Time    Glucose (POC) 126 (H) 10/03/2022 02:10 PM    Glucose (POC) 143 (H) 10/03/2022 04:20 AM    Glucose (POC) 192 (H) 10/02/2022 11:56 PM    Glucose (POC) 191 (H) 10/02/2022 06:36 PM    Glucose,  (H) 10/01/2022 09:18 PM     Lab Results   Component Value Date/Time    Color YELLOW/STRAW 10/01/2022 09:56 PM    Appearance HAZY (A) 10/01/2022 09:56 PM    Specific gravity 1.020 10/01/2022 09:56 PM    pH (UA) 6.0 10/01/2022 09:56 PM    Protein 100 (A) 10/01/2022 09:56 PM    Glucose Negative 10/01/2022 09:56 PM    Ketone Negative 10/01/2022 09:56 PM    Bilirubin Negative 10/01/2022 09:56 PM    Urobilinogen 0.2 10/01/2022 09:56 PM    Nitrites Negative 10/01/2022 09:56 PM    Leukocyte Esterase Negative 10/01/2022 09:56 PM    Epithelial cells MODERATE (A) 10/01/2022 09:56 PM    Bacteria 2+ (A) 10/01/2022 09:56 PM    WBC 10-20 10/01/2022 09:56 PM    RBC 5-10 10/01/2022 09:56 PM         Medications Reviewed:     Current Facility-Administered Medications   Medication Dose Route Frequency    magnesium sulfate 2 g/50 ml IVPB (premix or compounded)  2 g IntraVENous ONCE    midodrine (PROAMATINE) tablet 10 mg  10 mg Oral Q8H    magnesium oxide (MAG-OX) tablet 400 mg  400 mg Oral DAILY    [Held by provider] bumetanide (BUMEX) tablet 1 mg  1 mg Oral DAILY    sodium chloride (NS) flush 5-40 mL  5-40 mL IntraVENous Q8H    sodium chloride (NS) flush 5-40 mL  5-40 mL IntraVENous PRN    HYDROcodone-acetaminophen (NORCO) 5-325 mg per tablet 1 Tablet  1 Tablet Oral Q4H PRN    spironolactone (ALDACTONE) tablet 12.5 mg  12.5 mg Oral DAILY    lidocaine (XYLOCAINE) 4 % cream   Topical PRN    therapeutic multivitamin (THERAGRAN) tablet 1 Tablet  1 Tablet Oral DAILY    ergocalciferol capsule 50,000 Units  50,000 Units Oral Q7D    guaiFENesin (ROBITUSSIN) 100 mg/5 mL oral liquid 100 mg  100 mg Oral Q4H PRN    lidocaine 4 % patch 1 Patch  1 Patch TransDERmal Q24H    alteplase (CATHFLO) 1 mg in sterile water (preservative free) 1 mL injection  1 mg InterCATHeter PRN    sodium chloride (NS) flush 5-40 mL  5-40 mL IntraVENous Q8H    sodium chloride (NS) flush 5-40 mL  5-40 mL IntraVENous PRN    acetaminophen (TYLENOL) tablet 650 mg  650 mg Oral Q6H PRN    Or    acetaminophen (TYLENOL) suppository 650 mg  650 mg Rectal Q6H PRN    polyethylene glycol (MIRALAX) packet 17 g  17 g Oral DAILY    senna-docusate (PERICOLACE) 8.6-50 mg per tablet 1 Tablet  1 Tablet Oral BID    magnesium hydroxide (MILK OF MAGNESIA) 400 mg/5 mL oral suspension 30 mL  30 mL Oral DAILY PRN    sodium phosphate (FLEET'S) enema 1 Enema  1 Enema Rectal DAILY PRN    ondansetron (ZOFRAN) injection 4 mg  4 mg IntraVENous Q6H PRN    chlorhexidine (PERIDEX) 0.12 % mouthwash 15 mL  15 mL Oral Q12H     ______________________________________________________________________  EXPECTED LENGTH OF STAY: 4d 2h  ACTUAL LENGTH OF STAY:          6                 Salome Maldonado MD

## 2022-10-09 LAB
ALBUMIN SERPL-MCNC: 3.4 G/DL (ref 3.5–5)
ALBUMIN/GLOB SERPL: 1.1 {RATIO} (ref 1.1–2.2)
ALP SERPL-CCNC: 97 U/L (ref 45–117)
ALT SERPL-CCNC: 28 U/L (ref 12–78)
ANION GAP SERPL CALC-SCNC: 7 MMOL/L (ref 5–15)
AST SERPL-CCNC: 24 U/L (ref 15–37)
BASOPHILS # BLD: 0 K/UL (ref 0–0.1)
BASOPHILS NFR BLD: 0 % (ref 0–1)
BILIRUB SERPL-MCNC: 0.9 MG/DL (ref 0.2–1)
BNP SERPL-MCNC: ABNORMAL PG/ML
BUN SERPL-MCNC: 12 MG/DL (ref 6–20)
BUN/CREAT SERPL: 13 (ref 12–20)
CALCIUM SERPL-MCNC: 9.3 MG/DL (ref 8.5–10.1)
CHLORIDE SERPL-SCNC: 100 MMOL/L (ref 97–108)
CO2 SERPL-SCNC: 27 MMOL/L (ref 21–32)
CREAT SERPL-MCNC: 0.95 MG/DL (ref 0.55–1.02)
CRP SERPL-MCNC: 1.19 MG/DL (ref 0–0.6)
DIFFERENTIAL METHOD BLD: ABNORMAL
EOSINOPHIL # BLD: 0.2 K/UL (ref 0–0.4)
EOSINOPHIL NFR BLD: 4 % (ref 0–7)
ERYTHROCYTE [DISTWIDTH] IN BLOOD BY AUTOMATED COUNT: 12.9 % (ref 11.5–14.5)
GLOBULIN SER CALC-MCNC: 3 G/DL (ref 2–4)
GLUCOSE SERPL-MCNC: 80 MG/DL (ref 65–100)
HCT VFR BLD AUTO: 36.5 % (ref 35–47)
HGB BLD-MCNC: 11.3 G/DL (ref 11.5–16)
IMM GRANULOCYTES # BLD AUTO: 0.1 K/UL (ref 0–0.04)
IMM GRANULOCYTES NFR BLD AUTO: 1 % (ref 0–0.5)
INR PPP: 1.1 (ref 0.9–1.1)
LYMPHOCYTES # BLD: 1.1 K/UL (ref 0.8–3.5)
LYMPHOCYTES NFR BLD: 18 % (ref 12–49)
MAGNESIUM SERPL-MCNC: 1.9 MG/DL (ref 1.6–2.4)
MCH RBC QN AUTO: 24.1 PG (ref 26–34)
MCHC RBC AUTO-ENTMCNC: 31 G/DL (ref 30–36.5)
MCV RBC AUTO: 77.8 FL (ref 80–99)
MONOCYTES # BLD: 0.9 K/UL (ref 0–1)
MONOCYTES NFR BLD: 14 % (ref 5–13)
NEUTS SEG # BLD: 3.8 K/UL (ref 1.8–8)
NEUTS SEG NFR BLD: 63 % (ref 32–75)
NRBC # BLD: 0 K/UL (ref 0–0.01)
NRBC BLD-RTO: 0 PER 100 WBC
PHOSPHATE SERPL-MCNC: 2.5 MG/DL (ref 2.6–4.7)
PLATELET # BLD AUTO: 149 K/UL (ref 150–400)
PMV BLD AUTO: 11.2 FL (ref 8.9–12.9)
POTASSIUM SERPL-SCNC: 4 MMOL/L (ref 3.5–5.1)
PROT SERPL-MCNC: 6.4 G/DL (ref 6.4–8.2)
PROTHROMBIN TIME: 11.4 SEC (ref 9–11.1)
RBC # BLD AUTO: 4.69 M/UL (ref 3.8–5.2)
SODIUM SERPL-SCNC: 134 MMOL/L (ref 136–145)
WBC # BLD AUTO: 6 K/UL (ref 3.6–11)

## 2022-10-09 PROCEDURE — 74011250637 HC RX REV CODE- 250/637: Performed by: EMERGENCY MEDICINE

## 2022-10-09 PROCEDURE — 84100 ASSAY OF PHOSPHORUS: CPT

## 2022-10-09 PROCEDURE — 83880 ASSAY OF NATRIURETIC PEPTIDE: CPT

## 2022-10-09 PROCEDURE — 36415 COLL VENOUS BLD VENIPUNCTURE: CPT

## 2022-10-09 PROCEDURE — 65270000046 HC RM TELEMETRY

## 2022-10-09 PROCEDURE — 74011250637 HC RX REV CODE- 250/637: Performed by: INTERNAL MEDICINE

## 2022-10-09 PROCEDURE — 85025 COMPLETE CBC W/AUTO DIFF WBC: CPT

## 2022-10-09 PROCEDURE — 83735 ASSAY OF MAGNESIUM: CPT

## 2022-10-09 PROCEDURE — 74011250637 HC RX REV CODE- 250/637: Performed by: STUDENT IN AN ORGANIZED HEALTH CARE EDUCATION/TRAINING PROGRAM

## 2022-10-09 PROCEDURE — 80053 COMPREHEN METABOLIC PANEL: CPT

## 2022-10-09 PROCEDURE — 86140 C-REACTIVE PROTEIN: CPT

## 2022-10-09 PROCEDURE — 74011250637 HC RX REV CODE- 250/637: Performed by: NURSE PRACTITIONER

## 2022-10-09 PROCEDURE — 74011000250 HC RX REV CODE- 250: Performed by: STUDENT IN AN ORGANIZED HEALTH CARE EDUCATION/TRAINING PROGRAM

## 2022-10-09 PROCEDURE — 85610 PROTHROMBIN TIME: CPT

## 2022-10-09 PROCEDURE — 99233 SBSQ HOSP IP/OBS HIGH 50: CPT | Performed by: NURSE PRACTITIONER

## 2022-10-09 RX ORDER — MIDODRINE HYDROCHLORIDE 5 MG/1
5 TABLET ORAL EVERY 8 HOURS
Status: DISCONTINUED | OUTPATIENT
Start: 2022-10-09 | End: 2022-10-09

## 2022-10-09 RX ORDER — MIDODRINE HYDROCHLORIDE 5 MG/1
7.5 TABLET ORAL EVERY 8 HOURS
Status: DISCONTINUED | OUTPATIENT
Start: 2022-10-09 | End: 2022-10-11

## 2022-10-09 RX ADMIN — POLYETHYLENE GLYCOL 3350 17 G: 17 POWDER, FOR SOLUTION ORAL at 09:30

## 2022-10-09 RX ADMIN — MAGNESIUM OXIDE 400 MG (241.3 MG MAGNESIUM) TABLET 400 MG: TABLET at 09:29

## 2022-10-09 RX ADMIN — HYDROCODONE BITARTRATE AND ACETAMINOPHEN 1 TABLET: 5; 325 TABLET ORAL at 16:25

## 2022-10-09 RX ADMIN — SODIUM CHLORIDE, PRESERVATIVE FREE 10 ML: 5 INJECTION INTRAVENOUS at 07:12

## 2022-10-09 RX ADMIN — HYDROCODONE BITARTRATE AND ACETAMINOPHEN 1 TABLET: 5; 325 TABLET ORAL at 09:29

## 2022-10-09 RX ADMIN — SENNOSIDES AND DOCUSATE SODIUM 1 TABLET: 50; 8.6 TABLET ORAL at 09:29

## 2022-10-09 RX ADMIN — HYDROCODONE BITARTRATE AND ACETAMINOPHEN 1 TABLET: 5; 325 TABLET ORAL at 21:54

## 2022-10-09 RX ADMIN — THERA TABS 1 TABLET: TAB at 09:29

## 2022-10-09 RX ADMIN — SODIUM CHLORIDE, PRESERVATIVE FREE 10 ML: 5 INJECTION INTRAVENOUS at 13:46

## 2022-10-09 RX ADMIN — MIDODRINE HYDROCHLORIDE 7.5 MG: 5 TABLET ORAL at 21:54

## 2022-10-09 RX ADMIN — MIDODRINE HYDROCHLORIDE 7.5 MG: 5 TABLET ORAL at 13:44

## 2022-10-09 RX ADMIN — SPIRONOLACTONE 12.5 MG: 25 TABLET ORAL at 09:29

## 2022-10-09 NOTE — PROGRESS NOTES
1930-Bedside shift change report given to Matilda Gilbert (oncoming nurse) by Katina Garcia RN (offgoing nurse). Report included the following information SBAR, Kardex, Intake/Output, MAR, Recent Results, and Cardiac Rhythm NSR .     0730-Bedside shift change report given to Sridhar Llanos RN (oncoming nurse) by Kayy Aleman RN (offgoing nurse). Report included the following information SBAR, Kardex, Intake/Output, MAR, Recent Results, and Cardiac Rhythm NSR .

## 2022-10-09 NOTE — PROGRESS NOTES
Cardiology Progress Note                                        Admit Date: 10/2/2022    Assessment/Plan:     S/p cardiac arrest; now s/p ICD  CHF; improving; RHC with elevated LVEDP and PASP  Thrombocytopenia; stable   Cardiomyopathy; severe; weaning midodrine and adding GDMT as able    Reina Cormier is a 65 y.o. female with     PROBLEM LIST:  Patient Active Problem List    Diagnosis Date Noted    Acute systolic heart failure ([facility] [address] 10/04/2022    Cardiac arrest ([facility] [address] 10/01/2022         Subjective:     Reina Cormier reports none. Visit Vitals  BP 93/60   Pulse 75   Temp 98.3 °F (36.8 °C)   Resp 17   Ht 5' 2.99\" (1.6 m)   Wt 49.2 kg (108 lb 7.5 oz)   SpO2 95%   BMI 19.22 kg/m²       Intake/Output Summary (Last 24 hours) at 10/9/2022 1125  Last data filed at 10/9/2022 0800  Gross per 24 hour   Intake 120 ml   Output 1300 ml   Net -1180 ml         Objective:      Physical Exam:  HEENT: Perrla, EOMI  Neck: No JVD,  No thyroidmegaly  Resp: CTA bilaterally;  No wheezes or rales  CV: RRR s1s2 No murmur, + s3  Abd:Soft, Nontender  Ext: No edema  Neuro: Alert and oriented; Nonfocal  Skin: Warm, Dry, Intact  Pulses: 2+ DP/PT/Rad      Telemetry: normal sinus rhythm    Current Facility-Administered Medications   Medication Dose Route Frequency    midodrine (PROAMATINE) tablet 7.5 mg  7.5 mg Oral Q8H    magnesium oxide (MAG-OX) tablet 400 mg  400 mg Oral DAILY    [Held by provider] bumetanide (BUMEX) tablet 1 mg  1 mg Oral DAILY    sodium chloride (NS) flush 5-40 mL  5-40 mL IntraVENous PRN    HYDROcodone-acetaminophen (NORCO) 5-325 mg per tablet 1 Tablet  1 Tablet Oral Q4H PRN    spironolactone (ALDACTONE) tablet 12.5 mg  12.5 mg Oral DAILY    lidocaine (XYLOCAINE) 4 % cream   Topical PRN    therapeutic multivitamin (THERAGRAN) tablet 1 Tablet  1 Tablet Oral DAILY    ergocalciferol capsule 50,000 Units  50,000 Units Oral Q7D    guaiFENesin (ROBITUSSIN) 100 mg/5 mL oral liquid 100 mg  100 mg Oral Q4H PRN    lidocaine 4 % patch 1 Patch  1 Patch TransDERmal Q24H    alteplase (CATHFLO) 1 mg in sterile water (preservative free) 1 mL injection  1 mg InterCATHeter PRN    sodium chloride (NS) flush 5-40 mL  5-40 mL IntraVENous Q8H    sodium chloride (NS) flush 5-40 mL  5-40 mL IntraVENous PRN    acetaminophen (TYLENOL) tablet 650 mg  650 mg Oral Q6H PRN    Or    acetaminophen (TYLENOL) suppository 650 mg  650 mg Rectal Q6H PRN    polyethylene glycol (MIRALAX) packet 17 g  17 g Oral DAILY    senna-docusate (PERICOLACE) 8.6-50 mg per tablet 1 Tablet  1 Tablet Oral BID    magnesium hydroxide (MILK OF MAGNESIA) 400 mg/5 mL oral suspension 30 mL  30 mL Oral DAILY PRN    sodium phosphate (FLEET'S) enema 1 Enema  1 Enema Rectal DAILY PRN    ondansetron (ZOFRAN) injection 4 mg  4 mg IntraVENous Q6H PRN         Data Review:   Labs:    Recent Results (from the past 24 hour(s))   METABOLIC PANEL, COMPREHENSIVE    Collection Time: 10/09/22  4:30 AM   Result Value Ref Range    Sodium 134 (L) 136 - 145 mmol/L    Potassium 4.0 3.5 - 5.1 mmol/L    Chloride 100 97 - 108 mmol/L    CO2 27 21 - 32 mmol/L    Anion gap 7 5 - 15 mmol/L    Glucose 80 65 - 100 mg/dL    BUN 12 6 - 20 MG/DL    Creatinine 0.95 0.55 - 1.02 MG/DL    BUN/Creatinine ratio 13 12 - 20      eGFR >60 >60 ml/min/1.73m2    Calcium 9.3 8.5 - 10.1 MG/DL    Bilirubin, total 0.9 0.2 - 1.0 MG/DL    ALT (SGPT) 28 12 - 78 U/L    AST (SGOT) 24 15 - 37 U/L    Alk.  phosphatase 97 45 - 117 U/L    Protein, total 6.4 6.4 - 8.2 g/dL    Albumin 3.4 (L) 3.5 - 5.0 g/dL    Globulin 3.0 2.0 - 4.0 g/dL    A-G Ratio 1.1 1.1 - 2.2     MAGNESIUM    Collection Time: 10/09/22  4:30 AM   Result Value Ref Range    Magnesium 1.9 1.6 - 2.4 mg/dL   PHOSPHORUS    Collection Time: 10/09/22  4:30 AM   Result Value Ref Range    Phosphorus 2.5 (L) 2.6 - 4.7 MG/DL   CBC WITH AUTOMATED DIFF    Collection Time: 10/09/22  4:30 AM   Result Value Ref Range    WBC 6.0 3.6 - 11.0 K/uL    RBC 4.69 3.80 - 5.20 M/uL    HGB 11.3 (L) 11.5 - 16.0 g/dL    HCT 36.5 35.0 - 47.0 %    MCV 77.8 (L) 80.0 - 99.0 FL    MCH 24.1 (L) 26.0 - 34.0 PG    MCHC 31.0 30.0 - 36.5 g/dL    RDW 12.9 11.5 - 14.5 %    PLATELET 226 (L) 218 - 400 K/uL    MPV 11.2 8.9 - 12.9 FL    NRBC 0.0 0  WBC    ABSOLUTE NRBC 0.00 0.00 - 0.01 K/uL    NEUTROPHILS 63 32 - 75 %    LYMPHOCYTES 18 12 - 49 %    MONOCYTES 14 (H) 5 - 13 %    EOSINOPHILS 4 0 - 7 %    BASOPHILS 0 0 - 1 %    IMMATURE GRANULOCYTES 1 (H) 0.0 - 0.5 %    ABS. NEUTROPHILS 3.8 1.8 - 8.0 K/UL    ABS. LYMPHOCYTES 1.1 0.8 - 3.5 K/UL    ABS. MONOCYTES 0.9 0.0 - 1.0 K/UL    ABS. EOSINOPHILS 0.2 0.0 - 0.4 K/UL    ABS. BASOPHILS 0.0 0.0 - 0.1 K/UL    ABS. IMM.  GRANS. 0.1 (H) 0.00 - 0.04 K/UL    DF AUTOMATED     PROTHROMBIN TIME + INR    Collection Time: 10/09/22  4:30 AM   Result Value Ref Range    INR 1.1 0.9 - 1.1      Prothrombin time 11.4 (H) 9.0 - 11.1 sec   C REACTIVE PROTEIN, QT    Collection Time: 10/09/22  4:30 AM   Result Value Ref Range    C-Reactive protein 1.19 (H) 0.00 - 0.60 mg/dL   NT-PRO BNP    Collection Time: 10/09/22  4:30 AM   Result Value Ref Range    NT pro-BNP 27,474 (H) <450 PG/ML

## 2022-10-09 NOTE — PROGRESS NOTES
[address]  Inpatient Progress Note      Patient name: Yadira Salazar  Patient : 1945  Patient MRN: [id number]  Consulting MD: Woody Leone MD  Date of service: 10/09/22    REASON FOR REFERRAL:  Management of cardiogenic shock     PLAN OF CARE:  66 y/o with h/o new diagnosis of dilated cardiomyopathy, LVEF 10-15%, stage C, NYHA class IIIA presents with VF cardiac arrest c/b acute renal and hepatic failure, extubated, AAOx4 and conversant thus unlikely significant anoxic brain injury with recovering hemodynamics and renal/hepatic function  Stable hemodynamics off Doutamine; hopefully LVEF and hemodynamics remain stable off inotropic support. poor candidate for home inotropes  LHC/RHC yesterday off inotropes with minimal CAD, PCWP 26 and CI of 2.3, will obtain non-invasive NICOM today to see if CI correlates     RECOMMENDATIONS:   midodrine to 7.5mg PO TID for now, will wean as tolerated  If BP stable after midodrine wean, will start low dose BBrx   If BP remains stable off inotropes will start lose dose Entresto   Cont spironolactone 12.5mg daily  Cannot tolerate SGLT2i inhibitor due to UTI- will consider as OP when UTI clears   Continue high dose vitamin D weekly x 12 weeks ( 2023)  Hold diuretics today  Not on allopurinol or uloric, uric acid 3.1  Discontinued heparin due to thrombocytopenia; PVD prophylaxis per primary team  Not on aspirin  Not on statins  Repeat TTE before discharge   Will need IP evaluation for JOHANA  S/o AICD for SCD prevention per Dr. Yovany Alberts HF screening labs: hepatitis profile  Gammopathy FLC ratio is slightly elevated, no M Flynn  Consider genetic testing Monday if consented   PYP ordered   Nutritionist consult and heart failure education when patient recovers   Recommend flu, covid and pneumonia vaccinations at discharge     Patient assessed.  High suspicion of sleep apnea due to the following reasons. Will refer for sleep evaluation. [x] Heart Failure  [] Hypertention  [] Atrial Fibrillation  [] BMI > 30  [] History of stroke  [] Diabetes  [] Heavy snoring  [] Witnessed apnea  [] Hypoxemia      Remainder of care per primary team     IMPRESSION:  Sinus bradycardia, resolved  Acute on chronic HFrEF heart failure  Stage C, NYHA class IV symptoms  Dilated cardiomyopathy, LVEF 10-15%  C/b VF cardiac arrest  C/b cardiogenic shock  C/b renal and hepatic failure  Cardiac risk factors:  HTN  Leukocytosis, improved  Likely UTI (culture not obtained)  Neurocognitive dysfunction  Alzheimer disease  Cannot drive  Memory loss  Vitamin D deficiency     LIFE GOALS:  Lifestyle goals reviewed with the patient. Patient's personal goals include: TBD  Important upcoming milestones or family events: none at this time  The patient identifies the following as important for living well: TBD     INTERVAL HISTORY:  Afebrile  SBP 90-110s, HR 70-80s SR  Weight 108lbs  I/O net negative 1L  CBC stable   Plt 149  K 4.2  Mg 1.4  Cr 1.16  TBili 1.1  Weaning midodrine      HISTORY OF PRESENT ILLNESS:  Sid Stark is a 65 y.o. female with newly diagnosed few weeks ago severe cardiomyopathy LVEF 25-30% was awaiting CT angiogram had cardiac arrest and collapsed at dinner table. CPR was initiated by her  right away and then paramedic within 7 minutes. She was transferred to [facility] [place] initiated on code ice protocol. She was started on diuretics due to significant dyspnea, family described NYHA class IIIB symptoms, which she took for 7 days. CARDIAC IMAGING:  Echo (10/2/22)    Left Ventricle: Severely reduced left ventricular systolic function with a visually estimated EF of 10 -15%. Left ventricle is severely dilated. Normal wall thickness. Severe global hypokinesis present. Right Ventricle: Moderately reduced systolic function. Left Atrium: Left atrium is mildly dilated. Right Atrium: Right atrium is dilated. Technical qualifiers: Echo study was technically difficult. EKG (10/3/22) NSR, septal infarct  LHC 10/6/22 mild 1V CAD     HEMODYNAMICS:  RHC 10/6/22 PAP 53/24/35, RA 9, PCWP 26, CI 2.3   CPEST not done  6MW not done    PHYSICAL EXAM:  Visit Vitals  /65   Pulse 74   Temp 98.1 °F (36.7 °C)   Resp 16   Ht 5' 2.99\" (1.6 m)   Wt 108 lb 7.5 oz (49.2 kg)   SpO2 93%   BMI 19.22 kg/m²     Physical Exam  Vitals and nursing note reviewed. Constitutional:       Appearance: Normal appearance. She is not ill-appearing. Cardiovascular:      Rate and Rhythm: Normal rate and regular rhythm. Pulses: Normal pulses. Heart sounds: Normal heart sounds. Pulmonary:      Effort: Pulmonary effort is normal.      Breath sounds: Normal breath sounds. No rhonchi. Abdominal:      General: There is no distension. Palpations: Abdomen is soft. Musculoskeletal:         General: No swelling. Skin:     General: Skin is warm and dry. Neurological:      General: No focal deficit present. Mental Status: She is alert and oriented to person, place, and time. Psychiatric:         Mood and Affect: Mood normal.        REVIEW OF SYSTEMS:  Review of Systems   Constitutional:  Negative for chills, fever and malaise/fatigue. Respiratory:  Negative for wheezing. Cardiovascular:  Negative for chest pain. Gastrointestinal:  Negative for heartburn and nausea. Musculoskeletal:  Negative for falls. R breast tenderness    Neurological:  Negative for dizziness and headaches. Psychiatric/Behavioral:  Negative for depression.        PAST MEDICAL HISTORY:  Past Medical History:   Diagnosis Date    Arthritis     Chronic pain     \"my right side has been hurting for 3-4 months\"    Hypertension        PAST SURGICAL HISTORY:  Past Surgical History:   Procedure Laterality Date    HX BREAST BIOPSY Bilateral     neg    HX BREAST REDUCTION Bilateral 2000    HX GI  2004    colon resection after perforation during ovarian cyst removal    HX HEENT      skin cyst removed from neck    HX HEENT      uvuloplasty    HX HYSTERECTOMY  1982    and bladder repair    HX SHOULDER ARTHROSCOPY      right    HX UROLOGICAL      bladder repair during hysterectomy       FAMILY HISTORY:  No family history on file. SOCIAL HISTORY:  Social History     Socioeconomic History    Marital status:    Tobacco Use    Smoking status: Former     Packs/day: 0.25     Types: Cigarettes     Quit date:      Years since quittin.8    Smokeless tobacco: Never   Substance and Sexual Activity    Alcohol use: No    Drug use: No       LABORATORY RESULTS:     Labs Latest Ref Rng & Units 10/9/2022 10/8/2022 10/7/2022 10/6/2022 10/5/2022 10/5/2022 10/4/2022   WBC 3.6 - 11.0 K/uL 6.0 6.3 6.8 5.2 - 5.6 7.1   RBC 3.80 - 5.20 M/uL 4.69 4.25 4.38 4.12 - 3.50(L) 3.61(L)   Hemoglobin 11.5 - 16.0 g/dL 11. 3(L) 10. 6(L) 10. 7(L) 10. 2(L) - 8. 6(L) 9.0(L)   Hematocrit 35.0 - 47.0 % 36.5 32. 2(L) 33. 6(L) 32. 0(L) - 26. 7(L) 28. 4(L)   MCV 80.0 - 99.0 FL 77. 8(L) 75. 8(L) 76. 7(L) 77. 7(L) - 76. 3(L) 78. 7(L)   Platelets 094 - 523 K/uL 149(L) 147(L) 112(L) 89(L) - 55(L) 39(LL)   Lymphocytes 12 - 49 % 18 15 10(L) 17 - 12 9(L)   Monocytes 5 - 13 % 14(H) 19(H) 19(H) 16(H) - 11 10   Eosinophils 0 - 7 % 4 1 1 4 - 3 2   Basophils 0 - 1 % 0 0 0 0 - 0 0   Bands 0 - 6 % - - - - - - -   Albumin 3.5 - 5.0 g/dL - 3. 3(L) 3. 3(L) 3.0(L) 3. 1(L) 2. 8(L) 2. 8(L)   Calcium 8.5 - 10.1 MG/DL - 9.1 9.4 9.1 - 8.7 8.4(L)   Glucose 65 - 100 mg/dL - 110(H) 100 86 - 91 92   BUN 6 - 20 MG/DL - 17 16 18 - 22(H) 26(H)   Creatinine 0.55 - 1.02 MG/DL - 1.16(H) 1.04(H) 1.00 - 0.89 0.98   Sodium 136 - 145 mmol/L - 135(L) 138 140 - 131(L) 133(L)   Potassium 3.5 - 5.1 mmol/L - 4.2 4.9 4.4 - 4.4 3.9   TSH 0.36 - 3.74 uIU/mL - - - - - - 0.41   Some recent data might be hidden     Lab Results   Component Value Date/Time    TSH 0.41 10/04/2022 06:18 AM       ALLERGY:  Allergies   Allergen Reactions    Iron Other (comments) IV IRON only gave her convulsions        CURRENT MEDICATIONS:    Current Facility-Administered Medications:     midodrine (PROAMATINE) tablet 10 mg, 10 mg, Oral, Q8H, Lyn, Norma B, NP, 10 mg at 10/08/22 1332    magnesium oxide (MAG-OX) tablet 400 mg, 400 mg, Oral, DAILY, Lyn, Norma B, NP, 400 mg at 10/08/22 0902    [Held by provider] bumetanide (BUMEX) tablet 1 mg, 1 mg, Oral, DAILY, Lyn, Norma B, NP    sodium chloride (NS) flush 5-40 mL, 5-40 mL, IntraVENous, PRN, Pauline Felix MD    HYDROcodone-acetaminophen (NORCO) 5-325 mg per tablet 1 Tablet, 1 Tablet, Oral, Q4H PRN, Janelle Holguin MD, 1 Tablet at 10/08/22 2301    spironolactone (ALDACTONE) tablet 12.5 mg, 12.5 mg, Oral, DAILY, Hank Reddy MD, 12.5 mg at 10/08/22 0848    lidocaine (XYLOCAINE) 4 % cream, , Topical, PRN, Kathryn HATCH MD, Given at 10/07/22 1121    therapeutic multivitamin (THERAGRAN) tablet 1 Tablet, 1 Tablet, Oral, DAILY, Kathryn HATCH MD, 1 Tablet at 10/08/22 0848    ergocalciferol capsule 50,000 Units, 50,000 Units, Oral, Q7D, Hank Reddy MD, 50,000 Units at 10/04/22 0915    guaiFENesin (ROBITUSSIN) 100 mg/5 mL oral liquid 100 mg, 100 mg, Oral, Q4H PRN, Kathryn HATCH MD, 100 mg at 10/07/22 1121    lidocaine 4 % patch 1 Patch, 1 Patch, TransDERmal, Q24H, Chari Fernando MD, 1 Patch at 10/08/22 2258    alteplase (CATHFLO) 1 mg in sterile water (preservative free) 1 mL injection, 1 mg, InterCATHeter, PRN, Alfonso Shetty MD    sodium chloride (NS) flush 5-40 mL, 5-40 mL, IntraVENous, Q8H, Noe, Jarod G, DO, 10 mL at 10/08/22 2300    sodium chloride (NS) flush 5-40 mL, 5-40 mL, IntraVENous, PRN, Karishma Fuel, DO    acetaminophen (TYLENOL) tablet 650 mg, 650 mg, Oral, Q6H PRN, 650 mg at 10/07/22 2140 **OR** acetaminophen (TYLENOL) suppository 650 mg, 650 mg, Rectal, Q6H PRN, Lesa Corolla G, DO    polyethylene glycol (MIRALAX) packet 17 g, 17 g, Oral, DAILY, Blayne Allison DO, 17 g at 10/08/22 5153    senna-docusate (PERICOLACE) 8.6-50 mg per tablet 1 Tablet, 1 Tablet, Oral, BID, Blayne Allison DO, 1 Tablet at 10/08/22 2301    magnesium hydroxide (MILK OF MAGNESIA) 400 mg/5 mL oral suspension 30 mL, 30 mL, Oral, DAILY PRN, Blayne Allison DO    sodium phosphate (FLEET'S) enema 1 Enema, 1 Enema, Rectal, DAILY PRN, Blayne Allison DO    [facility] injection 4 mg, 4 mg, IntraVENous, Q6H PRN, Blayne Allison DO, 4 mg at 10/07/22 1121    PATIENT CARE TEAM:  Patient Care Team:  Safia Michelle MD as PCP - General (Family Medicine)  Safia Michelle MD as PCP - [facility]     Thank you for allowing me to participate in this patient's care.     Leland Vazquez NP   [facility]  [address], [address]  Phone: [phone]    Total Patient Care Time: 40 minutes

## 2022-10-09 NOTE — PROGRESS NOTES
Bedside and Verbal shift change report given to Rosa Brooks RN (oncoming nurse) by Adelia Ni RN (offgoing nurse). Report included the following information SBAR, Kardex, and Intake/Output. Pt continues to have period of sundowning and pulling off medically related items. Pt pulled out PIV again during the night as well as leads multiple times. Pt requiring redirection and orientation to situation. Bed alarm in place when staff is not in room. Rounded on pt throughout the night every 30-60 minutes.

## 2022-10-09 NOTE — PROGRESS NOTES
0730 Bedside and Verbal shift change report given to Nika Richards RN (oncoming nurse) by Adelia Ni RN (offgoing nurse). Report included the following information SBAR, Kardex, ED Summary, OR Summary, Procedure Summary, Intake/Output, MAR, Accordion, Recent Results, Med Rec Status, Cardiac Rhythm Sinus rhythm, and Alarm Parameters . 1930 Bedside and Verbal shift change report given to Valerie Coles RN (oncoming nurse) by Nika Richards RN (offgoing nurse). Report included the following information SBAR, Kardex, ED Summary, OR Summary, Procedure Summary, Intake/Output, MAR, Accordion, Recent Results, Med Rec Status, Cardiac Rhythm Sinus rhythm, and Alarm Parameters .

## 2022-10-09 NOTE — PROGRESS NOTES
[address] [facility] Adult  Hospitalist Group                 Hospitalist Progress Note  Desirae Martins MD  Answering service: [phone] OR [phone] from in house phone        Date of Service:  10/9/2022  NAME:  Vince Gibbs  :  [birth year]  MRN:  [id number]      Admission Summary:   77F with Alzheimer disease, HTN, presenting after witnessed cardiac arrest at home.  immediately started CPR, EMS then found Vifb with Shock x1, followed by asystole. ROSC < 5minutes reported. EMS transported to [facility] , [place] with pulse for 30 seconds started on amio gtt. CTH negative. Moved to [facility] CCU for further management. Patient is post heart cath and AICD placement and off inotropes and transferred to the hospitalist team in the early AM hours of 10/7/22 as an ICU downgrade       Interval history / Subjective:   Patient seen and examined, she reports some pain in her ribcage - no shortness of breath  She was moved from ICU 18 to 20  management of cardiac issues by cardiology and advanced heart failure team     Assessment & Plan:     V Fib Cardiac arrest prior to admission with CHF based on workup this admission  CHF- new diagnosis of dilated cardiomyopathy, LVEF 10-15%, stage C, NYHA class IIIA    off dobutamine - hypotensive post cath and BP now low stable on midodrine Q8  Heart cath 10/6/22 with following results= Mild one vessel CAD, Rt dominant   Severe LV systolic dysfunction, EF 15 to 20%;  Elevated LVEDP; Mild pulmonary HTN  10/6/22 - ICD implant for Cardiomyopathy LVEF =10 %  & Secondary prevention post cardiac arrest for V fib  Currently on diuretics (PO sipronolactone) bumex on hold-  and not on rate control meds  Medical management per cardiology and AHF team     Pulm- stable on RA     GI- Diet as tolerated     Renal- creatinine mildly elevated post procedures and heart cath and after starting diuretics but normal today with bumex on hold    Anemia/ thrombocytopenia- Hb improved, platelets now low stable  Impressive drop in plt count attributed to heparin drip d/madiha 10/4, plt count improving, HIT negative  - anemia NOS- monitor Hb-  no evidence of any GI bleeding      UA positive on admission, completed 3 day course of IV ceftriaxone  Hx of dementia- no evidence of ICU delerium    Low mag- repleted IV  Left sided pain- IV dilaudid given- suspected costochrondritis from CPR     OT/PT eval and CM consult for DC planning- looks like home with home health when cleared by cardiology and heart failure teams    Code status: Full  Prophylaxis: SCDs  Care Plan discussed with: intensivist  Anticipated Disposition: 24-48hrs     Hospital Problems  Never Reviewed            Codes Class Noted POA    Acute systolic heart failure ([facility] [address] ICD-10-CM: I50.21  ICD-9-CM: 428.21  10/4/2022 Unknown        Cardiac arrest [facility]) ICD-10-CM: I46.9  ICD-9-CM: 427.5  10/1/2022 Unknown         Review of Systems:   A comprehensive review of systems was negative except for that written in the HPI. Vital Signs:    Last 24hrs VS reviewed since prior progress note.  Most recent are:  Visit Vitals  /82   Pulse 80   Temp 98.1 °F (36.7 °C)   Resp 18   Ht 5' 2.99\" (1.6 m)   Wt 49.2 kg (108 lb 7.5 oz)   SpO2 93%   BMI 19.22 kg/m²     Patient Vitals for the past 24 hrs:   Temp Pulse Resp BP SpO2   10/09/22 0800 -- 80 18 -- --   10/09/22 0700 -- 76 17 -- --   10/09/22 0600 -- 76 17 114/82 --   10/09/22 0500 -- 74 16 -- --   10/09/22 0400 98.1 °F (36.7 °C) 84 20 114/65 --   10/09/22 0300 -- 79 16 -- --   10/09/22 0200 -- 76 17 (!) 110/58 --   10/09/22 0000 98.1 °F (36.7 °C) 73 21 (!) 106/58 --   10/08/22 2300 -- 86 14 -- --   10/08/22 2259 -- 86 -- (!) 112/57 --   10/08/22 2200 -- 78 19 (!) 112/57 --   10/08/22 2100 -- 70 18 -- --   10/08/22 2000 97.6 °F (36.4 °C) 75 18 (!) 99/59 93 %   10/08/22 1900 -- 77 18 (!) 96/58 --   10/08/22 1800 -- 76 14 93/62 95 %   10/08/22 1700 -- 72 16 112/64 97 %   10/08/22 1600 (!) 96.7 °F (35.9 °C) 76 16 114/67 98 %   10/08/22 1500 -- 75 17 -- 98 %   10/08/22 1400 -- 72 17 92/60 98 %   10/08/22 1300 -- 78 21 94/62 100 %   10/08/22 1200 97.8 °F (36.6 °C) 69 14 118/73 96 %   10/08/22 1100 -- 66 23 117/68 97 %   10/08/22 1000 -- 76 13 112/68 96 %          Intake/Output Summary (Last 24 hours) at 10/9/2022 0940  Last data filed at 10/9/2022 0700  Gross per 24 hour   Intake 120 ml   Output 1500 ml   Net -1380 ml          Physical Examination:     I had a face to face encounter with this patient and independently examined them on 10/9/2022 as outlined below:          Constitutional:  No acute distress, cooperative, pleasant    HEENT:  EOMI, Oral mucosa moist, oropharynx benign. Resp:  CTA bilaterally. No wheezing/rhonchi/rales. No accessory muscle use. CV:  Regular rhythm, normal rate, no murmurs, gallops, rubs, dressing on left upper chest from AICD placement- CDI without swelling    GI:  Soft, non distended, non tender. normoactive bowel sounds,    Musculoskeletal:  No edema, warm, 2+ pulses throughout    Neurologic:  Moves all extremities. AAOx3, CN II-XII reviewed  Psych: flat affect, normal mood            Data Review:    Review and/or order of clinical lab test  Review and/or order of tests in the radiology section of CPT  Review and/or order of tests in the medicine section of CPT  CXR Results                   10/06/22 2050  XR CHEST PORT Final result    Impression:  1. Cardiac assist device in the left chest has a single lead which projects to   terminate in the right ventricle. Narrative:  INDICATION: On return to floor. . Evaluate for pneumothorax   Additional history:   COMPARISON: Previous chest xray, 10/4/2022 and 10/2/2022. LIMITATIONS: Portable technique. [place] FINDINGS: Single frontal view of the chest.    .   Lines/tubes/surgical: The cardiac assist device in the left chest has a single   lead which projects to terminate in the right ventricle.  Interval removal of a   right IJ approach catheter. Heart/mediastinum: Unremarkable. Lungs/pleura:  No focal consolidation or mass. No visualized pleural effusion or   pneumothorax. Additional Comments: None. .              Results       Procedure Component Value Units Date/Time    CULTURE, BLOOD #1 [[id number]] Collected: 10/02/22 0653    Order Status: Completed Specimen: Blood Updated: 10/07/22 1622     Special Requests: NO SPECIAL REQUESTS        Culture result: NO GROWTH 5 DAYS       CULTURE, BLOOD #2 [[id number]] Collected: 10/02/22 0653    Order Status: Completed Specimen: Blood Updated: 10/07/22 1622     Special Requests: NO SPECIAL REQUESTS        Culture result: NO GROWTH 5 DAYS       URINE CULTURE HOLD SAMPLE [[id number]] Collected: 10/01/22 2156    Order Status: Completed Specimen: Urine from Serum Updated: 10/01/22 2203     Urine culture hold       Urine on hold in Microbiology dept for 2 days. If unpreserved urine is submitted, it cannot be used for addtional testing after 24 hours, recollection will be required. Labs:     Recent Labs     10/09/22  0430 10/08/22  0135   WBC 6.0 6.3   HGB 11.3* 10.6*   HCT 36.5 32.2*   * 147*       Recent Labs     10/09/22  0430 10/08/22  0135 10/07/22  0525   * 135* 138   K 4.0 4.2 4.9    101 103   CO2 27 25 24   BUN 12 17 16   CREA 0.95 1.16* 1.04*   GLU 80 110* 100   CA 9.3 9.1 9.4   MG 1.9 1.4* 1.8   PHOS 2.5* 2.1* 2.7       Recent Labs     10/09/22  0430 10/08/22  0135 10/07/22  0525   ALT 28 27 38   AP 97 86 100   TBILI 0.9 1.1* 1.0   TP 6.4 5.7* 6.0*   ALB 3.4* 3.3* 3.3*   GLOB 3.0 2.4 2.7       Recent Labs     10/09/22  0430 10/08/22  0135 10/07/22  0525   INR 1.1 1.2* 1.1   PTP 11.4* 12.8* 11.7*   APTT  --   --  25.1        No results for input(s): FE, TIBC, PSAT, FERR in the last 72 hours. No results found for: FOL, RBCF   No results for input(s): PH, PCO2, PO2 in the last 72 hours.   No results for input(s): CPK, CKNDX, TROIQ in the last 72 hours.     No lab exists for component: CPKMB  No results found for: CHOL, CHOLX, CHLST, CHOLV, HDL, HDLP, LDL, LDLC, DLDLP, TGLX, TRIGL, TRIGP, CHHD, CHHDX  Lab Results   Component Value Date/Time    Glucose (POC) 126 (H) 10/03/2022 02:10 PM    Glucose (POC) 143 (H) 10/03/2022 04:20 AM    Glucose (POC) 192 (H) 10/02/2022 11:56 PM    Glucose (POC) 191 (H) 10/02/2022 06:36 PM    Glucose,  (H) 10/01/2022 09:18 PM     Lab Results   Component Value Date/Time    Color YELLOW/STRAW 10/01/2022 09:56 PM    Appearance HAZY (A) 10/01/2022 09:56 PM    Specific gravity 1.020 10/01/2022 09:56 PM    pH (UA) 6.0 10/01/2022 09:56 PM    Protein 100 (A) 10/01/2022 09:56 PM    Glucose Negative 10/01/2022 09:56 PM    Ketone Negative 10/01/2022 09:56 PM    Bilirubin Negative 10/01/2022 09:56 PM    Urobilinogen 0.2 10/01/2022 09:56 PM    Nitrites Negative 10/01/2022 09:56 PM    Leukocyte Esterase Negative 10/01/2022 09:56 PM    Epithelial cells MODERATE (A) 10/01/2022 09:56 PM    Bacteria 2+ (A) 10/01/2022 09:56 PM    WBC 10-20 10/01/2022 09:56 PM    RBC 5-10 10/01/2022 09:56 PM         Medications Reviewed:     Current Facility-Administered Medications   Medication Dose Route Frequency    midodrine (PROAMATINE) tablet 7.5 mg  7.5 mg Oral Q8H    magnesium oxide (MAG-OX) tablet 400 mg  400 mg Oral DAILY    [Held by provider] bumetanide (BUMEX) tablet 1 mg  1 mg Oral DAILY    sodium chloride (NS) flush 5-40 mL  5-40 mL IntraVENous PRN    HYDROcodone-acetaminophen (NORCO) 5-325 mg per tablet 1 Tablet  1 Tablet Oral Q4H PRN    spironolactone (ALDACTONE) tablet 12.5 mg  12.5 mg Oral DAILY    lidocaine (XYLOCAINE) 4 % cream   Topical PRN    therapeutic multivitamin (THERAGRAN) tablet 1 Tablet  1 Tablet Oral DAILY    ergocalciferol capsule 50,000 Units  50,000 Units Oral Q7D    guaiFENesin (ROBITUSSIN) 100 mg/5 mL oral liquid 100 mg  100 mg Oral Q4H PRN    lidocaine 4 % patch 1 Patch  1 Patch TransDERmal Q24H alteplase (CATHFLO) 1 mg in sterile water (preservative free) 1 mL injection  1 mg InterCATHeter PRN    sodium chloride (NS) flush 5-40 mL  5-40 mL IntraVENous Q8H    sodium chloride (NS) flush 5-40 mL  5-40 mL IntraVENous PRN    acetaminophen (TYLENOL) tablet 650 mg  650 mg Oral Q6H PRN    Or    acetaminophen (TYLENOL) suppository 650 mg  650 mg Rectal Q6H PRN    polyethylene glycol (MIRALAX) packet 17 g  17 g Oral DAILY    senna-docusate (PERICOLACE) 8.6-50 mg per tablet 1 Tablet  1 Tablet Oral BID    magnesium hydroxide (MILK OF MAGNESIA) 400 mg/5 mL oral suspension 30 mL  30 mL Oral DAILY PRN    sodium phosphate (FLEET'S) enema 1 Enema  1 Enema Rectal DAILY PRN    ondansetron (ZOFRAN) injection 4 mg  4 mg IntraVENous Q6H PRN     ______________________________________________________________________  EXPECTED LENGTH OF STAY: 4d 2h  ACTUAL LENGTH OF STAY:          7                 Leticia Jimenez MD

## 2022-10-10 ENCOUNTER — APPOINTMENT (OUTPATIENT)
Dept: NUCLEAR MEDICINE | Age: 77
DRG: 222 | End: 2022-10-10
Attending: NURSE PRACTITIONER
Payer: MEDICARE

## 2022-10-10 ENCOUNTER — APPOINTMENT (OUTPATIENT)
Dept: GENERAL RADIOLOGY | Age: 77
DRG: 222 | End: 2022-10-10
Attending: EMERGENCY MEDICINE
Payer: MEDICARE

## 2022-10-10 LAB
ALBUMIN SERPL-MCNC: 2.9 G/DL (ref 3.5–5)
ALBUMIN/GLOB SERPL: 1.1 {RATIO} (ref 1.1–2.2)
ALP SERPL-CCNC: 93 U/L (ref 45–117)
ALT SERPL-CCNC: 26 U/L (ref 12–78)
ANION GAP SERPL CALC-SCNC: 6 MMOL/L (ref 5–15)
APPEARANCE UR: CLEAR
AST SERPL-CCNC: 19 U/L (ref 15–37)
BACTERIA URNS QL MICRO: NEGATIVE /HPF
BASOPHILS # BLD: 0 K/UL (ref 0–0.1)
BASOPHILS NFR BLD: 0 % (ref 0–1)
BILIRUB SERPL-MCNC: 0.7 MG/DL (ref 0.2–1)
BILIRUB UR QL: NEGATIVE
BNP SERPL-MCNC: ABNORMAL PG/ML
BUN SERPL-MCNC: 10 MG/DL (ref 6–20)
BUN/CREAT SERPL: 13 (ref 12–20)
CALCIUM SERPL-MCNC: 9 MG/DL (ref 8.5–10.1)
CHLORIDE SERPL-SCNC: 103 MMOL/L (ref 97–108)
CO2 SERPL-SCNC: 26 MMOL/L (ref 21–32)
COLOR UR: ABNORMAL
CREAT SERPL-MCNC: 0.79 MG/DL (ref 0.55–1.02)
CRP SERPL-MCNC: 1.05 MG/DL (ref 0–0.6)
DIFFERENTIAL METHOD BLD: ABNORMAL
EOSINOPHIL # BLD: 0.2 K/UL (ref 0–0.4)
EOSINOPHIL NFR BLD: 3 % (ref 0–7)
EPITH CASTS URNS QL MICRO: ABNORMAL /LPF
ERYTHROCYTE [DISTWIDTH] IN BLOOD BY AUTOMATED COUNT: 13.2 % (ref 11.5–14.5)
GLOBULIN SER CALC-MCNC: 2.7 G/DL (ref 2–4)
GLUCOSE SERPL-MCNC: 85 MG/DL (ref 65–100)
GLUCOSE UR STRIP.AUTO-MCNC: NEGATIVE MG/DL
HAV IGM SER QL: NONREACTIVE
HBV CORE IGM SER QL: NONREACTIVE
HBV SURFACE AG SER QL: <0.1 INDEX
HBV SURFACE AG SER QL: NEGATIVE
HCT VFR BLD AUTO: 34.3 % (ref 35–47)
HCV AB SERPL QL IA: NONREACTIVE
HGB BLD-MCNC: 10.8 G/DL (ref 11.5–16)
HGB UR QL STRIP: NEGATIVE
HYALINE CASTS URNS QL MICRO: ABNORMAL /LPF (ref 0–5)
IMM GRANULOCYTES # BLD AUTO: 0 K/UL (ref 0–0.04)
IMM GRANULOCYTES NFR BLD AUTO: 1 % (ref 0–0.5)
INR PPP: 1.1 (ref 0.9–1.1)
KETONES UR QL STRIP.AUTO: NEGATIVE MG/DL
LEUKOCYTE ESTERASE UR QL STRIP.AUTO: NEGATIVE
LYMPHOCYTES # BLD: 0.9 K/UL (ref 0.8–3.5)
LYMPHOCYTES NFR BLD: 17 % (ref 12–49)
MAGNESIUM SERPL-MCNC: 1.6 MG/DL (ref 1.6–2.4)
MCH RBC QN AUTO: 24.6 PG (ref 26–34)
MCHC RBC AUTO-ENTMCNC: 31.5 G/DL (ref 30–36.5)
MCV RBC AUTO: 78.1 FL (ref 80–99)
MONOCYTES # BLD: 0.9 K/UL (ref 0–1)
MONOCYTES NFR BLD: 16 % (ref 5–13)
NEUTS SEG # BLD: 3.6 K/UL (ref 1.8–8)
NEUTS SEG NFR BLD: 63 % (ref 32–75)
NITRITE UR QL STRIP.AUTO: NEGATIVE
NRBC # BLD: 0 K/UL (ref 0–0.01)
NRBC BLD-RTO: 0 PER 100 WBC
PH UR STRIP: 7 [PH] (ref 5–8)
PHOSPHATE SERPL-MCNC: 3 MG/DL (ref 2.6–4.7)
PLATELET # BLD AUTO: 149 K/UL (ref 150–400)
PMV BLD AUTO: 10.8 FL (ref 8.9–12.9)
POTASSIUM SERPL-SCNC: 4.3 MMOL/L (ref 3.5–5.1)
PROT SERPL-MCNC: 5.6 G/DL (ref 6.4–8.2)
PROT UR STRIP-MCNC: NEGATIVE MG/DL
PROTHROMBIN TIME: 11.1 SEC (ref 9–11.1)
RBC # BLD AUTO: 4.39 M/UL (ref 3.8–5.2)
RBC #/AREA URNS HPF: ABNORMAL /HPF (ref 0–5)
SODIUM SERPL-SCNC: 135 MMOL/L (ref 136–145)
SP GR UR REFRACTOMETRY: 1.01 (ref 1–1.03)
SP1: NORMAL
SP2: NORMAL
SP3: NORMAL
STRESS TARGET HR: 142 BPM
UR CULT HOLD, URHOLD: NORMAL
UROBILINOGEN UR QL STRIP.AUTO: 0.2 EU/DL (ref 0.2–1)
WBC # BLD AUTO: 5.6 K/UL (ref 3.6–11)
WBC URNS QL MICRO: ABNORMAL /HPF (ref 0–4)

## 2022-10-10 PROCEDURE — 85610 PROTHROMBIN TIME: CPT

## 2022-10-10 PROCEDURE — 97530 THERAPEUTIC ACTIVITIES: CPT

## 2022-10-10 PROCEDURE — A9538 TC99M PYROPHOSPHATE: HCPCS

## 2022-10-10 PROCEDURE — 65270000046 HC RM TELEMETRY

## 2022-10-10 PROCEDURE — 74011250637 HC RX REV CODE- 250/637: Performed by: INTERNAL MEDICINE

## 2022-10-10 PROCEDURE — 74011000250 HC RX REV CODE- 250: Performed by: ANESTHESIOLOGY

## 2022-10-10 PROCEDURE — 74011250637 HC RX REV CODE- 250/637: Performed by: STUDENT IN AN ORGANIZED HEALTH CARE EDUCATION/TRAINING PROGRAM

## 2022-10-10 PROCEDURE — 74011000250 HC RX REV CODE- 250: Performed by: STUDENT IN AN ORGANIZED HEALTH CARE EDUCATION/TRAINING PROGRAM

## 2022-10-10 PROCEDURE — 84100 ASSAY OF PHOSPHORUS: CPT

## 2022-10-10 PROCEDURE — 99233 SBSQ HOSP IP/OBS HIGH 50: CPT | Performed by: NURSE PRACTITIONER

## 2022-10-10 PROCEDURE — 74011250637 HC RX REV CODE- 250/637: Performed by: EMERGENCY MEDICINE

## 2022-10-10 PROCEDURE — 80053 COMPREHEN METABOLIC PANEL: CPT

## 2022-10-10 PROCEDURE — 97116 GAIT TRAINING THERAPY: CPT

## 2022-10-10 PROCEDURE — 81001 URINALYSIS AUTO W/SCOPE: CPT

## 2022-10-10 PROCEDURE — 83735 ASSAY OF MAGNESIUM: CPT

## 2022-10-10 PROCEDURE — 36415 COLL VENOUS BLD VENIPUNCTURE: CPT

## 2022-10-10 PROCEDURE — 83880 ASSAY OF NATRIURETIC PEPTIDE: CPT

## 2022-10-10 PROCEDURE — 74011250636 HC RX REV CODE- 250/636: Performed by: FAMILY MEDICINE

## 2022-10-10 PROCEDURE — 85025 COMPLETE CBC W/AUTO DIFF WBC: CPT

## 2022-10-10 PROCEDURE — 86140 C-REACTIVE PROTEIN: CPT

## 2022-10-10 PROCEDURE — 74011250637 HC RX REV CODE- 250/637: Performed by: NURSE PRACTITIONER

## 2022-10-10 PROCEDURE — 80074 ACUTE HEPATITIS PANEL: CPT

## 2022-10-10 RX ORDER — MAGNESIUM SULFATE HEPTAHYDRATE 40 MG/ML
2 INJECTION, SOLUTION INTRAVENOUS ONCE
Status: COMPLETED | OUTPATIENT
Start: 2022-10-10 | End: 2022-10-10

## 2022-10-10 RX ADMIN — MIDODRINE HYDROCHLORIDE 7.5 MG: 5 TABLET ORAL at 20:30

## 2022-10-10 RX ADMIN — MAGNESIUM OXIDE 400 MG (241.3 MG MAGNESIUM) TABLET 400 MG: TABLET at 08:34

## 2022-10-10 RX ADMIN — MAGNESIUM SULFATE HEPTAHYDRATE 2 G: 40 INJECTION, SOLUTION INTRAVENOUS at 08:36

## 2022-10-10 RX ADMIN — SPIRONOLACTONE 12.5 MG: 25 TABLET ORAL at 08:34

## 2022-10-10 RX ADMIN — MIDODRINE HYDROCHLORIDE 7.5 MG: 5 TABLET ORAL at 14:30

## 2022-10-10 RX ADMIN — HYDROCODONE BITARTRATE AND ACETAMINOPHEN 1 TABLET: 5; 325 TABLET ORAL at 21:15

## 2022-10-10 RX ADMIN — SODIUM CHLORIDE, PRESERVATIVE FREE 10 ML: 5 INJECTION INTRAVENOUS at 20:32

## 2022-10-10 RX ADMIN — THERA TABS 1 TABLET: TAB at 08:34

## 2022-10-10 RX ADMIN — MIDODRINE HYDROCHLORIDE 7.5 MG: 5 TABLET ORAL at 07:12

## 2022-10-10 RX ADMIN — HYDROCODONE BITARTRATE AND ACETAMINOPHEN 1 TABLET: 5; 325 TABLET ORAL at 17:18

## 2022-10-10 RX ADMIN — ACETAMINOPHEN 650 MG: 325 TABLET, FILM COATED ORAL at 08:46

## 2022-10-10 RX ADMIN — HYDROCODONE BITARTRATE AND ACETAMINOPHEN 1 TABLET: 5; 325 TABLET ORAL at 04:32

## 2022-10-10 NOTE — PROGRESS NOTES
[facility]  Hospitalist Group                 Hospitalist Progress Note  Charles Garcia MD  Answering service: [phone] OR [phone] from in house phone        Date of Service:  10/10/2022  NAME:  Saira Smiley  :    MRN:  [id number]      Admission Summary:   77F with Alzheimer disease, HTN, presenting after witnessed cardiac arrest at home.  immediately started CPR, EMS then found Vifb with Shock x1, followed by asystole. ROSC < 5minutes reported. EMS transported to [facility] , [place] with pulse for 30 seconds started on amio gtt. CTH negative. Moved to [facility] CCU for further management. Patient is post heart cath and AICD placement and off inotropes and transferred to the hospitalist team in the early AM hours of 10/7/22 as an ICU downgrade       Interval history / Subjective:   Patient seen and examined, she denies shortness of breath and reports mild pain in rib cage likely related to prior CPR, he does not remember how she ended up in the hospital and was surprised to hear that she has been here for 8 days  cardiac issues being managed by cardiology and advanced heart failure team     Assessment & Plan:     V Fib Cardiac arrest prior to admission with CHF based on workup this admission  CHF- new diagnosis of dilated cardiomyopathy, LVEF 10-15%, stage C, NYHA class IIIA    off dobutamine - hypotensive post cath and BP now low stable on midodrine Q8  Heart cath 10/6/22 with following results= Mild one vessel CAD, Rt dominant   Severe LV systolic dysfunction, EF 15 to 20%;  Elevated LVEDP; Mild pulmonary HTN  10/6/22 - ICD implant for Cardiomyopathy LVEF =10 %  & Secondary prevention post cardiac arrest for V fib  Currently Bumex and not on rate control meds  Medical management per cardiology and AHF team     Pulm- stable on RA     GI- Diet as tolerated     Renal- creatinine was mildly elevated post procedures and heart cath   But is now normal after holding Bumex diuretics, remains on low-dose spironolactone    Anemia/ thrombocytopenia-   Impressive drop in plt count attributed to heparin drip d/madiha 10/4, plt count improving, HIT negative  - anemia NOS- monitor Hb- low stable today- no evidence of any GI bleeding      UA positive on admission, completed 3 day course of IV ceftriaxone  Hx of dementia- no evidence of ICU delerium    Low mag- will replete IV, patient has been started on oral magnesium as well  Left sided rib cage pain- IV dilaudid given- suspected costochrondritis from CPR     OT/PT eval and CM consult for DC planning- looks like home with home health when cleared by cardiology and heart failure teams    Code status: Full  Prophylaxis: SCDs  Care Plan discussed with: intensivist  Anticipated Disposition: 24-48hrs     Hospital Problems  Never Reviewed            Codes Class Noted POA    Acute systolic heart failure ([facility] [address] ICD-10-CM: I50.21  ICD-9-CM: 428.21  10/4/2022 Unknown        Cardiac arrest [facility]) ICD-10-CM: I46.9  ICD-9-CM: 427.5  10/1/2022 Unknown         Review of Systems:   A comprehensive review of systems was negative except for that written in the HPI. Vital Signs:    Last 24hrs VS reviewed since prior progress note.  Most recent are:  Visit Vitals  /71   Pulse 89   Temp 98.2 °F (36.8 °C)   Resp 16   Ht 5' 2.99\" (1.6 m)   Wt 48.3 kg (106 lb 7.7 oz)   SpO2 94%   BMI 18.87 kg/m²     Patient Vitals for the past 24 hrs:   Temp Pulse Resp BP SpO2   10/10/22 0800 -- 89 16 121/71 94 %   10/10/22 0700 -- 81 14 (!) 121/59 94 %   10/10/22 0600 -- 86 12 (!) 116/58 94 %   10/10/22 0500 -- 86 20 136/77 --   10/10/22 0400 98.2 °F (36.8 °C) 77 26 119/62 98 %   10/10/22 0300 -- 72 12 (!) 105/53 94 %   10/10/22 0200 -- 73 16 (!) 105/53 98 %   10/10/22 0100 -- 70 14 (!) 114/57 90 %   10/10/22 0000 -- 72 11 (!) 96/57 96 %   10/09/22 2300 -- 76 12 121/69 96 %   10/09/22 2200 -- 83 23 130/77 97 %   10/09/22 2100 -- 82 17 120/66 96 %   10/09/22 2000 -- 81 24 (!) 107/57 98 %   10/09/22 1900 -- 78 23 (!) 105/54 95 %   10/09/22 1800 -- 83 17 (!) 103/54 96 %   10/09/22 1700 -- 83 20 129/74 97 %   10/09/22 1600 98.3 °F (36.8 °C) 78 20 131/69 96 %   10/09/22 1500 -- 75 14 (!) 103/55 95 %   10/09/22 1400 -- -- -- (!) 104/57 --   10/09/22 1300 -- 80 21 97/66 96 %   10/09/22 1200 97.6 °F (36.4 °C) 77 19 92/74 95 %   10/09/22 1100 -- 75 17 93/60 95 %   10/09/22 1003 -- 83 18 (!) 101/59 96 %   10/09/22 1000 -- 84 17 (!) 86/53 97 %   10/09/22 0948 -- 89 18 (!) 143/63 --   10/09/22 0900 -- 98 20 -- --          Intake/Output Summary (Last 24 hours) at 10/10/2022 0830  Last data filed at 10/10/2022 0400  Gross per 24 hour   Intake 450 ml   Output 450 ml   Net 0 ml          Physical Examination:     I had a face to face encounter with this patient and independently examined them on 10/10/2022 as outlined below:          Constitutional:  No acute distress, cooperative, pleasant    HEENT:  EOMI, Oral mucosa moist, oropharynx benign. Resp:  CTA bilaterally. No wheezing/rhonchi/rales. No accessory muscle use. CV:  Regular rhythm, normal rate, no murmurs, gallops, rubs, dressing on left upper chest from AICD placement- CDI without swelling    GI:  Soft, non distended, non tender. normoactive bowel sounds,    Musculoskeletal:  No edema, warm, 2+ pulses throughout    Neurologic:  Moves all extremities. AAOx3, CN II-XII reviewed  Psych: flat affect, normal mood            Data Review:    Review and/or order of clinical lab test  Review and/or order of tests in the radiology section of CPT  Review and/or order of tests in the medicine section of CPT  CXR Results                   10/06/22 2050  XR CHEST PORT Final result    Impression:  1. Cardiac assist device in the left chest has a single lead which projects to   terminate in the right ventricle. Narrative:  INDICATION: On return to floor. . Evaluate for pneumothorax   Additional history:   COMPARISON: Previous chest xray, 10/4/2022 and 10/2/2022. LIMITATIONS: Portable technique. Yamileth Bloodgood FINDINGS: Single frontal view of the chest.    .   Lines/tubes/surgical: The cardiac assist device in the left chest has a single   lead which projects to terminate in the right ventricle. Interval removal of a   right IJ approach catheter. Heart/mediastinum: Unremarkable. Lungs/pleura:  No focal consolidation or mass. No visualized pleural effusion or   pneumothorax. Additional Comments: None. .              Results       Procedure Component Value Units Date/Time    CULTURE, BLOOD #1 [[id number]] Collected: 10/02/22 0653    Order Status: Completed Specimen: Blood Updated: 10/07/22 1622     Special Requests: NO SPECIAL REQUESTS        Culture result: NO GROWTH 5 DAYS       CULTURE, BLOOD #2 [[id number]] Collected: 10/02/22 0653    Order Status: Completed Specimen: Blood Updated: 10/07/22 1622     Special Requests: NO SPECIAL REQUESTS        Culture result: NO GROWTH 5 DAYS       URINE CULTURE HOLD SAMPLE [[id number]] Collected: 10/01/22 2156    Order Status: Completed Specimen: Urine from Serum Updated: 10/01/22 2203     Urine culture hold       Urine on hold in Microbiology dept for 2 days. If unpreserved urine is submitted, it cannot be used for addtional testing after 24 hours, recollection will be required.                    Labs:     Recent Labs     10/10/22  0502 10/09/22  0430   WBC 5.6 6.0   HGB 10.8* 11.3*   HCT 34.3* 36.5   * 149*       Recent Labs     10/10/22  0502 10/09/22  0430 10/08/22  0135   * 134* 135*   K 4.3 4.0 4.2    100 101   CO2 26 27 25   BUN 10 12 17   CREA 0.79 0.95 1.16*   GLU 85 80 110*   CA 9.0 9.3 9.1   MG 1.6 1.9 1.4*   PHOS 3.0 2.5* 2.1*       Recent Labs     10/10/22  0502 10/09/22  0430 10/08/22  0135   ALT 26 28 27   AP 93 97 86   TBILI 0.7 0.9 1.1*   TP 5.6* 6.4 5.7*   ALB 2.9* 3.4* 3.3*   GLOB 2.7 3.0 2.4       Recent Labs     10/10/22  0502 10/09/22  0439 10/08/22  0135   INR 1.1 1.1 1.2*   PTP 11.1 11.4* 12.8*        No results for input(s): FE, TIBC, PSAT, FERR in the last 72 hours. No results found for: FOL, RBCF   No results for input(s): PH, PCO2, PO2 in the last 72 hours. No results for input(s): CPK, CKNDX, TROIQ in the last 72 hours.     No lab exists for component: CPKMB  No results found for: CHOL, CHOLX, CHLST, CHOLV, HDL, HDLP, LDL, LDLC, DLDLP, TGLX, TRIGL, TRIGP, CHHD, CHHDX  Lab Results   Component Value Date/Time    Glucose (POC) 126 (H) 10/03/2022 02:10 PM    Glucose (POC) 143 (H) 10/03/2022 04:20 AM    Glucose (POC) 192 (H) 10/02/2022 11:56 PM    Glucose (POC) 191 (H) 10/02/2022 06:36 PM    Glucose,  (H) 10/01/2022 09:18 PM     Lab Results   Component Value Date/Time    Color YELLOW/STRAW 10/01/2022 09:56 PM    Appearance HAZY (A) 10/01/2022 09:56 PM    Specific gravity 1.020 10/01/2022 09:56 PM    pH (UA) 6.0 10/01/2022 09:56 PM    Protein 100 (A) 10/01/2022 09:56 PM    Glucose Negative 10/01/2022 09:56 PM    Ketone Negative 10/01/2022 09:56 PM    Bilirubin Negative 10/01/2022 09:56 PM    Urobilinogen 0.2 10/01/2022 09:56 PM    Nitrites Negative 10/01/2022 09:56 PM    Leukocyte Esterase Negative 10/01/2022 09:56 PM    Epithelial cells MODERATE (A) 10/01/2022 09:56 PM    Bacteria 2+ (A) 10/01/2022 09:56 PM    WBC 10-20 10/01/2022 09:56 PM    RBC 5-10 10/01/2022 09:56 PM         Medications Reviewed:     Current Facility-Administered Medications   Medication Dose Route Frequency    magnesium sulfate 2 g/50 ml IVPB (premix or compounded)  2 g IntraVENous ONCE    midodrine (PROAMATINE) tablet 7.5 mg  7.5 mg Oral Q8H    magnesium oxide (MAG-OX) tablet 400 mg  400 mg Oral DAILY    [Held by provider] bumetanide (BUMEX) tablet 1 mg  1 mg Oral DAILY    sodium chloride (NS) flush 5-40 mL  5-40 mL IntraVENous PRN    HYDROcodone-acetaminophen (NORCO) 5-325 mg per tablet 1 Tablet  1 Tablet Oral Q4H PRN    spironolactone (ALDACTONE) tablet 12.5 mg  12.5 mg Oral DAILY    lidocaine (XYLOCAINE) 4 % cream   Topical PRN    therapeutic multivitamin (THERAGRAN) tablet 1 Tablet  1 Tablet Oral DAILY    ergocalciferol capsule 50,000 Units  50,000 Units Oral Q7D    guaiFENesin (ROBITUSSIN) 100 mg/5 mL oral liquid 100 mg  100 mg Oral Q4H PRN    lidocaine 4 % patch 1 Patch  1 Patch TransDERmal Q24H    alteplase (CATHFLO) 1 mg in sterile water (preservative free) 1 mL injection  1 mg InterCATHeter PRN    sodium chloride (NS) flush 5-40 mL  5-40 mL IntraVENous Q8H    sodium chloride (NS) flush 5-40 mL  5-40 mL IntraVENous PRN    acetaminophen (TYLENOL) tablet 650 mg  650 mg Oral Q6H PRN    Or    acetaminophen (TYLENOL) suppository 650 mg  650 mg Rectal Q6H PRN    polyethylene glycol (MIRALAX) packet 17 g  17 g Oral DAILY    senna-docusate (PERICOLACE) 8.6-50 mg per tablet 1 Tablet  1 Tablet Oral BID    magnesium hydroxide (MILK OF MAGNESIA) 400 mg/5 mL oral suspension 30 mL  30 mL Oral DAILY PRN    sodium phosphate (FLEET'S) enema 1 Enema  1 Enema Rectal DAILY PRN    ondansetron (ZOFRAN) injection 4 mg  4 mg IntraVENous Q6H PRN     ______________________________________________________________________  EXPECTED LENGTH OF STAY: 4d 2h  ACTUAL LENGTH OF STAY:          8                 Salome Maldonado MD

## 2022-10-10 NOTE — PROGRESS NOTES
Cardiology Progress Note                                        Admit Date: 10/2/2022    Assessment/Plan:     S/p cardiac arrest; now s/p ICD  CHF; improving; RHC with elevated LVEDP and PASP  Thrombocytopenia; stable   Cardiomyopathy; severe; adding back GDMT as able given hypotension    Colleen Pleitez is a 65 y.o. female with     PROBLEM LIST:  Patient Active Problem List    Diagnosis Date Noted    Acute systolic heart failure ([facility] [address] 10/04/2022    Cardiac arrest ([facility] [address] 10/01/2022         Subjective:     Colleen Pleitez reports none. Visit Vitals  BP (!) 94/57   Pulse 75   Temp 98.2 °F (36.8 °C)   Resp 20   Ht 5' 2.99\" (1.6 m)   Wt 48.3 kg (106 lb 7.7 oz)   SpO2 94%   BMI 18.87 kg/m²       Intake/Output Summary (Last 24 hours) at 10/10/2022 1211  Last data filed at 10/10/2022 0838  Gross per 24 hour   Intake 400 ml   Output 450 ml   Net -50 ml         Objective:      Physical Exam:  HEENT: Perrla, EOMI  Neck: No JVD,  No thyroidmegaly  Resp: CTA bilaterally;  No wheezes or rales  CV: RRR s1s2 No murmur, + s3  Abd:Soft, Nontender  Ext: No edema  Neuro: Alert and oriented; Nonfocal  Skin: Warm, Dry, Intact  Pulses: 2+ DP/PT/Rad      Telemetry: normal sinus rhythm    Current Facility-Administered Medications   Medication Dose Route Frequency    midodrine (PROAMATINE) tablet 7.5 mg  7.5 mg Oral Q8H    magnesium oxide (MAG-OX) tablet 400 mg  400 mg Oral DAILY    [Held by provider] bumetanide (BUMEX) tablet 1 mg  1 mg Oral DAILY    sodium chloride (NS) flush 5-40 mL  5-40 mL IntraVENous PRN    HYDROcodone-acetaminophen (NORCO) 5-325 mg per tablet 1 Tablet  1 Tablet Oral Q4H PRN    spironolactone (ALDACTONE) tablet 12.5 mg  12.5 mg Oral DAILY    lidocaine (XYLOCAINE) 4 % cream   Topical PRN    therapeutic multivitamin (THERAGRAN) tablet 1 Tablet  1 Tablet Oral DAILY    ergocalciferol capsule 50,000 Units  50,000 Units Oral Q7D    guaiFENesin (ROBITUSSIN) 100 mg/5 mL oral liquid 100 mg  100 mg Oral Q4H PRN lidocaine 4 % patch 1 Patch  1 Patch TransDERmal Q24H    alteplase (CATHFLO) 1 mg in sterile water (preservative free) 1 mL injection  1 mg InterCATHeter PRN    sodium chloride (NS) flush 5-40 mL  5-40 mL IntraVENous Q8H    sodium chloride (NS) flush 5-40 mL  5-40 mL IntraVENous PRN    acetaminophen (TYLENOL) tablet 650 mg  650 mg Oral Q6H PRN    Or    acetaminophen (TYLENOL) suppository 650 mg  650 mg Rectal Q6H PRN    polyethylene glycol (MIRALAX) packet 17 g  17 g Oral DAILY    senna-docusate (PERICOLACE) 8.6-50 mg per tablet 1 Tablet  1 Tablet Oral BID    magnesium hydroxide (MILK OF MAGNESIA) 400 mg/5 mL oral suspension 30 mL  30 mL Oral DAILY PRN    sodium phosphate (FLEET'S) enema 1 Enema  1 Enema Rectal DAILY PRN    ondansetron (ZOFRAN) injection 4 mg  4 mg IntraVENous Q6H PRN         Data Review:   Labs:    Recent Results (from the past 24 hour(s))   METABOLIC PANEL, COMPREHENSIVE    Collection Time: 10/10/22  5:02 AM   Result Value Ref Range    Sodium 135 (L) 136 - 145 mmol/L    Potassium 4.3 3.5 - 5.1 mmol/L    Chloride 103 97 - 108 mmol/L    CO2 26 21 - 32 mmol/L    Anion gap 6 5 - 15 mmol/L    Glucose 85 65 - 100 mg/dL    BUN 10 6 - 20 MG/DL    Creatinine 0.79 0.55 - 1.02 MG/DL    BUN/Creatinine ratio 13 12 - 20      eGFR >60 >60 ml/min/1.73m2    Calcium 9.0 8.5 - 10.1 MG/DL    Bilirubin, total 0.7 0.2 - 1.0 MG/DL    ALT (SGPT) 26 12 - 78 U/L    AST (SGOT) 19 15 - 37 U/L    Alk.  phosphatase 93 45 - 117 U/L    Protein, total 5.6 (L) 6.4 - 8.2 g/dL    Albumin 2.9 (L) 3.5 - 5.0 g/dL    Globulin 2.7 2.0 - 4.0 g/dL    A-G Ratio 1.1 1.1 - 2.2     MAGNESIUM    Collection Time: 10/10/22  5:02 AM   Result Value Ref Range    Magnesium 1.6 1.6 - 2.4 mg/dL   PHOSPHORUS    Collection Time: 10/10/22  5:02 AM   Result Value Ref Range    Phosphorus 3.0 2.6 - 4.7 MG/DL   CBC WITH AUTOMATED DIFF    Collection Time: 10/10/22  5:02 AM   Result Value Ref Range    WBC 5.6 3.6 - 11.0 K/uL    RBC 4.39 3.80 - 5.20 M/uL    HGB 10.8 (L) 11.5 - 16.0 g/dL    HCT 34.3 (L) 35.0 - 47.0 %    MCV 78.1 (L) 80.0 - 99.0 FL    MCH 24.6 (L) 26.0 - 34.0 PG    MCHC 31.5 30.0 - 36.5 g/dL    RDW 13.2 11.5 - 14.5 %    PLATELET 917 (L) 630 - 400 K/uL    MPV 10.8 8.9 - 12.9 FL    NRBC 0.0 0  WBC    ABSOLUTE NRBC 0.00 0.00 - 0.01 K/uL    NEUTROPHILS 63 32 - 75 %    LYMPHOCYTES 17 12 - 49 %    MONOCYTES 16 (H) 5 - 13 %    EOSINOPHILS 3 0 - 7 %    BASOPHILS 0 0 - 1 %    IMMATURE GRANULOCYTES 1 (H) 0.0 - 0.5 %    ABS. NEUTROPHILS 3.6 1.8 - 8.0 K/UL    ABS. LYMPHOCYTES 0.9 0.8 - 3.5 K/UL    ABS. MONOCYTES 0.9 0.0 - 1.0 K/UL    ABS. EOSINOPHILS 0.2 0.0 - 0.4 K/UL    ABS. BASOPHILS 0.0 0.0 - 0.1 K/UL    ABS. IMM. GRANS. 0.0 0.00 - 0.04 K/UL    DF AUTOMATED     PROTHROMBIN TIME + INR    Collection Time: 10/10/22  5:02 AM   Result Value Ref Range    INR 1.1 0.9 - 1.1      Prothrombin time 11.1 9.0 - 11.1 sec   HEPATITIS PANEL, ACUTE    Collection Time: 10/10/22  5:02 AM   Result Value Ref Range    Hepatitis A, IgM NONREACTIVE NR      __          Hepatitis B surface Ag <0.10 Index    Hep B surface Ag Interp. Negative NEG      __          Hepatitis B core, IgM NONREACTIVE NR      __          Hep C virus Ab Interp.  NONREACTIVE NR     C REACTIVE PROTEIN, QT    Collection Time: 10/10/22  5:02 AM   Result Value Ref Range    C-Reactive protein 1.05 (H) 0.00 - 0.60 mg/dL   NT-PRO BNP    Collection Time: 10/10/22  5:02 AM   Result Value Ref Range    NT pro-BNP 13,622 (H) <450 PG/ML   NUCLEAR CARDIAC AMYLOID SPECT    Collection Time: 10/10/22  9:55 AM   Result Value Ref Range    Stress Target  bpm

## 2022-10-10 NOTE — PROGRESS NOTES
Problem: Mobility Impaired (Adult and Pediatric)  Goal: *Acute Goals and Plan of Care (Insert Text)  Description: FUNCTIONAL STATUS PRIOR TO ADMISSION: Patient was independent and active without use of DME. Ambulating community distances. Denies fall hx. Per family, pt sometimes uses chair lift to 2nd floor bedroom if \"feeling weak that day\". Pt with pmh of alzheimer's. HOME SUPPORT PRIOR TO ADMISSION: The patient lived with spouse but did not require assist.    Physical Therapy Goals  Initiated 10/4/2022. Continue goals x 7 days  1. Patient will move from supine to sit and sit to supine  in bed with modified independence within 7 day(s). 2.  Patient will transfer from bed to chair and chair to bed with modified independence using the least restrictive device within 7 day(s). 3.  Patient will perform sit to stand with modified independence within 7 day(s). 4.  Patient will ambulate with modified independence for 300 feet with the least restrictive device within 7 day(s). 5.  Patient will ascend/descend 12 stairs with 1 handrail(s) with supervision/set-up within 7 day(s). Outcome: Progressing Towards Goal       PHYSICAL THERAPY TREATMENT  Patient: Alicia Alberto (77 y.o. female)  Date: 10/10/2022  Diagnosis: Cardiac arrest ([facility] [address] [I46.9] <principal problem not specified>  Procedure(s) (LRB):  INSERT ICD SINGLE (N/A) 4 Days Post-Op  Precautions: Other (comment), Fall (PPM precautions)  Chart, physical therapy assessment, plan of care and goals were reviewed. ASSESSMENT  Patient continues with skilled PT services and is progressing towards goals. Pt agreeable to ambulate. Pt has no carryover for ICD precautions. Pt required facilitation to decrease use of UE for sit to stand. Pt was able to ambulate with the use of the rolling walker. Pt utilized rolling walker to assist with unsteady gait. Will continue to progress as able.  .     Current Level of Function Impacting Discharge (mobility/balance):min A     Other factors to consider for discharge: cognition, pt is requiring assistance for mobility, ICD precautions          PLAN :  Patient continues to benefit from skilled intervention to address the above impairments. Continue treatment per established plan of care. to address goals. Recommendation for discharge: (in order for the patient to meet his/her long term goals)  Physical therapy at least 2 days/week in the home AND ensure assist and/or supervision for safety with upright mobility, if unable then SNF    This discharge recommendation:  Has not yet been discussed the attending provider and/or case management    IF patient discharges home will need the following DME: rolling walker- per conversation pt does not have a RW. Order placed to CM       SUBJECTIVE:   Patient stated Where should I go.     OBJECTIVE DATA SUMMARY:   Critical Behavior:  Neurologic State: Alert, Confused  Orientation Level: Oriented to person, Oriented to place, Disoriented to situation, Disoriented to time  Cognition: Follows commands, Impaired decision making, Poor safety awareness, Decreased attention/concentration  Safety/Judgement: Awareness of environment, Home safety  Functional Mobility Training:  Bed Mobility:     Supine to Sit: Minimum assistance              Transfers:  Sit to Stand: Contact guard assistance facilitation to decrease left arm use   Stand to Sit: Contact guard assistance               SPT to BSC min a. Pt able to perform self care              Balance:  Sitting: Intact  Standing: Impaired  Standing - Static: Good  Standing - Dynamic : Fair  Ambulation/Gait Training:  Distance (ft): 200 Feet (ft)  Assistive Device: Gait belt;Walker, rolling           Gait Abnormalities: Decreased step clearance        Base of Support: Center of gravity altered;Narrowed        Step Length: Right shortened;Left shortened                    Stairs:               Therapeutic Exercises:     Pain Rating:  Rib pain from CPR    Activity Tolerance:   Improving     After treatment patient left in no apparent distress:   Sitting in chair, Call bell within reach, and Bed / chair alarm activated    COMMUNICATION/COLLABORATION:   The patients plan of care was discussed with: Registered nurse.      Clint Sanz PTA   Time Calculation: 23 mins

## 2022-10-10 NOTE — PROGRESS NOTES
0730 Bedside shift change report given to [address] (oncoming nurse) by Lilia Harmon RN (offgoing nurse). Report included the following information SBAR.     0830 Pt c/o chest soreness. PRN tylenol given. 1000 PT/OT at bedside. Pt ambulating in hallway, VSS.    1220 Pt off floor to nuclear medicine with monitor and transport team.    1330 Pt back on floor. Family updated at bedside. 1720 Pt states chest soreness 6/10. PRN Avalon given. 1930 Bedside shift change report given to [facility] (oncoming nurse) by Efe Hernandez RN (offgoing nurse). Report included the following information SBAR. HPI Comments: 36yo female presenting to ED with painful varicose vein in right leg. Pt states she saw PCP yesterday for pain and set up for ultrasound of leg for Monday. Pt denies swelling, erythema or warm to right leg. Also states she has felt tingling to right lower leg since this morning but denies numbness. Hx of peripheral artery disease, has seen vascular surgeon in the past.  Hx of lung blood clots, no hx of DVT. Denies fever, headaches, dizziness, CP, SOB, neck pain, back pain, abdominal pain, NVD, urinary symptoms or any other symptoms at this time. No recent travel, immobilization, illness, OCP use, smoking or any other concerning symptoms at this time. Past Medical History:  No date: Asthma  No date: Contact dermatitis and other eczema, due to un*      Comment: ezcema  No date: Depression  No date: Headache      Comment: migraines-following with Neuro at [facility]  No date: Heart murmur  No date: HX OTHER MEDICAL      Comment: menieres disease  No date: Hypothyroid  7/2016: Nodule of vagina      Comment: nodule vaginal cuff- repeat US in 3-6 mths  No date: JOAN on CPAP  No date: Thyroid disease      Comment: Padmini Hines MD PCP      Patient is a 38 y.o. female presenting with leg pain. The history is provided by the patient. Leg Pain    Pertinent negatives include no back pain and no neck pain.         Past Medical History:   Diagnosis Date    Asthma     Contact dermatitis and other eczema, due to unspecified cause     ezcema    Depression     Headache     migraines-following with Neuro at [facility]    Heart murmur     HX OTHER MEDICAL     menieres disease    Hypothyroid     Nodule of vagina 7/2016    nodule vaginal cuff- repeat US in 3-6 mths    JOAN on CPAP     Thyroid disease     HYPOTHYROIDISM       Past Surgical History:   Procedure Laterality Date    ABDOMEN SURGERY PROC UNLISTED      HX CHOLECYSTECTOMY  08/2016    HX HYSTERECTOMY  2013    also uterine polyp    LAP,CHOLECYSTECTOMY N/A 08/15/2016    Dr. Barbara Cadet         Family History:   Problem Relation Age of Onset    Diabetes Father        Social History     Social History    Marital status:      Spouse name: N/A    Number of children: N/A    Years of education: N/A     Occupational History    Not on file. 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 HENT: Negative. Negative for congestion and facial swelling. Eyes: Negative for discharge and redness. Respiratory: Negative for cough and shortness of breath. Cardiovascular: Negative for chest pain. Gastrointestinal: Negative for abdominal pain, nausea and vomiting. Endocrine: Negative. Genitourinary: Negative. Musculoskeletal: Negative for back pain and neck pain. Skin: Negative for rash and wound. Allergic/Immunologic: Negative. Neurological: Negative for dizziness, light-headedness and headaches. Hematological: Negative. Psychiatric/Behavioral: Negative. Vitals:    04/05/17 1015   BP: 134/88   Pulse: 80   Resp: 18   Temp: 97.9 °F (36.6 °C)   SpO2: 98%   Weight: 95.3 kg (210 lb)   Height: 5' 3\" (1.6 m)            Physical Exam   Constitutional: She appears well-developed and well-nourished. No distress. HENT:   Head: Normocephalic and atraumatic. Eyes: Conjunctivae are normal.   Cardiovascular: Normal rate and regular rhythm. Pulmonary/Chest: Effort normal and breath sounds normal. No respiratory distress.  She has no wheezes. She has no rales. She exhibits no tenderness. Abdominal: Soft. Bowel sounds are normal.   Musculoskeletal: Normal range of motion. She exhibits no edema, tenderness or deformity. +vericose vein to lateral thigh, no other varicose veins no lower leg. No deformity noted. no swelling/erythema/warmth to right lower leg, ankle and foot. Normal cap refill, normal skin turgor. Sensation intact to bilateral lower legs, no palor, pedal pulses 2+   Neurological: She is alert. Skin: She is not diaphoretic. Nursing note and vitals reviewed. MDM  Number of Diagnoses or Management Options  Thrombophlebitis of superficial veins of right lower extremity:   Diagnosis management comments: Discussed case with Dr. Lay Halo- agrees patient does not meet criteria for LE US at this time. No concern for DVT. Signs/symptoms concerning for superficial thrombophlebitis. Pt given strict return precautions including SOB, CP, swelling, erythema or warmth in leg    Wells Criteria- 1   Discussed treatment plan, return precautions, symptomatic relief, and expected time to improvement. All questions answered. Patient is stable for discharge and outpatient management.    Bryan Calvo PA-C 11:42 AM     ED Course       Procedures

## 2022-10-10 NOTE — PROGRESS NOTES
[facility] in [place], [place]  Inpatient Progress Note      Patient name: Grace Hickey  Patient : 1945  Patient MRN: [id number]  Consulting MD: Gaby Leonardo MD  Date of service: 10/10/22    REASON FOR REFERRAL:  Management of cardiogenic shock     PLAN OF CARE:  66 y/o with h/o new diagnosis of dilated cardiomyopathy, LVEF 10-15%, stage C, NYHA class IIIA presents with VF cardiac arrest c/b acute renal and hepatic failure, extubated, AAOx4 and conversant thus unlikely significant anoxic brain injury with recovering hemodynamics and renal/hepatic function  Stable hemodynamics off Doutamine; hopefully LVEF and hemodynamics remain stable off inotropic support. poor candidate for home inotropes  LHC/RHC  off inotropes with minimal CAD, PCWP 26 and CI of 2.3, will obtain non-invasive NICOM today to see if CI correlates     RECOMMENDATIONS:  Continue midodrine to 7.5mg PO TID for now, will wean as tolerated  If BP stable after midodrine wean, will start low dose BBrx   If BP remains stable off inotropes will start lose dose Entresto   Cont spironolactone 12.5mg daily  Cannot tolerate SGLT2i inhibitor due to UTI- will consider as OP when UTI clears   Continue high dose vitamin D weekly x 12 weeks ( 2023)  Diuretics PRN- will get standing weight today and use that as goal weight at home   Not on allopurinol or uloric, uric acid 3.1  Discontinued heparin due to thrombocytopenia; PVD prophylaxis per primary team  Not on aspirin  Not on statins  Repeat TTE before discharge- ordered  Will need IP evaluation for JOHANA  S/o AICD for SCD prevention per Dr. Denise Monet HF screening labs: hepatitis profile  Gammopathy FLC ratio is slightly elevated, no M Flynn  Will send genetic testing today   PYP equivoval   Nutritionist consult and heart failure education when patient recovers   Recommend flu, covid and pneumonia vaccinations at discharge     Patient assessed.  High suspicion of sleep apnea due to the following reasons. Will refer for sleep evaluation. [x] Heart Failure  [] Hypertention  [] Atrial Fibrillation  [] BMI > 30  [] History of stroke  [] Diabetes  [] Heavy snoring  [] Witnessed apnea  [] Hypoxemia      Remainder of care per primary team,hopefully home tomorrow and will optimize GDMT as OP     IMPRESSION:  Sinus bradycardia, resolved  Acute on chronic HFrEF heart failure  Stage C, NYHA class IV symptoms  Dilated cardiomyopathy, LVEF 10-15%  C/b VF cardiac arrest  C/b cardiogenic shock  C/b renal and hepatic failure  Cardiac risk factors:  HTN  Leukocytosis, improved  Likely UTI (culture not obtained)  Neurocognitive dysfunction  Alzheimer disease  Cannot drive  Memory loss  Vitamin D deficiency     LIFE GOALS:  Lifestyle goals reviewed with the patient. Patient's personal goals include: get stronger   Important upcoming milestones or family events: none at this time  The patient identifies the following as important for living well: being at home, understanding what is happening      INTERVAL HISTORY:  Afebrile  SBP 110s HR 70-80s SR  Weight 106lbs  I/O even  CBC stable   Plt 149  K 4.3  Mg 1.6  Cr 0.79  TBili 0.7     HISTORY OF PRESENT ILLNESS:  Ruthie Espana is a 65 y.o. female with newly diagnosed few weeks ago severe cardiomyopathy LVEF 25-30% was awaiting CT angiogram had cardiac arrest and collapsed at dinner table. CPR was initiated by her  right away and then paramedic within 7 minutes. She was transferred to [facility] [place] initiated on code ice protocol. She was started on diuretics due to significant dyspnea, family described NYHA class IIIB symptoms, which she took for 7 days. CARDIAC IMAGING:  Echo (10/2/22)    Left Ventricle: Severely reduced left ventricular systolic function with a visually estimated EF of 10 -15%. Left ventricle is severely dilated. Normal wall thickness. Severe global hypokinesis present. Right Ventricle:  Moderately reduced systolic function. Left Atrium: Left atrium is mildly dilated. Right Atrium: Right atrium is dilated. Technical qualifiers: Echo study was technically difficult. EKG (10/3/22) NSR, septal infarct  [facility] 10/6/22 mild 1V CAD     HEMODYNAMICS:  [facility] 10/6/22 PAP 53/24/35, RA 9, PCWP 26, CI 2.3   CPEST not done  6MW not done    PYP 10/10/22 Grade 1, ratio 1.045- equivocal     PHYSICAL EXAM:  Visit Vitals  BP (!) 112/54   Pulse 79   Temp 98.2 °F (36.8 °C)   Resp 14   Ht 5' 2.99\" (1.6 m)   Wt 106 lb 7.7 oz (48.3 kg)   SpO2 94%   BMI 18.87 kg/m²     Physical Exam  Vitals and nursing note reviewed. Constitutional:       Appearance: Normal appearance. She is not ill-appearing. Cardiovascular:      Rate and Rhythm: Normal rate and regular rhythm. Pulses: Normal pulses. Heart sounds: Normal heart sounds. Pulmonary:      Effort: Pulmonary effort is normal.      Breath sounds: Normal breath sounds. No rhonchi. Abdominal:      General: There is no distension. Palpations: Abdomen is soft. Musculoskeletal:         General: No swelling. Skin:     General: Skin is warm and dry. Neurological:      General: No focal deficit present. Mental Status: She is alert and oriented to person, place, and time. Psychiatric:         Mood and Affect: Mood normal.        REVIEW OF SYSTEMS:  Review of Systems   Constitutional:  Negative for chills, fever and malaise/fatigue. Respiratory:  Negative for wheezing. Cardiovascular:  Negative for chest pain. Gastrointestinal:  Negative for heartburn and nausea. Musculoskeletal:  Negative for falls. R breast tenderness    Neurological:  Negative for dizziness and headaches. Psychiatric/Behavioral:  Negative for depression.        PAST MEDICAL HISTORY:  Past Medical History:   Diagnosis Date    Arthritis     Chronic pain     \"my right side has been hurting for 3-4 months\"    Hypertension        PAST SURGICAL HISTORY:  Past Surgical History: Procedure Laterality Date    HX BREAST BIOPSY Bilateral     neg    HX BREAST REDUCTION Bilateral 2000    HX GI  2004    colon resection after perforation during ovarian cyst removal    HX HEENT      skin cyst removed from neck    HX HEENT      uvuloplasty    HX HYSTERECTOMY      and bladder repair    HX SHOULDER ARTHROSCOPY      right    HX UROLOGICAL      bladder repair during hysterectomy       FAMILY HISTORY:  No family history on file. SOCIAL HISTORY:  Social History     Socioeconomic History    Marital status:    Tobacco Use    Smoking status: Former     Packs/day: 0.25     Types: Cigarettes     Quit date:      Years since quittin.8    Smokeless tobacco: Never   Substance and Sexual Activity    Alcohol use: No    Drug use: No       LABORATORY RESULTS:     Labs Latest Ref Rng & Units 10/10/2022 10/9/2022 10/8/2022 10/7/2022 10/6/2022 10/5/2022 10/5/2022   WBC 3.6 - 11.0 K/uL 5.6 6.0 6.3 6.8 5.2 - 5.6   RBC 3.80 - 5.20 M/uL 4.39 4.69 4.25 4.38 4.12 - 3.50(L)   Hemoglobin 11.5 - 16.0 g/dL 10. 8(L) 11. 3(L) 10. 6(L) 10. 7(L) 10. 2(L) - 8. 6(L)   Hematocrit 35.0 - 47.0 % 34. 3(L) 36.5 32. 2(L) 33. 6(L) 32. 0(L) - 26. 7(L)   MCV 80.0 - 99.0 FL 78. 1(L) 77. 8(L) 75. 8(L) 76. 7(L) 77. 7(L) - 76. 3(L)   Platelets 124 - 940 K/uL 149(L) 149(L) 147(L) 112(L) 89(L) - 55(L)   Lymphocytes 12 - 49 % 17 18 15 10(L) 17 - 12   Monocytes 5 - 13 % 16(H) 14(H) 19(H) 19(H) 16(H) - 11   Eosinophils 0 - 7 % 3 4 1 1 4 - 3   Basophils 0 - 1 % 0 0 0 0 0 - 0   Bands 0 - 6 % - - - - - - -   Albumin 3.5 - 5.0 g/dL 2. 9(L) 3.4(L) 3. 3(L) 3. 3(L) 3.0(L) 3. 1(L) 2. 8(L)   Calcium 8.5 - 10.1 MG/DL 9.0 9.3 9.1 9.4 9.1 - 8.7   Glucose 65 - 100 mg/dL 85 80 110(H) 100 86 - 91   BUN 6 - 20 MG/DL 10 12 17 16 18 - 22(H)   Creatinine 0.55 - 1.02 MG/DL 0.79 0.95 1.16(H) 1.04(H) 1.00 - 0.89   Sodium 136 - 145 mmol/L 135(L) 134(L) 135(L) 138 140 - 131(L)   Potassium 3.5 - 5.1 mmol/L 4.3 4.0 4.2 4.9 4.4 - 4.4   TSH 0.36 - 3.74 uIU/mL - - - - - - -   Some recent data might be hidden     Lab Results   Component Value Date/Time    TSH 0.41 10/04/2022 06:18 AM       ALLERGY:  Allergies   Allergen Reactions    Iron Other (comments)     IV IRON only gave her convulsions    Lactose Diarrhea        CURRENT MEDICATIONS:    Current Facility-Administered Medications:     magnesium sulfate 2 g/50 ml IVPB (premix or compounded), 2 g, IntraVENous, ONCE, Blanche Mohan MD, Last Rate: 25 mL/hr at 10/10/22 0836, 2 g at 10/10/22 0836    midodrine (PROAMATINE) tablet 7.5 mg, 7.5 mg, Oral, Q8H, Lyn, Norma B, NP, 7.5 mg at 10/10/22 7294    magnesium oxide (MAG-OX) tablet 400 mg, 400 mg, Oral, DAILY, Lyn, Norma B, NP, 400 mg at 10/10/22 0834    [Held by provider] bumetanide (BUMEX) tablet 1 mg, 1 mg, Oral, DAILY, Lyn, Norma B, NP    sodium chloride (NS) flush 5-40 mL, 5-40 mL, IntraVENous, PRN, Jefferson Sandoval MD    HYDROcodone-acetaminophen (NORCO) 5-325 mg per tablet 1 Tablet, 1 Tablet, Oral, Q4H PRN, Flor Simon MD, 1 Tablet at 10/10/22 8851    spironolactone (ALDACTONE) tablet 12.5 mg, 12.5 mg, Oral, DAILY, Nelson Castellanos MD, 12.5 mg at 10/10/22 0834    lidocaine (XYLOCAINE) 4 % cream, , Topical, PRN, Dorrine Gitelman B, MD, Given at 10/07/22 1121    therapeutic multivitamin (THERAGRAN) tablet 1 Tablet, 1 Tablet, Oral, DAILY, Alfonso Broussard MD, 1 Tablet at 10/10/22 0834    ergocalciferol capsule 50,000 Units, 50,000 Units, Oral, Q7D, Nelson Castellanos MD, 50,000 Units at 10/04/22 0915    guaiFENesin (ROBITUSSIN) 100 mg/5 mL oral liquid 100 mg, 100 mg, Oral, Q4H PRN, Dorrine Gitelman B, MD, 100 mg at 10/07/22 1121    lidocaine 4 % patch 1 Patch, 1 Patch, TransDERmal, Q24H, Cesar Dickinson MD, 1 Patch at 10/08/22 0788    alteplase (CATHFLO) 1 mg in sterile water (preservative free) 1 mL injection, 1 mg, InterCATHeter, PRN, Alfonso Broussard MD    sodium chloride (NS) flush 5-40 mL, 5-40 mL, IntraVENous, Q8H, Jarod Liu, DO, 10 mL at 10/09/22 1346    sodium chloride (NS) flush 5-40 mL, 5-40 mL, IntraVENous, PRN, Casper Samuelis, DO    acetaminophen (TYLENOL) tablet 650 mg, 650 mg, Oral, Q6H PRN, 650 mg at 10/10/22 0846 **OR** acetaminophen (TYLENOL) suppository 650 mg, 650 mg, Rectal, Q6H PRN, Casper Samuelis, DO    polyethylene glycol (MIRALAX) packet 17 g, 17 g, Oral, DAILY, Casper Riis, DO, 17 g at 10/09/22 0930    senna-docusate (PERICOLACE) 8.6-50 mg per tablet 1 Tablet, 1 Tablet, Oral, BID, Casper De La Fuente, , 1 Tablet at 10/09/22 7593    magnesium hydroxide (MILK OF MAGNESIA) 400 mg/5 mL oral suspension 30 mL, 30 mL, Oral, DAILY PRN, Casper Riis, DO    sodium phosphate (FLEET'S) enema 1 Enema, 1 Enema, Rectal, DAILY PRN, Casper Samuelis, DO    ondansetron [facility]) injection 4 mg, 4 mg, IntraVENous, Q6H PRN, Casper De La Fuente, DO, 4 mg at 10/07/22 1121    PATIENT CARE TEAM:  Patient Care Team:  Nataliia Silva MD as PCP - General (Family Medicine)  Nataliia Silva MD as PCP - REHABILITATION [facility] Provider     Thank you for allowing me to participate in this patient's care.     Delfino Stone NP   [address] [place] [address], [address]  Phone: [phone]    Total Patient Care Time: 40 minutes No

## 2022-10-11 ENCOUNTER — APPOINTMENT (OUTPATIENT)
Dept: NON INVASIVE DIAGNOSTICS | Age: 77
DRG: 222 | End: 2022-10-11
Attending: NURSE PRACTITIONER
Payer: MEDICARE

## 2022-10-11 LAB
ALBUMIN SERPL-MCNC: 2.8 G/DL (ref 3.5–5)
ALBUMIN/GLOB SERPL: 1 {RATIO} (ref 1.1–2.2)
ALP SERPL-CCNC: 91 U/L (ref 45–117)
ALT SERPL-CCNC: 32 U/L (ref 12–78)
ANION GAP SERPL CALC-SCNC: 5 MMOL/L (ref 5–15)
AST SERPL-CCNC: 177 U/L (ref 15–37)
BASOPHILS # BLD: 0 K/UL (ref 0–0.1)
BASOPHILS NFR BLD: 1 % (ref 0–1)
BILIRUB SERPL-MCNC: 0.5 MG/DL (ref 0.2–1)
BNP SERPL-MCNC: ABNORMAL PG/ML
BUN SERPL-MCNC: 10 MG/DL (ref 6–20)
BUN/CREAT SERPL: 12 (ref 12–20)
CALCIUM SERPL-MCNC: 9.3 MG/DL (ref 8.5–10.1)
CHLORIDE SERPL-SCNC: 104 MMOL/L (ref 97–108)
CO2 SERPL-SCNC: 26 MMOL/L (ref 21–32)
CREAT SERPL-MCNC: 0.81 MG/DL (ref 0.55–1.02)
CRP SERPL-MCNC: 0.71 MG/DL (ref 0–0.6)
DIFFERENTIAL METHOD BLD: ABNORMAL
EOSINOPHIL # BLD: 0.2 K/UL (ref 0–0.4)
EOSINOPHIL NFR BLD: 5 % (ref 0–7)
ERYTHROCYTE [DISTWIDTH] IN BLOOD BY AUTOMATED COUNT: 13.5 % (ref 11.5–14.5)
GLOBULIN SER CALC-MCNC: 2.8 G/DL (ref 2–4)
GLUCOSE SERPL-MCNC: 91 MG/DL (ref 65–100)
HCT VFR BLD AUTO: 32 % (ref 35–47)
HGB BLD-MCNC: 9.9 G/DL (ref 11.5–16)
IMM GRANULOCYTES # BLD AUTO: 0 K/UL (ref 0–0.04)
IMM GRANULOCYTES NFR BLD AUTO: 1 % (ref 0–0.5)
INR PPP: 1 (ref 0.9–1.1)
LYMPHOCYTES # BLD: 0.8 K/UL (ref 0.8–3.5)
LYMPHOCYTES NFR BLD: 17 % (ref 12–49)
MAGNESIUM SERPL-MCNC: 1.8 MG/DL (ref 1.6–2.4)
MCH RBC QN AUTO: 24.3 PG (ref 26–34)
MCHC RBC AUTO-ENTMCNC: 30.9 G/DL (ref 30–36.5)
MCV RBC AUTO: 78.4 FL (ref 80–99)
MONOCYTES # BLD: 0.6 K/UL (ref 0–1)
MONOCYTES NFR BLD: 14 % (ref 5–13)
NEUTS SEG # BLD: 3 K/UL (ref 1.8–8)
NEUTS SEG NFR BLD: 62 % (ref 32–75)
NRBC # BLD: 0 K/UL (ref 0–0.01)
NRBC BLD-RTO: 0 PER 100 WBC
PHOSPHATE SERPL-MCNC: 3 MG/DL (ref 2.6–4.7)
PLATELET # BLD AUTO: 140 K/UL (ref 150–400)
PMV BLD AUTO: 11 FL (ref 8.9–12.9)
POTASSIUM SERPL-SCNC: 4 MMOL/L (ref 3.5–5.1)
PROT SERPL-MCNC: 5.6 G/DL (ref 6.4–8.2)
PROTHROMBIN TIME: 10.7 SEC (ref 9–11.1)
RBC # BLD AUTO: 4.08 M/UL (ref 3.8–5.2)
RBC MORPH BLD: ABNORMAL
SODIUM SERPL-SCNC: 135 MMOL/L (ref 136–145)
WBC # BLD AUTO: 4.6 K/UL (ref 3.6–11)

## 2022-10-11 PROCEDURE — C8923 2D TTE W OR W/O FOL W/CON,CO: HCPCS

## 2022-10-11 PROCEDURE — 74011250637 HC RX REV CODE- 250/637: Performed by: INTERNAL MEDICINE

## 2022-10-11 PROCEDURE — 36415 COLL VENOUS BLD VENIPUNCTURE: CPT

## 2022-10-11 PROCEDURE — 74011000250 HC RX REV CODE- 250: Performed by: ANESTHESIOLOGY

## 2022-10-11 PROCEDURE — 74011000250 HC RX REV CODE- 250: Performed by: STUDENT IN AN ORGANIZED HEALTH CARE EDUCATION/TRAINING PROGRAM

## 2022-10-11 PROCEDURE — 97530 THERAPEUTIC ACTIVITIES: CPT

## 2022-10-11 PROCEDURE — 84100 ASSAY OF PHOSPHORUS: CPT

## 2022-10-11 PROCEDURE — 74011250637 HC RX REV CODE- 250/637: Performed by: FAMILY MEDICINE

## 2022-10-11 PROCEDURE — 97116 GAIT TRAINING THERAPY: CPT

## 2022-10-11 PROCEDURE — 80053 COMPREHEN METABOLIC PANEL: CPT

## 2022-10-11 PROCEDURE — 74011000250 HC RX REV CODE- 250: Performed by: FAMILY MEDICINE

## 2022-10-11 PROCEDURE — 65270000046 HC RM TELEMETRY

## 2022-10-11 PROCEDURE — 83735 ASSAY OF MAGNESIUM: CPT

## 2022-10-11 PROCEDURE — 74011250637 HC RX REV CODE- 250/637: Performed by: NURSE PRACTITIONER

## 2022-10-11 PROCEDURE — 85610 PROTHROMBIN TIME: CPT

## 2022-10-11 PROCEDURE — 74011250637 HC RX REV CODE- 250/637: Performed by: STUDENT IN AN ORGANIZED HEALTH CARE EDUCATION/TRAINING PROGRAM

## 2022-10-11 PROCEDURE — 74011250636 HC RX REV CODE- 250/636: Performed by: FAMILY MEDICINE

## 2022-10-11 PROCEDURE — 74011250637 HC RX REV CODE- 250/637: Performed by: EMERGENCY MEDICINE

## 2022-10-11 PROCEDURE — 83880 ASSAY OF NATRIURETIC PEPTIDE: CPT

## 2022-10-11 PROCEDURE — 86140 C-REACTIVE PROTEIN: CPT

## 2022-10-11 PROCEDURE — 85025 COMPLETE CBC W/AUTO DIFF WBC: CPT

## 2022-10-11 RX ORDER — MIDODRINE HYDROCHLORIDE 5 MG/1
10 TABLET ORAL EVERY 8 HOURS
Status: DISCONTINUED | OUTPATIENT
Start: 2022-10-11 | End: 2022-10-13 | Stop reason: HOSPADM

## 2022-10-11 RX ADMIN — PERFLUTREN 1.5 ML: 6.52 INJECTION, SUSPENSION INTRAVENOUS at 13:00

## 2022-10-11 RX ADMIN — ACETAMINOPHEN 650 MG: 325 TABLET, FILM COATED ORAL at 22:24

## 2022-10-11 RX ADMIN — ERGOCALCIFEROL 50000 UNITS: 1.25 CAPSULE ORAL at 10:20

## 2022-10-11 RX ADMIN — MIDODRINE HYDROCHLORIDE 10 MG: 5 TABLET ORAL at 21:14

## 2022-10-11 RX ADMIN — MIDODRINE HYDROCHLORIDE 7.5 MG: 5 TABLET ORAL at 14:26

## 2022-10-11 RX ADMIN — POLYETHYLENE GLYCOL 3350 17 G: 17 POWDER, FOR SOLUTION ORAL at 09:00

## 2022-10-11 RX ADMIN — THERA TABS 1 TABLET: TAB at 09:00

## 2022-10-11 RX ADMIN — SODIUM CHLORIDE, PRESERVATIVE FREE 10 ML: 5 INJECTION INTRAVENOUS at 16:15

## 2022-10-11 RX ADMIN — ACETAMINOPHEN 650 MG: 325 TABLET, FILM COATED ORAL at 13:49

## 2022-10-11 RX ADMIN — SPIRONOLACTONE 12.5 MG: 25 TABLET ORAL at 09:00

## 2022-10-11 RX ADMIN — HYDROCODONE BITARTRATE AND ACETAMINOPHEN 1 TABLET: 5; 325 TABLET ORAL at 21:14

## 2022-10-11 RX ADMIN — SENNOSIDES AND DOCUSATE SODIUM 1 TABLET: 50; 8.6 TABLET ORAL at 09:00

## 2022-10-11 RX ADMIN — MAGNESIUM OXIDE 400 MG (241.3 MG MAGNESIUM) TABLET 400 MG: TABLET at 09:00

## 2022-10-11 NOTE — PROGRESS NOTES
Cardiology Progress Note                                        Admit Date: 10/2/2022    Assessment/Plan:     S/p cardiac arrest; now s/p ICD, no issues  CHF; improving; RHC with elevated LVEDP and PASP  Thrombocytopenia; stable   Cardiomyopathy; severe; weaning midodrine and adding GDMT    Nadege Donahue is a 65 y.o. female with     PROBLEM LIST:  Patient Active Problem List    Diagnosis Date Noted    [facility] 10/04/2022    Cardiac arrest ([facility] 10/01/2022         Subjective:     Nadege Donahue reports none. Visit Vitals  /62   Pulse 84   Temp 97.8 °F (36.6 °C)   Resp 19   Ht 5' 2.99\" (1.6 m)   Wt 48.1 kg (106 lb 0.7 oz)   SpO2 94%   BMI 18.79 kg/m²       Intake/Output Summary (Last 24 hours) at 10/11/2022 1128  Last data filed at 10/11/2022 0600  Gross per 24 hour   Intake --   Output 1025 ml   Net -1025 ml         Objective:      Physical Exam:  HEENT: Perrla, EOMI  Neck: No JVD,  No thyroidmegaly  Resp: CTA bilaterally;  No wheezes or rales  CV: RRR s1s2 No murmur, + s3  Abd:Soft, Nontender  Ext: No edema  Neuro: Alert and oriented; Nonfocal  Skin: Warm, Dry, Intact  Pulses: 2+ DP/PT/Rad      Telemetry: normal sinus rhythm    Current Facility-Administered Medications   Medication Dose Route Frequency    midodrine (PROAMATINE) tablet 7.5 mg  7.5 mg Oral Q8H    magnesium oxide (MAG-OX) tablet 400 mg  400 mg Oral DAILY    [Held by provider] bumetanide (BUMEX) tablet 1 mg  1 mg Oral DAILY    sodium chloride (NS) flush 5-40 mL  5-40 mL IntraVENous PRN    HYDROcodone-acetaminophen (NORCO) 5-325 mg per tablet 1 Tablet  1 Tablet Oral Q4H PRN    spironolactone (ALDACTONE) tablet 12.5 mg  12.5 mg Oral DAILY    lidocaine (XYLOCAINE) 4 % cream   Topical PRN    therapeutic multivitamin (THERAGRAN) tablet 1 Tablet  1 Tablet Oral DAILY    ergocalciferol capsule 50,000 Units  50,000 Units Oral Q7D    guaiFENesin (ROBITUSSIN) 100 mg/5 mL oral liquid 100 mg  100 mg Oral Q4H PRN    lidocaine 4 % patch 1 Patch  1 Patch TransDERmal Q24H    alteplase (CATHFLO) 1 mg in sterile water (preservative free) 1 mL injection  1 mg InterCATHeter PRN    sodium chloride (NS) flush 5-40 mL  5-40 mL IntraVENous Q8H    sodium chloride (NS) flush 5-40 mL  5-40 mL IntraVENous PRN    acetaminophen (TYLENOL) tablet 650 mg  650 mg Oral Q6H PRN    Or    acetaminophen (TYLENOL) suppository 650 mg  650 mg Rectal Q6H PRN    polyethylene glycol (MIRALAX) packet 17 g  17 g Oral DAILY    senna-docusate (PERICOLACE) 8.6-50 mg per tablet 1 Tablet  1 Tablet Oral BID    magnesium hydroxide (MILK OF MAGNESIA) 400 mg/5 mL oral suspension 30 mL  30 mL Oral DAILY PRN    sodium phosphate (FLEET'S) enema 1 Enema  1 Enema Rectal DAILY PRN    ondansetron (ZOFRAN) injection 4 mg  4 mg IntraVENous Q6H PRN         Data Review:   Labs:    Recent Results (from the past 24 hour(s))   NUCLEAR CARDIAC AMYLOID SPECT    Collection Time: 10/10/22 12:58 PM   Result Value Ref Range    Stress Target  bpm   URINALYSIS W/MICROSCOPIC    Collection Time: 10/10/22  3:36 PM   Result Value Ref Range    Color YELLOW/STRAW      Appearance CLEAR CLEAR      Specific gravity 1.006 1.003 - 1.030      pH (UA) 7.0 5.0 - 8.0      Protein Negative NEG mg/dL    Glucose Negative NEG mg/dL    Ketone Negative NEG mg/dL    Bilirubin Negative NEG      Blood Negative NEG      Urobilinogen 0.2 0.2 - 1.0 EU/dL    Nitrites Negative NEG      Leukocyte Esterase Negative NEG      WBC 0-4 0 - 4 /hpf    RBC 0-5 0 - 5 /hpf    Epithelial cells MODERATE (A) FEW /lpf    Bacteria Negative NEG /hpf    Hyaline cast 0-2 0 - 5 /lpf   URINE CULTURE HOLD SAMPLE    Collection Time: 10/10/22  3:36 PM    Specimen: Serum; Urine   Result Value Ref Range    Urine culture hold        Urine on hold in Microbiology dept for 2 days. If unpreserved urine is submitted, it cannot be used for addtional testing after 24 hours, recollection will be required.    METABOLIC PANEL, COMPREHENSIVE    Collection Time: 10/11/22  4:14 AM   Result Value Ref Range    Sodium 135 (L) 136 - 145 mmol/L    Potassium 4.0 3.5 - 5.1 mmol/L    Chloride 104 97 - 108 mmol/L    CO2 26 21 - 32 mmol/L    Anion gap 5 5 - 15 mmol/L    Glucose 91 65 - 100 mg/dL    BUN 10 6 - 20 MG/DL    Creatinine 0.81 0.55 - 1.02 MG/DL    BUN/Creatinine ratio 12 12 - 20      eGFR >60 >60 ml/min/1.73m2    Calcium 9.3 8.5 - 10.1 MG/DL    Bilirubin, total 0.5 0.2 - 1.0 MG/DL    ALT (SGPT) 32 12 - 78 U/L    AST (SGOT) 177 (H) 15 - 37 U/L    Alk. phosphatase 91 45 - 117 U/L    Protein, total 5.6 (L) 6.4 - 8.2 g/dL    Albumin 2.8 (L) 3.5 - 5.0 g/dL    Globulin 2.8 2.0 - 4.0 g/dL    A-G Ratio 1.0 (L) 1.1 - 2.2     MAGNESIUM    Collection Time: 10/11/22  4:14 AM   Result Value Ref Range    Magnesium 1.8 1.6 - 2.4 mg/dL   PHOSPHORUS    Collection Time: 10/11/22  4:14 AM   Result Value Ref Range    Phosphorus 3.0 2.6 - 4.7 MG/DL   CBC WITH AUTOMATED DIFF    Collection Time: 10/11/22  4:14 AM   Result Value Ref Range    WBC 4.6 3.6 - 11.0 K/uL    RBC 4.08 3.80 - 5.20 M/uL    HGB 9.9 (L) 11.5 - 16.0 g/dL    HCT 32.0 (L) 35.0 - 47.0 %    MCV 78.4 (L) 80.0 - 99.0 FL    MCH 24.3 (L) 26.0 - 34.0 PG    MCHC 30.9 30.0 - 36.5 g/dL    RDW 13.5 11.5 - 14.5 %    PLATELET 509 (L) 685 - 400 K/uL    MPV 11.0 8.9 - 12.9 FL    NRBC 0.0 0  WBC    ABSOLUTE NRBC 0.00 0.00 - 0.01 K/uL    NEUTROPHILS 62 32 - 75 %    LYMPHOCYTES 17 12 - 49 %    MONOCYTES 14 (H) 5 - 13 %    EOSINOPHILS 5 0 - 7 %    BASOPHILS 1 0 - 1 %    IMMATURE GRANULOCYTES 1 (H) 0.0 - 0.5 %    ABS. NEUTROPHILS 3.0 1.8 - 8.0 K/UL    ABS. LYMPHOCYTES 0.8 0.8 - 3.5 K/UL    ABS. MONOCYTES 0.6 0.0 - 1.0 K/UL    ABS. EOSINOPHILS 0.2 0.0 - 0.4 K/UL    ABS. BASOPHILS 0.0 0.0 - 0.1 K/UL    ABS. IMM.  GRANS. 0.0 0.00 - 0.04 K/UL    DF SMEAR SCANNED      RBC COMMENTS NORMOCYTIC, NORMOCHROMIC     PROTHROMBIN TIME + INR    Collection Time: 10/11/22  4:14 AM   Result Value Ref Range    INR 1.0 0.9 - 1.1      Prothrombin time 10.7 9.0 - 11.1 sec   C REACTIVE PROTEIN, QT    Collection Time: 10/11/22  4:14 AM   Result Value Ref Range    C-Reactive protein 0.71 (H) 0.00 - 0.60 mg/dL   NT-PRO BNP    Collection Time: 10/11/22  4:14 AM   Result Value Ref Range    NT pro-BNP 15,130 (H) <450 PG/ML

## 2022-10-11 NOTE — PROGRESS NOTES
[facility] Adult  Hospitalist Group                 Hospitalist Progress Note  Srinivas Guillermo MD  Answering service: [phone] OR [phone] from in house phone        Date of Service:  10/11/2022  NAME:  Fely Norman  :    MRN:  [id number]      Admission Summary:   77F with Alzheimer disease, HTN, presenting after witnessed cardiac arrest at home.  immediately started CPR, EMS then found Vifb with Shock x1, followed by asystole. ROSC < 5minutes reported. EMS transported to [facility] , [place] with pulse for 30 seconds started on amio gtt. CTH negative. Moved to [facility] CCU for further management. Patient is post heart cath and AICD placement and off inotropes and transferred to the hospitalist team in the early AM hours of 10/7/22 as an ICU downgrade       Interval history / Subjective:   Patient seen and examined, she denies shortness of breath and reports mild pain in rib cage likely related to prior CPR, he does not remember how she ended up in the hospital and was surprised to hear that she has been here for 9 days  cardiac issues being managed by cardiology and advanced heart failure team  Likely DC home today if ECHO can get done     Assessment & Plan:     V Fib Cardiac arrest prior to admission with CHF based on workup this admission  CHF- new diagnosis of dilated cardiomyopathy, LVEF 10-15%, stage C, NYHA class IIIA    off dobutamine - hypotensive post cath and BP now low stable on midodrine Q8  Heart cath 10/6/22 with following results= Mild one vessel CAD, Rt dominant   Severe LV systolic dysfunction, EF 15 to 20%;  Elevated LVEDP; Mild pulmonary HTN  10/6/22 - ICD implant for Cardiomyopathy LVEF =10 %  & Secondary prevention post cardiac arrest for V fib  Currently Bumex on hold and she is not on rate control meds  Medical management per cardiology and AHF team     Pulm- stable on RA     GI- Diet as tolerated     Renal- creatinine was mildly elevated post procedures and heart cath   But is now normal after holding Bumex diuretics, remains on low-dose spironolactone    Anemia/ thrombocytopenia-   Impressive drop in plt count attributed to heparin drip d/madiha 10/4, plt count improving, HIT negative  - anemia NOS- monitor Hb- low stable today- no evidence of any GI bleeding      UA positive on admission, completed 3 day course of IV ceftriaxone  Hx of dementia- no evidence of ICU delerium    Low mag- will replete IV, patient has been started on oral magnesium as well  Left sided rib cage pain- IV dilaudid given- suspected costochrondritis from CPR     OT/PT eval and CM consult for DC planning- looks like home with home health when cleared by cardiology and heart failure teams    Code status: Full  Prophylaxis: SCDs  Care Plan discussed with: intensivist  Anticipated Disposition: 24-48hrs     Hospital Problems  Never Reviewed            Codes Class Noted POA    Acute systolic heart failure ([facility] [address] ICD-10-CM: I50.21  ICD-9-CM: 428.21  10/4/2022 Unknown        Cardiac arrest [facility]) ICD-10-CM: I46.9  ICD-9-CM: 427.5  10/1/2022 Unknown         Review of Systems:   A comprehensive review of systems was negative except for that written in the HPI. Vital Signs:    Last 24hrs VS reviewed since prior progress note.  Most recent are:  Visit Vitals  /61 (BP 1 Location: Left upper arm, BP Patient Position: At rest)   Pulse 76   Temp 97.6 °F (36.4 °C)   Resp 16   Ht 5' 2.99\" (1.6 m)   Wt 48.1 kg (106 lb 0.7 oz)   SpO2 94%   BMI 18.79 kg/m²     Patient Vitals for the past 24 hrs:   Temp Pulse Resp BP SpO2   10/11/22 0700 -- 76 16 -- --   10/11/22 0600 97.6 °F (36.4 °C) 74 15 119/61 --   10/11/22 0500 -- 74 14 (!) 94/56 --   10/11/22 0300 -- 75 15 -- --   10/11/22 0200 -- 74 13 -- --   10/11/22 0100 -- 67 17 -- --   10/11/22 0000 97.8 °F (36.6 °C) 69 11 121/68 --   10/10/22 2300 -- 74 13 -- --   10/10/22 2200 -- 80 15 -- --   10/10/22 2100 -- 84 14 119/71 94 %   10/10/22 2000 -- 76 18 111/67 97 %   10/10/22 1800 -- 79 18 120/68 97 %   10/10/22 1700 -- 77 15 121/70 95 %   10/10/22 1600 97.7 °F (36.5 °C) 75 (!) 0 115/63 95 %   10/10/22 1500 -- 79 18 109/60 97 %   10/10/22 1400 -- 85 15 121/61 96 %   10/10/22 1200 98.3 °F (36.8 °C) 85 20 (!) 94/57 94 %   10/10/22 1100 -- 82 24 102/61 95 %          Intake/Output Summary (Last 24 hours) at 10/11/2022 0919  Last data filed at 10/11/2022 0600  Gross per 24 hour   Intake --   Output 1025 ml   Net -1025 ml          Physical Examination:     I had a face to face encounter with this patient and independently examined them on 10/11/2022 as outlined below:          Constitutional:  No acute distress, cooperative, pleasant    HEENT:  EOMI, Oral mucosa moist, oropharynx benign. Resp:  CTA bilaterally. No wheezing/rhonchi/rales. No accessory muscle use. CV:  Regular rhythm, normal rate, no murmurs, gallops, rubs, dressing on left upper chest from AICD placement- CDI without swelling    GI:  Soft, non distended, non tender. normoactive bowel sounds,    Musculoskeletal:  No edema, warm, 2+ pulses throughout    Neurologic:  Moves all extremities. AAOx3, CN II-XII reviewed  Psych: flat affect, normal mood            Data Review:    Review and/or order of clinical lab test  Review and/or order of tests in the radiology section of CPT  Review and/or order of tests in the medicine section of CPT  CXR Results                   10/06/22 2050  XR CHEST PORT Final result    Impression:  1. Cardiac assist device in the left chest has a single lead which projects to   terminate in the right ventricle. Narrative:  INDICATION: On return to floor. . Evaluate for pneumothorax   Additional history:   COMPARISON: Previous chest xray, 10/4/2022 and 10/2/2022. LIMITATIONS: Portable technique. Vearl Pippins    FINDINGS: Single frontal view of the chest.    .   Lines/tubes/surgical: The cardiac assist device in the left chest has a single   lead which projects to terminate in the right ventricle. Interval removal of a   right IJ approach catheter. Heart/mediastinum: Unremarkable. Lungs/pleura:  No focal consolidation or mass. No visualized pleural effusion or   pneumothorax. Additional Comments: None. .              Results       Procedure Component Value Units Date/Time    URINE CULTURE HOLD SAMPLE [[id number]] Collected: 10/10/22 1536    Order Status: Completed Specimen: Urine from Serum Updated: 10/10/22 1711     Urine culture hold       Urine on hold in Microbiology dept for 2 days. If unpreserved urine is submitted, it cannot be used for addtional testing after 24 hours, recollection will be required. CULTURE, BLOOD #1 [[id number]] Collected: 10/02/22 0653    Order Status: Completed Specimen: Blood Updated: 10/07/22 1622     Special Requests: NO SPECIAL REQUESTS        Culture result: NO GROWTH 5 DAYS       CULTURE, BLOOD #2 [[id number]] Collected: 10/02/22 0653    Order Status: Completed Specimen: Blood Updated: 10/07/22 1622     Special Requests: NO SPECIAL REQUESTS        Culture result: NO GROWTH 5 DAYS       URINE CULTURE HOLD SAMPLE [[id number]] Collected: 10/01/22 2156    Order Status: Completed Specimen: Urine from Serum Updated: 10/01/22 2203     Urine culture hold       Urine on hold in Microbiology dept for 2 days. If unpreserved urine is submitted, it cannot be used for addtional testing after 24 hours, recollection will be required.                    Labs:     Recent Labs     10/11/22  0414 10/10/22  0502   WBC 4.6 5.6   HGB 9.9* 10.8*   HCT 32.0* 34.3*   * 149*       Recent Labs     10/11/22  0414 10/10/22  0502 10/09/22  0430   * 135* 134*   K 4.0 4.3 4.0    103 100   CO2 26 26 27   BUN 10 10 12   CREA 0.81 0.79 0.95   GLU 91 85 80   CA 9.3 9.0 9.3   MG 1.8 1.6 1.9   PHOS 3.0 3.0 2.5*       Recent Labs     10/11/22  0414 10/10/22  0502 10/09/22  0430   ALT 32 26 28   AP 91 93 97   TBILI 0.5 0.7 0.9   TP 5. 6* 5.6* 6.4   ALB 2.8* 2.9* 3.4*   GLOB 2.8 2.7 3.0       Recent Labs     10/11/22  0414 10/10/22  0502 10/09/22  0430   INR 1.0 1.1 1.1   PTP 10.7 11.1 11.4*        No results for input(s): FE, TIBC, PSAT, FERR in the last 72 hours. No results found for: FOL, RBCF   No results for input(s): PH, PCO2, PO2 in the last 72 hours. No results for input(s): CPK, CKNDX, TROIQ in the last 72 hours.     No lab exists for component: CPKMB  No results found for: CHOL, CHOLX, CHLST, CHOLV, HDL, HDLP, LDL, LDLC, DLDLP, TGLX, TRIGL, TRIGP, CHHD, CHHDX  Lab Results   Component Value Date/Time    Glucose (POC) 126 (H) 10/03/2022 02:10 PM    Glucose (POC) 143 (H) 10/03/2022 04:20 AM    Glucose (POC) 192 (H) 10/02/2022 11:56 PM    Glucose (POC) 191 (H) 10/02/2022 06:36 PM    Glucose,  (H) 10/01/2022 09:18 PM     Lab Results   Component Value Date/Time    Color YELLOW/STRAW 10/10/2022 03:36 PM    Appearance CLEAR 10/10/2022 03:36 PM    Specific gravity 1.006 10/10/2022 03:36 PM    Specific gravity 1.020 10/01/2022 09:56 PM    pH (UA) 7.0 10/10/2022 03:36 PM    Protein Negative 10/10/2022 03:36 PM    Glucose Negative 10/10/2022 03:36 PM    Ketone Negative 10/10/2022 03:36 PM    Bilirubin Negative 10/10/2022 03:36 PM    Urobilinogen 0.2 10/10/2022 03:36 PM    Nitrites Negative 10/10/2022 03:36 PM    Leukocyte Esterase Negative 10/10/2022 03:36 PM    Epithelial cells MODERATE (A) 10/10/2022 03:36 PM    Bacteria Negative 10/10/2022 03:36 PM    WBC 0-4 10/10/2022 03:36 PM    RBC 0-5 10/10/2022 03:36 PM         Medications Reviewed:     Current Facility-Administered Medications   Medication Dose Route Frequency    midodrine (PROAMATINE) tablet 7.5 mg  7.5 mg Oral Q8H    magnesium oxide (MAG-OX) tablet 400 mg  400 mg Oral DAILY    [Held by provider] bumetanide (BUMEX) tablet 1 mg  1 mg Oral DAILY    sodium chloride (NS) flush 5-40 mL  5-40 mL IntraVENous PRN    HYDROcodone-acetaminophen (NORCO) 5-325 mg per tablet 1 Tablet  1 Tablet Oral Q4H PRN    spironolactone (ALDACTONE) tablet 12.5 mg  12.5 mg Oral DAILY    lidocaine (XYLOCAINE) 4 % cream   Topical PRN    therapeutic multivitamin (THERAGRAN) tablet 1 Tablet  1 Tablet Oral DAILY    ergocalciferol capsule 50,000 Units  50,000 Units Oral Q7D    guaiFENesin (ROBITUSSIN) 100 mg/5 mL oral liquid 100 mg  100 mg Oral Q4H PRN    lidocaine 4 % patch 1 Patch  1 Patch TransDERmal Q24H    alteplase (CATHFLO) 1 mg in sterile water (preservative free) 1 mL injection  1 mg InterCATHeter PRN    sodium chloride (NS) flush 5-40 mL  5-40 mL IntraVENous Q8H    sodium chloride (NS) flush 5-40 mL  5-40 mL IntraVENous PRN    acetaminophen (TYLENOL) tablet 650 mg  650 mg Oral Q6H PRN    Or    acetaminophen (TYLENOL) suppository 650 mg  650 mg Rectal Q6H PRN    polyethylene glycol (MIRALAX) packet 17 g  17 g Oral DAILY    senna-docusate (PERICOLACE) 8.6-50 mg per tablet 1 Tablet  1 Tablet Oral BID    magnesium hydroxide (MILK OF MAGNESIA) 400 mg/5 mL oral suspension 30 mL  30 mL Oral DAILY PRN    sodium phosphate (FLEET'S) enema 1 Enema  1 Enema Rectal DAILY PRN    ondansetron (ZOFRAN) injection 4 mg  4 mg IntraVENous Q6H PRN     ______________________________________________________________________  EXPECTED LENGTH OF STAY: 4d 2h  ACTUAL LENGTH OF STAY:          9                 David Ferreira MD

## 2022-10-11 NOTE — PROGRESS NOTES
Problem: Mobility Impaired (Adult and Pediatric)  Goal: *Acute Goals and Plan of Care (Insert Text)  Description: FUNCTIONAL STATUS PRIOR TO ADMISSION: Patient was independent and active without use of DME. Ambulating community distances. Denies fall hx. Per family, pt sometimes uses chair lift to 2nd floor bedroom if \"feeling weak that day\". Pt with pmh of alzheimer's. HOME SUPPORT PRIOR TO ADMISSION: The patient lived with spouse but did not require assist.    Physical Therapy Goals  Initiated 10/4/2022. Continue goals x 7 days  1. Patient will move from supine to sit and sit to supine  in bed with modified independence within 7 day(s). 2.  Patient will transfer from bed to chair and chair to bed with modified independence using the least restrictive device within 7 day(s). 3.  Patient will perform sit to stand with modified independence within 7 day(s). 4.  Patient will ambulate with modified independence for 300 feet with the least restrictive device within 7 day(s). 5.  Patient will ascend/descend 12 stairs with 1 handrail(s) with supervision/set-up within 7 day(s). Outcome: Progressing Towards Goal     PHYSICAL THERAPY TREATMENT  Patient: Pau Matt (24 y.o. female)  Date: 10/11/2022  Diagnosis: Cardiac arrest ([facility] [address] [I46.9] <principal problem not specified>  Procedure(s) (LRB):  INSERT ICD SINGLE (N/A) 5 Days Post-Op  Precautions: Other (comment), Fall (PPM precautions)  Chart, physical therapy assessment, plan of care and goals were reviewed. ASSESSMENT  Patient continues with skilled PT services and is progressing towards goals. Pt generally mobilized at a SPV level during today's session. Pt received sitting in bedside chair, on RA, on tele, family in room, agreeable to work with therapy. Pt able to verbalize 1/4 PPM precautions and partially remembers 1/4 (could recall there was a limit on amount she could lift but not the value).  Pt and family educated on remainder of precautions and family verbalized understanding and will assist with reinforcement. Pt performed 6MWT (see below). Despite education of parameters of test, pt tended to pause for other people in hallway and took extremely wide turns thus was likely able to ambulate farther than demonstrated in test. Pt then returned to room, remained up in chair and family and pt extensively educated on d/c planning, fall prevention, and activity modification. At this time do not hold d/c from a PT perspective. Current Level of Function Impacting Discharge (mobility/balance): SPV for OOB activity however has good family support and will have near 24/7 support at home    Other factors to consider for discharge: PLOF, alzheimer's         PLAN :  Patient continues to benefit from skilled intervention to address the above impairments. Continue treatment per established plan of care. to address goals. Recommendation for discharge: (in order for the patient to meet his/her long term goals)  Physical therapy at least 2 days/week in the home     This discharge recommendation:  Has been made in collaboration with the attending provider and/or case management    IF patient discharges home will need the following DME: RW, (ordered)       SUBJECTIVE:   Patient stated \"How did I do?    OBJECTIVE DATA SUMMARY:   Critical Behavior:  Neurologic State: Alert, Confused  Orientation Level: Oriented to place, Oriented to time, Oriented to person, Disoriented to situation  Cognition: Follows commands, Decreased attention/concentration  Safety/Judgement: Awareness of environment, Home safety  Functional Mobility Training:  Bed Mobility:     Supine to Sit: Other (comment) (NT, started and ended up in chair)  Sit to Supine: Other (comment) (NT, started and ended up in chair)           Transfers:  Sit to Stand: Supervision  Stand to Sit: Supervision                             Balance:  Sitting: Intact  Standing: Intact; With support  Standing - Static: Good;Constant support  Standing - Dynamic : Good;Constant support  Ambulation/Gait Training:  Distance (ft): 500 Feet (ft)  Assistive Device: Gait belt;Walker, rolling           Gait Abnormalities: Decreased step clearance; Path deviations        Base of Support: Center of gravity altered        Step Length: Right shortened;Left shortened         Functional Measure:   6 MWT results: (Specify if any supplemental oxygen is used, the type, pre, during and post sats.)  Distance Walked in Feet (ft): 580 ft. Jatin Rating of Perceived exertion (0-10 point scale):   Pre Heart Rate: 88 Beginnin   Pre O2 Saturation: 98 (on RA)     Mid walk: 3   Post Heart Rate: 127 End: 6   Post O2 Saturation: 98    Assistive device used: Assistive Device: Gait belt;Walker, rolling        Normative data:   Men 39-80 years old = 1889 feet; Women 3680 years rrg=0097 feet  Modified 10 point Jatin RPE scale utilized:  0 = no breathlessness at all ---> 10 = maximum exertion  Please refer to the flowsheet for any additional vital signs taken during this treatment. Pain Rating:  Pt did not verbalize pain during session. Activity Tolerance:   Good, tolerates ADLs without rest breaks, and SpO2 stable on RA    After treatment patient left in no apparent distress:   Sitting in chair, Call bell within reach, and Caregiver / family present    COMMUNICATION/COLLABORATION:   The patients plan of care was discussed with: Registered nurse.      Maude Delgado, PT   Time Calculation: 39 mins

## 2022-10-11 NOTE — PROGRESS NOTES
0730 Bedside and Verbal shift change report given to Marily Todd (oncoming nurse) by Madan Maher (offgoing nurse). Report included the following information SBAR, Kardex, Procedure Summary, Intake/Output, MAR, Accordion, and Recent Results.

## 2022-10-11 NOTE — PROGRESS NOTES
Comprehensive Nutrition Assessment    Type and Reason for Visit: Reassess    Nutrition Recommendations/Plan:     Continue regular diet - added 2gm Na as family noting pt receiving salt on trays. Continue ONS - pt drinking ONS. Malnutrition Assessment:  Malnutrition Status: At risk for malnutrition (specify) (d/t Alzheimer's) (10/03/22 8355)           Nutrition Assessment:    Per history: \"77F with Alzheimer disease, HTN, presenting after witnessed cardiac arrest at home.  immediately started CPR, EMS then found Vifb with Shock x1, followed by asystole. ROSC < 5minutes reported. EMS transported to [facility] , [place] with pulse for 30 seconds started on amio gtt. CTH negative. Moved to [facility] CCU for further management. \"      10/11: Pt with spouse and daughter in the room. Pt may d/c later today if ECHO gets done. Daughter requesting information regarding low Na diet - handouts provided for low Na, how to make no sodium seasoning. Pt eating better, didn't have a whole lot for breakfast, but has been doing better in general. Added 2 gm Na to diet. Continue ONS. Daughter reports pts wt up a few pounds which is desirable as she had lost wt PTA. Discussed eating/cooking at home, avoiding salty foods, or making a no salt version at home like popcorn. Pt likes \"Paydays\" which are \"salty sweet. \"     10/3: Visited pt, met pt's  and daughter who were also in the room. I explained the process of the tube feeding, daughter asked about calories pt was receiving; daughter and  voiced understanding of what we discussed. Pt's  reports that pt has lost some weight in the past year since she started developing Alzheimer's, usually weighed about 110-115 pounds. Pt is currently sedated on the vent, was not a candidate for code ice. Current rate of diprivan is fairly low, just supplying ~ 160 fat kcals.   Pt with h/o beginning stages of Alzheimer's, HTN, unsure of neuro status at this point. Pt has 2cal HN ordered at 20 ml/hr; will adjust tube feeds to Osmolite 1.2 (since pt is on fairly high rate of Levophed and do not want to use fiber-containing formula). Unit RD will continue to follow, please see above for tube feeding adjustments. Nutritionally Significant Medications:  Current Facility-Administered Medications   Medication Dose Route Frequency    midodrine (PROAMATINE) tablet 7.5 mg  7.5 mg Oral Q8H    magnesium oxide (MAG-OX) tablet 400 mg  400 mg Oral DAILY    [Held by provider] bumetanide (BUMEX) tablet 1 mg  1 mg Oral DAILY    spironolactone (ALDACTONE) tablet 12.5 mg  12.5 mg Oral DAILY    therapeutic multivitamin (THERAGRAN) tablet 1 Tablet  1 Tablet Oral DAILY    ergocalciferol capsule 50,000 Units  50,000 Units Oral Q7D    lidocaine 4 % patch 1 Patch  1 Patch TransDERmal Q24H    sodium chloride (NS) flush 5-40 mL  5-40 mL IntraVENous Q8H    polyethylene glycol (MIRALAX) packet 17 g  17 g Oral DAILY    senna-docusate (PERICOLACE) 8.6-50 mg per tablet 1 Tablet  1 Tablet Oral BID           Estimated Daily Nutrient Needs:  Energy Requirements Based On: Kcal/kg  Weight Used for Energy Requirements: Current  Energy (kcal/day): ~ 1667-9346 kcals (25-30 kcals/kg d/t low weight)  Weight Used for Protein Requirements: Current  Protein (g/day): 1.2 gm pro/kg or ~ 60 gms  Method Used for Fluid Requirements: 1 ml/kcal  Fluid (ml/day): 1 ml/kcal    Nutrition Related Findings:   Edema:   No data recorded   Last BM: 10/09/22, Loose, Soft    Wounds: None      Current Nutrition Therapies:  Diet: NPO, tube feeds  Supplements: none  Meal intake: Patient Vitals for the past 168 hrs:   % Diet Eaten   10/10/22 0838 26 - 50%   10/09/22 1200 51 - 75%   10/09/22 0800 51 - 75%   10/08/22 1200 51 - 75%   10/08/22 0900 51 - 75%   10/07/22 1800 1 - 25%   10/07/22 1300 1 - 25%   10/07/22 0800 1 - 25%   10/05/22 0800 1 - 25%     Supplement intake: No data found.       Anthropometric Measures:  Height: 5' 2.99\" (160 cm)  Ideal Body Weight (IBW): 115 lbs (52 kg)  Admission Body Weight: 107 lb 12.9 oz  Current Body Wt:  48.1 kg (106 lb), 93.7 % IBW. Current BMI (kg/m2): 18.8        Weight Adjustment: No adjustment                 BMI Category: Underweight (BMI less than 22) age over 72    Wt Readings from Last 10 Encounters:   10/10/22 48.1 kg (106 lb 0.7 oz)   10/01/22 50.3 kg (111 lb)   08/29/18 49.9 kg (110 lb)           Nutrition Diagnosis:    Inadequate oral intake related to impaired respiratory function as evidenced by intubation, nutrition support-enteral nutrition    Nutrition Interventions:   Food and/or Nutrient Delivery: Modify tube feeding  Nutrition Education/Counseling: No recommendations at this time  Coordination of Nutrition Care: Continue to monitor while inpatient, Interdisciplinary rounds       Goals: Consume >50% of meals and maintain or gain 1 lbs over next week. Nutrition Monitoring and Evaluation:   Behavioral-Environmental Outcomes: None identified  Food/Nutrient Intake Outcomes: Diet advancement/tolerance, Enteral nutrition intake/tolerance  Physical Signs/Symptoms Outcomes: Biochemical data, GI status, Weight, Fluid status or edema    Discharge Planning:     Too soon to determine    Rudie Hodgkin, RD,   Available via [address] [place] [address]

## 2022-10-12 ENCOUNTER — TELEPHONE (OUTPATIENT)
Dept: CARDIOLOGY CLINIC | Age: 77
End: 2022-10-12

## 2022-10-12 ENCOUNTER — APPOINTMENT (OUTPATIENT)
Dept: GENERAL RADIOLOGY | Age: 77
DRG: 222 | End: 2022-10-12
Attending: EMERGENCY MEDICINE
Payer: MEDICARE

## 2022-10-12 LAB
ALBUMIN SERPL-MCNC: 3 G/DL (ref 3.5–5)
ALBUMIN/GLOB SERPL: 0.9 {RATIO} (ref 1.1–2.2)
ALP SERPL-CCNC: 105 U/L (ref 45–117)
ALT SERPL-CCNC: 33 U/L (ref 12–78)
ANION GAP SERPL CALC-SCNC: 2 MMOL/L (ref 5–15)
AST SERPL-CCNC: 89 U/L (ref 15–37)
BASOPHILS # BLD: 0 K/UL (ref 0–0.1)
BASOPHILS NFR BLD: 1 % (ref 0–1)
BILIRUB SERPL-MCNC: 0.6 MG/DL (ref 0.2–1)
BNP SERPL-MCNC: ABNORMAL PG/ML
BUN SERPL-MCNC: 11 MG/DL (ref 6–20)
BUN/CREAT SERPL: 14 (ref 12–20)
CALCIUM SERPL-MCNC: 9.5 MG/DL (ref 8.5–10.1)
CHLORIDE SERPL-SCNC: 106 MMOL/L (ref 97–108)
CO2 SERPL-SCNC: 26 MMOL/L (ref 21–32)
CREAT SERPL-MCNC: 0.79 MG/DL (ref 0.55–1.02)
CRP SERPL-MCNC: 0.7 MG/DL (ref 0–0.6)
DIFFERENTIAL METHOD BLD: ABNORMAL
ECHO LV EDV A2C: 171 ML
ECHO LV EDV A4C: 202 ML
ECHO LV EDV BP: 191 ML (ref 56–104)
ECHO LV EDV INDEX A4C: 138 ML/M2
ECHO LV EDV INDEX BP: 131 ML/M2
ECHO LV EDV NDEX A2C: 117 ML/M2
ECHO LV EJECTION FRACTION A2C: 21 %
ECHO LV EJECTION FRACTION A4C: 11 %
ECHO LV EJECTION FRACTION BIPLANE: 14 % (ref 55–100)
ECHO LV ESV A2C: 136 ML
ECHO LV ESV A4C: 179 ML
ECHO LV ESV BP: 164 ML (ref 19–49)
ECHO LV ESV INDEX A2C: 93 ML/M2
ECHO LV ESV INDEX A4C: 123 ML/M2
ECHO LV ESV INDEX BP: 112 ML/M2
ECHO LV FRACTIONAL SHORTENING: 3 % (ref 28–44)
ECHO LV INTERNAL DIMENSION DIASTOLE INDEX: 4.32 CM/M2
ECHO LV INTERNAL DIMENSION DIASTOLIC: 6.3 CM (ref 3.9–5.3)
ECHO LV INTERNAL DIMENSION SYSTOLIC INDEX: 4.18 CM/M2
ECHO LV INTERNAL DIMENSION SYSTOLIC: 6.1 CM
ECHO LV IVSD: 1 CM (ref 0.6–0.9)
ECHO LV MASS 2D: 234.7 G (ref 67–162)
ECHO LV MASS INDEX 2D: 160.8 G/M2 (ref 43–95)
ECHO LV POSTERIOR WALL DIASTOLIC: 0.8 CM (ref 0.6–0.9)
ECHO LV RELATIVE WALL THICKNESS RATIO: 0.25
EOSINOPHIL # BLD: 0.2 K/UL (ref 0–0.4)
EOSINOPHIL NFR BLD: 5 % (ref 0–7)
ERYTHROCYTE [DISTWIDTH] IN BLOOD BY AUTOMATED COUNT: 13.6 % (ref 11.5–14.5)
GLOBULIN SER CALC-MCNC: 3.2 G/DL (ref 2–4)
GLUCOSE SERPL-MCNC: 89 MG/DL (ref 65–100)
HCT VFR BLD AUTO: 33.1 % (ref 35–47)
HGB BLD-MCNC: 10.3 G/DL (ref 11.5–16)
IMM GRANULOCYTES # BLD AUTO: 0 K/UL (ref 0–0.04)
IMM GRANULOCYTES NFR BLD AUTO: 1 % (ref 0–0.5)
INR PPP: 1 (ref 0.9–1.1)
LYMPHOCYTES # BLD: 1 K/UL (ref 0.8–3.5)
LYMPHOCYTES NFR BLD: 26 % (ref 12–49)
MAGNESIUM SERPL-MCNC: 1.6 MG/DL (ref 1.6–2.4)
MCH RBC QN AUTO: 25.2 PG (ref 26–34)
MCHC RBC AUTO-ENTMCNC: 31.1 G/DL (ref 30–36.5)
MCV RBC AUTO: 80.9 FL (ref 80–99)
MONOCYTES # BLD: 0.6 K/UL (ref 0–1)
MONOCYTES NFR BLD: 15 % (ref 5–13)
NEUTS SEG # BLD: 2.1 K/UL (ref 1.8–8)
NEUTS SEG NFR BLD: 52 % (ref 32–75)
NRBC # BLD: 0 K/UL (ref 0–0.01)
NRBC BLD-RTO: 0 PER 100 WBC
PHOSPHATE SERPL-MCNC: 3 MG/DL (ref 2.6–4.7)
PLATELET # BLD AUTO: 154 K/UL (ref 150–400)
PMV BLD AUTO: 11.8 FL (ref 8.9–12.9)
POTASSIUM SERPL-SCNC: 4.4 MMOL/L (ref 3.5–5.1)
PROT SERPL-MCNC: 6.2 G/DL (ref 6.4–8.2)
PROTHROMBIN TIME: 10.7 SEC (ref 9–11.1)
RBC # BLD AUTO: 4.09 M/UL (ref 3.8–5.2)
SODIUM SERPL-SCNC: 134 MMOL/L (ref 136–145)
WBC # BLD AUTO: 3.9 K/UL (ref 3.6–11)

## 2022-10-12 PROCEDURE — 86140 C-REACTIVE PROTEIN: CPT

## 2022-10-12 PROCEDURE — 80053 COMPREHEN METABOLIC PANEL: CPT

## 2022-10-12 PROCEDURE — 93308 TTE F-UP OR LMTD: CPT | Performed by: INTERNAL MEDICINE

## 2022-10-12 PROCEDURE — 65270000046 HC RM TELEMETRY

## 2022-10-12 PROCEDURE — 74011000250 HC RX REV CODE- 250: Performed by: ANESTHESIOLOGY

## 2022-10-12 PROCEDURE — 74011250637 HC RX REV CODE- 250/637: Performed by: EMERGENCY MEDICINE

## 2022-10-12 PROCEDURE — 74011250637 HC RX REV CODE- 250/637: Performed by: INTERNAL MEDICINE

## 2022-10-12 PROCEDURE — 36415 COLL VENOUS BLD VENIPUNCTURE: CPT

## 2022-10-12 PROCEDURE — 85610 PROTHROMBIN TIME: CPT

## 2022-10-12 PROCEDURE — 93325 DOPPLER ECHO COLOR FLOW MAPG: CPT | Performed by: INTERNAL MEDICINE

## 2022-10-12 PROCEDURE — 74011250636 HC RX REV CODE- 250/636: Performed by: INTERNAL MEDICINE

## 2022-10-12 PROCEDURE — 85025 COMPLETE CBC W/AUTO DIFF WBC: CPT

## 2022-10-12 PROCEDURE — 83735 ASSAY OF MAGNESIUM: CPT

## 2022-10-12 PROCEDURE — 74011250637 HC RX REV CODE- 250/637: Performed by: FAMILY MEDICINE

## 2022-10-12 PROCEDURE — 83880 ASSAY OF NATRIURETIC PEPTIDE: CPT

## 2022-10-12 PROCEDURE — 84100 ASSAY OF PHOSPHORUS: CPT

## 2022-10-12 PROCEDURE — 74011250637 HC RX REV CODE- 250/637: Performed by: NURSE PRACTITIONER

## 2022-10-12 PROCEDURE — 74011000250 HC RX REV CODE- 250: Performed by: STUDENT IN AN ORGANIZED HEALTH CARE EDUCATION/TRAINING PROGRAM

## 2022-10-12 PROCEDURE — 74011250637 HC RX REV CODE- 250/637: Performed by: STUDENT IN AN ORGANIZED HEALTH CARE EDUCATION/TRAINING PROGRAM

## 2022-10-12 PROCEDURE — 71045 X-RAY EXAM CHEST 1 VIEW: CPT

## 2022-10-12 RX ORDER — SACUBITRIL AND VALSARTAN 24; 26 MG/1; MG/1
1 TABLET, FILM COATED ORAL 2 TIMES DAILY
COMMUNITY
End: 2022-10-13

## 2022-10-12 RX ORDER — MEMANTINE HYDROCHLORIDE 10 MG/1
10 TABLET ORAL 2 TIMES DAILY
COMMUNITY

## 2022-10-12 RX ORDER — IBUPROFEN 600 MG/1
600 TABLET ORAL 4 TIMES DAILY
COMMUNITY
Start: 2022-08-21 | End: 2022-10-13

## 2022-10-12 RX ORDER — HYDROCODONE BITARTRATE AND ACETAMINOPHEN 5; 325 MG/1; MG/1
1 TABLET ORAL
Status: DISCONTINUED | OUTPATIENT
Start: 2022-10-12 | End: 2022-10-13 | Stop reason: HOSPADM

## 2022-10-12 RX ORDER — CARVEDILOL 3.12 MG/1
3.12 TABLET ORAL 2 TIMES DAILY WITH MEALS
COMMUNITY
End: 2022-10-13

## 2022-10-12 RX ORDER — DICLOFENAC SODIUM 75 MG/1
75 TABLET, DELAYED RELEASE ORAL 2 TIMES DAILY
COMMUNITY
Start: 2022-09-20 | End: 2022-10-13

## 2022-10-12 RX ORDER — HYOSCYAMINE SULFATE 0.38 MG/1
0.38 TABLET, EXTENDED RELEASE ORAL 2 TIMES DAILY
COMMUNITY
Start: 2022-09-01

## 2022-10-12 RX ORDER — ALENDRONATE SODIUM 70 MG/1
70 TABLET ORAL
COMMUNITY
Start: 2022-09-24

## 2022-10-12 RX ORDER — FUROSEMIDE 20 MG/1
20 TABLET ORAL DAILY
COMMUNITY
End: 2022-10-13

## 2022-10-12 RX ORDER — TRAMADOL HYDROCHLORIDE 50 MG/1
50 TABLET ORAL
Status: DISCONTINUED | OUTPATIENT
Start: 2022-10-12 | End: 2022-10-13 | Stop reason: HOSPADM

## 2022-10-12 RX ADMIN — THERA TABS 1 TABLET: TAB at 08:48

## 2022-10-12 RX ADMIN — TRAMADOL HYDROCHLORIDE 50 MG: 50 TABLET ORAL at 21:03

## 2022-10-12 RX ADMIN — POLYETHYLENE GLYCOL 3350 17 G: 17 POWDER, FOR SOLUTION ORAL at 08:54

## 2022-10-12 RX ADMIN — SODIUM CHLORIDE, PRESERVATIVE FREE 10 ML: 5 INJECTION INTRAVENOUS at 12:48

## 2022-10-12 RX ADMIN — MIDODRINE HYDROCHLORIDE 10 MG: 5 TABLET ORAL at 13:00

## 2022-10-12 RX ADMIN — HYDROCODONE BITARTRATE AND ACETAMINOPHEN 1 TABLET: 5; 325 TABLET ORAL at 02:28

## 2022-10-12 RX ADMIN — SODIUM CHLORIDE 250 ML: 9 INJECTION, SOLUTION INTRAVENOUS at 12:47

## 2022-10-12 RX ADMIN — MIDODRINE HYDROCHLORIDE 10 MG: 5 TABLET ORAL at 21:03

## 2022-10-12 RX ADMIN — SPIRONOLACTONE 12.5 MG: 25 TABLET ORAL at 08:48

## 2022-10-12 RX ADMIN — SENNOSIDES AND DOCUSATE SODIUM 1 TABLET: 50; 8.6 TABLET ORAL at 08:47

## 2022-10-12 RX ADMIN — MAGNESIUM OXIDE 400 MG (241.3 MG MAGNESIUM) TABLET 400 MG: TABLET at 08:47

## 2022-10-12 RX ADMIN — MIDODRINE HYDROCHLORIDE 10 MG: 5 TABLET ORAL at 07:06

## 2022-10-12 NOTE — PROGRESS NOTES
10/11/22 2000   Gali Fall Risk   Mobility 1.0   Mobility Interventions Assess mobility with egress test;Communicate number of staff needed for ambulation/transfer   Mentation 1   Mentation Interventions Toileting rounds;Room close to nurse's station; Reorient patient;Update white board; Increase mobility;More frequent rounding;Evaluate medications/consider consulting pharmacy; Door open when patient unattended;Bed/chair exit alarm; Adequate sleep, hydration, pain control   Medication 1   Medication Interventions Patient to call before getting OOB; Teach patient to arise slowly; Evaluate medications/consider consulting pharmacy   Elimination 1.0   Elimination Interventions Call light in reach; Toilet paper/wipes in reach; Toileting schedule/hourly rounds   Prior Fall History 0   Total Score 4   Standard Fall Precautions Yes   High Fall Risk Yes     Patient is high fall risk, please coordinate with PT for six minute walk test.

## 2022-10-12 NOTE — PROGRESS NOTES
ADVANCED HEART FAILURE NOTE     Patient seen and examined, d/w family. Feels well, no complaints  Afebrile, -110s/60-70s, HR 80s  I/O neg negative 825cc, urine 1 liters  CBC and BMP stable  Pro-NT-BNP 15,130  Echo LVEF 10-15%  PYP equivocal  6MW 580 feet     Plan:  Continue midodrine 10mg PO TID  250cc IVF and obtain orthostatics  Would not use home inotropes; if QOL acceptable okay to discharge home  Patient would see us in clinic within 2-3 days  D/w family that LVEF may improve over the course of 3 months; however, if the heart function does not improve or patient condition deteriorates would reconsider goals of care and hospice at home.      Umer Castellon MD  F Cardiology

## 2022-10-12 NOTE — PROGRESS NOTES
10/12/22 0800   Gali Fall Risk   Mobility 1.0   Mobility Interventions Communicate number of staff needed for ambulation/transfer   Mentation 0   Mentation Interventions Evaluate medications/consider consulting pharmacy   Medication 1   Medication Interventions Evaluate medications/consider consulting pharmacy   Elimination 1.0   Elimination Interventions Call light in reach   Prior Fall History 0   Total Score 3   Standard Fall Precautions Yes   High Fall Risk Yes     Patient is high fall risk, please coordinate with PT for six minute walk test.

## 2022-10-12 NOTE — PROGRESS NOTES
JOHN: Anticipate discharge home with rolling walker and HH PT/OT through [facility] pending medical progress. Transportation likely in car with /daughter. RUR: 13%     Emergency contact: Zane Stone, spouse, [phone]     Disposition: Patient admitted 10/2 for cardiac arrest. Patient is from home where she lives with her .  is primary caregiver. Patient does have a history of dementia. Patient had a left and right heart cath and placement of AICD last week. Blood pressures being monitored. CM has ordered walker and it will be delivered to hospital tomorrow unless patient is discharged today. Then it will be delivered to her home. CM met with patient and family at bedside today to provide an update. Medicare pt has received, reviewed, and signed 2nd IM letter informing them of their right to appeal the discharge. Signed copied has been placed on pt bedside chart.      Lori Waggoner, [address] [place] [address],[address]  [phone]

## 2022-10-12 NOTE — PROGRESS NOTES
Occupational Therapy  10/12/2022    Noted pt just recently had orthostatic BPs done by nursing staff. Pt was hypotensive and symptomatic while EOB and now getting a fluid bolus. Will follow back as able. Frida Gimenez MS, OTR/L

## 2022-10-12 NOTE — PROGRESS NOTES
ADVANCED HEART FAILURE    Patient seen and examined, d/w family.     Feels well, no complaints  Afebrile, -110s/60-70s, HR 80s  I/O neg negative 825cc, urine 1 liters  CBC and BMP stable  Pro-NT-BNP 15,130  Echo results pending  PYP equivocal    Plan:  Continue midodrine 10mg PO TID  Obtain 6 minute walk  Hold diuretics; obtain orthostatics  Continue spironolactone 12.5mg daily    Nando Mckeon MD  [facility]

## 2022-10-12 NOTE — PROGRESS NOTES
0730 Bedside and Verbal shift change report given to Cat RN (oncoming nurse) by Shane Pratt RN (offgoing nurse). Report included the following information SBAR.     0945 Orthostatic BPs ordered by Kathia Ramirez MD.     1000 Orthostatic BPs obtained. See flowsheets. Denisse Montero MD at bedside. NICOM ordered. Hilaria Neri 477 at bedside. Spirolactone order held. [address] ordered a 250cc Normal Saline bolus. [address] at bedside. No orders received. 1930 Bedside and Verbal shift change report given to [facility] (oncoming nurse) by Cat RN (offgoing nurse). Report included the following information SBAR.

## 2022-10-12 NOTE — PROGRESS NOTES
[address] [facility]. [place], [address]   Tel.  [phone]   Fax. 100 [facility] 60   [place], [address] [facility]   Tel.  [phone]   Fax. [phone] [address] [facility] [address]    Tel.  [phone]   Fax. [phone]         Subjective:     Ortiz Patel is a 65y.o. year old female seen in-patient for sleep evaluation at the request of Annmarie Segundo NP in collaboration with the Heart Failure Nurse Navigator. Evaluation was completed with assistance from patients  and daughter. She has experienced excessive daytime sleepiness for several years and it has stayed the same. The sleepiness is described as being mild, she has been taking naps during the day. The patient notes that she will experience frequent awakening from sleep. In general she is able to return to sleep after awakening, she tends to awaken spontaneously. The patient has not undergone diagnostic testing for the current problems. Active Problems List   NYHA Class IV with an EF of 10-15%  HTN  Alzheimer disease    Assessment:     [place] Sleepiness Score: 11     Stop Bang 10/12/2022   Does the patient snore loudly (louder than talking or loud enough to be heard through closed doors)? 0   Does the patient often feel tired, fatigued, or sleepy during the daytime, even after a \"good\" night's sleep? 1   Has anyone ever observed the patient stop breathing during their sleep?  0   Does the patient have or are they being treated for high blood pressure? 1   Is the patient's BMI greater than 35? 0   Is your neck circumference greater than 17 inches (Male) or 16 inches (Female)? 0   Is the patient older than 48? 1   Is the patient male? 0   JOHANA Score 3       Plan:     The patient currently has significant risk for having sleep apnea. Sleep testing will be scheduled for 10/24/22 at the [place] sleep center.        She was provided information on the correlation between sleep apnea and heart failure. Corresponding risk factors for sleep apnea and the importance of proper treatment were reviewed. Reviewed how treating sleep apnea can help improve heart function     The patient was counseled regarding proper sleep hygiene, with emphasis on ensuring sufficient total sleep time; safe driving and the benefits of exercise and weight loss. All of her questions were addressed. LON Bains  Electronically signed.  10/12/22

## 2022-10-12 NOTE — PROGRESS NOTES
Per multiple provider notes on 10/11 and 10/12 stating pt needs to complete six minute walk test, please refer to PT note from 10/11 for results of six minute walk test.    Yemi Ram PT, DPT

## 2022-10-12 NOTE — TELEPHONE ENCOUNTER
Spoke with patient's spouse to move appt on 10/18 to 10:00am instead of 10:30am per provider    Patient agreed

## 2022-10-12 NOTE — PROGRESS NOTES
Cardiology Progress Note                                        Admit Date: 10/2/2022    Assessment/Plan:     S/p cardiac arrest; now s/p ICD, no issues  CHF; improving; RHC with elevated LVEDP and PASP  Thrombocytopenia; stable   Cardiomyopathy; severe; as per AHF    Brenda Rubio is a 65 y.o. female with     PROBLEM LIST:  Patient Active Problem List    Diagnosis Date Noted    Acute systolic heart failure ([facility] [address] 10/04/2022    Cardiac arrest ([facility] [address] 10/01/2022         Subjective:     Brenda Rubio reports none. Visit Vitals  BP (!) 97/52   Pulse 72   Temp 97.9 °F (36.6 °C)   Resp 14   Ht 5' 2\" (1.575 m)   Wt 48 kg (105 lb 13.1 oz)   SpO2 96%   BMI 19.35 kg/m²       Intake/Output Summary (Last 24 hours) at 10/12/2022 1610  Last data filed at 10/12/2022 1418  Gross per 24 hour   Intake 830 ml   Output 500 ml   Net 330 ml         Objective:      Physical Exam:  HEENT: P[place], EOMI  Neck: No JVD,  No thyroidmegaly  Resp: CTA bilaterally;  No wheezes or rales  CV: RRR s1s2 No murmur, + s3  Abd:Soft, Nontender  Ext: No edema  Neuro: Alert and oriented; Nonfocal  Skin: Warm, Dry, Intact  Pulses: 2+ DP/PT/Rad      Telemetry: normal sinus rhythm    Current Facility-Administered Medications   Medication Dose Route Frequency    traMADoL (ULTRAM) tablet 50 mg  50 mg Oral Q6H PRN    HYDROcodone-acetaminophen (NORCO) 5-325 mg per tablet 1 Tablet  1 Tablet Oral Q4H PRN    midodrine (PROAMATINE) tablet 10 mg  10 mg Oral Q8H    magnesium oxide (MAG-OX) tablet 400 mg  400 mg Oral DAILY    [Held by provider] bumetanide (BUMEX) tablet 1 mg  1 mg Oral DAILY    sodium chloride (NS) flush 5-40 mL  5-40 mL IntraVENous PRN    [Held by provider] spironolactone (ALDACTONE) tablet 12.5 mg  12.5 mg Oral DAILY    lidocaine (XYLOCAINE) 4 % cream   Topical PRN    therapeutic multivitamin (THERAGRAN) tablet 1 Tablet  1 Tablet Oral DAILY    ergocalciferol capsule 50,000 Units  50,000 Units Oral Q7D    guaiFENesin (ROBITUSSIN) 100 mg/5 mL oral liquid 100 mg  100 mg Oral Q4H PRN    lidocaine 4 % patch 1 Patch  1 Patch TransDERmal Q24H    alteplase (CATHFLO) 1 mg in sterile water (preservative free) 1 mL injection  1 mg InterCATHeter PRN    sodium chloride (NS) flush 5-40 mL  5-40 mL IntraVENous Q8H    sodium chloride (NS) flush 5-40 mL  5-40 mL IntraVENous PRN    acetaminophen (TYLENOL) tablet 650 mg  650 mg Oral Q6H PRN    Or    acetaminophen (TYLENOL) suppository 650 mg  650 mg Rectal Q6H PRN    polyethylene glycol (MIRALAX) packet 17 g  17 g Oral DAILY    senna-docusate (PERICOLACE) 8.6-50 mg per tablet 1 Tablet  1 Tablet Oral BID    magnesium hydroxide (MILK OF MAGNESIA) 400 mg/5 mL oral suspension 30 mL  30 mL Oral DAILY PRN    sodium phosphate (FLEET'S) enema 1 Enema  1 Enema Rectal DAILY PRN    ondansetron (ZOFRAN) injection 4 mg  4 mg IntraVENous Q6H PRN         Data Review:   Labs:    Recent Results (from the past 24 hour(s))   METABOLIC PANEL, COMPREHENSIVE    Collection Time: 10/12/22  5:30 AM   Result Value Ref Range    Sodium 134 (L) 136 - 145 mmol/L    Potassium 4.4 3.5 - 5.1 mmol/L    Chloride 106 97 - 108 mmol/L    CO2 26 21 - 32 mmol/L    Anion gap 2 (L) 5 - 15 mmol/L    Glucose 89 65 - 100 mg/dL    BUN 11 6 - 20 MG/DL    Creatinine 0.79 0.55 - 1.02 MG/DL    BUN/Creatinine ratio 14 12 - 20      eGFR >60 >60 ml/min/1.73m2    Calcium 9.5 8.5 - 10.1 MG/DL    Bilirubin, total 0.6 0.2 - 1.0 MG/DL    ALT (SGPT) 33 12 - 78 U/L    AST (SGOT) 89 (H) 15 - 37 U/L    Alk.  phosphatase 105 45 - 117 U/L    Protein, total 6.2 (L) 6.4 - 8.2 g/dL    Albumin 3.0 (L) 3.5 - 5.0 g/dL    Globulin 3.2 2.0 - 4.0 g/dL    A-G Ratio 0.9 (L) 1.1 - 2.2     MAGNESIUM    Collection Time: 10/12/22  5:30 AM   Result Value Ref Range    Magnesium 1.6 1.6 - 2.4 mg/dL   PHOSPHORUS    Collection Time: 10/12/22  5:30 AM   Result Value Ref Range    Phosphorus 3.0 2.6 - 4.7 MG/DL   CBC WITH AUTOMATED DIFF    Collection Time: 10/12/22  5:30 AM   Result Value Ref Range    WBC 3.9 3.6 - 11.0 K/uL    RBC 4.09 3.80 - 5.20 M/uL    HGB 10.3 (L) 11.5 - 16.0 g/dL    HCT 33.1 (L) 35.0 - 47.0 %    MCV 80.9 80.0 - 99.0 FL    MCH 25.2 (L) 26.0 - 34.0 PG    MCHC 31.1 30.0 - 36.5 g/dL    RDW 13.6 11.5 - 14.5 %    PLATELET 676 602 - 491 K/uL    MPV 11.8 8.9 - 12.9 FL    NRBC 0.0 0  WBC    ABSOLUTE NRBC 0.00 0.00 - 0.01 K/uL    NEUTROPHILS 52 32 - 75 %    LYMPHOCYTES 26 12 - 49 %    MONOCYTES 15 (H) 5 - 13 %    EOSINOPHILS 5 0 - 7 %    BASOPHILS 1 0 - 1 %    IMMATURE GRANULOCYTES 1 (H) 0.0 - 0.5 %    ABS. NEUTROPHILS 2.1 1.8 - 8.0 K/UL    ABS. LYMPHOCYTES 1.0 0.8 - 3.5 K/UL    ABS. MONOCYTES 0.6 0.0 - 1.0 K/UL    ABS. EOSINOPHILS 0.2 0.0 - 0.4 K/UL    ABS. BASOPHILS 0.0 0.0 - 0.1 K/UL    ABS. IMM.  GRANS. 0.0 0.00 - 0.04 K/UL    DF AUTOMATED     PROTHROMBIN TIME + INR    Collection Time: 10/12/22  5:30 AM   Result Value Ref Range    INR 1.0 0.9 - 1.1      Prothrombin time 10.7 9.0 - 11.1 sec   NT-PRO BNP    Collection Time: 10/12/22  5:30 AM   Result Value Ref Range    NT pro-BNP 10,424 (H) <450 PG/ML   C REACTIVE PROTEIN, QT    Collection Time: 10/12/22  5:30 AM   Result Value Ref Range    C-Reactive protein 0.70 (H) 0.00 - 0.60 mg/dL

## 2022-10-13 VITALS
WEIGHT: 105.38 LBS | SYSTOLIC BLOOD PRESSURE: 107 MMHG | BODY MASS INDEX: 19.39 KG/M2 | RESPIRATION RATE: 22 BRPM | TEMPERATURE: 98.1 F | DIASTOLIC BLOOD PRESSURE: 63 MMHG | HEIGHT: 62 IN | HEART RATE: 77 BPM | OXYGEN SATURATION: 99 %

## 2022-10-13 DIAGNOSIS — G47.33 OSA (OBSTRUCTIVE SLEEP APNEA): Primary | ICD-10-CM

## 2022-10-13 LAB
ALBUMIN SERPL-MCNC: 2.7 G/DL (ref 3.5–5)
ALBUMIN/GLOB SERPL: 0.8 {RATIO} (ref 1.1–2.2)
ALP SERPL-CCNC: 102 U/L (ref 45–117)
ALT SERPL-CCNC: 28 U/L (ref 12–78)
ANION GAP SERPL CALC-SCNC: 7 MMOL/L (ref 5–15)
AST SERPL-CCNC: 51 U/L (ref 15–37)
BASOPHILS # BLD: 0 K/UL (ref 0–0.1)
BASOPHILS NFR BLD: 1 % (ref 0–1)
BILIRUB SERPL-MCNC: 0.4 MG/DL (ref 0.2–1)
BNP SERPL-MCNC: ABNORMAL PG/ML
BUN SERPL-MCNC: 12 MG/DL (ref 6–20)
BUN/CREAT SERPL: 17 (ref 12–20)
CALCIUM SERPL-MCNC: 9.1 MG/DL (ref 8.5–10.1)
CHLORIDE SERPL-SCNC: 105 MMOL/L (ref 97–108)
CO2 SERPL-SCNC: 24 MMOL/L (ref 21–32)
CREAT SERPL-MCNC: 0.69 MG/DL (ref 0.55–1.02)
CRP SERPL-MCNC: <0.29 MG/DL (ref 0–0.6)
DIFFERENTIAL METHOD BLD: ABNORMAL
EOSINOPHIL # BLD: 0.2 K/UL (ref 0–0.4)
EOSINOPHIL NFR BLD: 4 % (ref 0–7)
ERYTHROCYTE [DISTWIDTH] IN BLOOD BY AUTOMATED COUNT: 13.9 % (ref 11.5–14.5)
GLOBULIN SER CALC-MCNC: 3.3 G/DL (ref 2–4)
GLUCOSE SERPL-MCNC: 84 MG/DL (ref 65–100)
HCT VFR BLD AUTO: 31.6 % (ref 35–47)
HGB BLD-MCNC: 9.8 G/DL (ref 11.5–16)
IMM GRANULOCYTES # BLD AUTO: 0 K/UL (ref 0–0.04)
IMM GRANULOCYTES NFR BLD AUTO: 0 % (ref 0–0.5)
INR PPP: 1 (ref 0.9–1.1)
LYMPHOCYTES # BLD: 1 K/UL (ref 0.8–3.5)
LYMPHOCYTES NFR BLD: 23 % (ref 12–49)
MAGNESIUM SERPL-MCNC: 1.8 MG/DL (ref 1.6–2.4)
MCH RBC QN AUTO: 24.3 PG (ref 26–34)
MCHC RBC AUTO-ENTMCNC: 31 G/DL (ref 30–36.5)
MCV RBC AUTO: 78.4 FL (ref 80–99)
MONOCYTES # BLD: 0.6 K/UL (ref 0–1)
MONOCYTES NFR BLD: 14 % (ref 5–13)
NEUTS SEG # BLD: 2.5 K/UL (ref 1.8–8)
NEUTS SEG NFR BLD: 58 % (ref 32–75)
NRBC # BLD: 0 K/UL (ref 0–0.01)
NRBC BLD-RTO: 0 PER 100 WBC
PHOSPHATE SERPL-MCNC: 3 MG/DL (ref 2.6–4.7)
PLATELET # BLD AUTO: 147 K/UL (ref 150–400)
PMV BLD AUTO: 10.7 FL (ref 8.9–12.9)
POTASSIUM SERPL-SCNC: 4.5 MMOL/L (ref 3.5–5.1)
PROT SERPL-MCNC: 6 G/DL (ref 6.4–8.2)
PROTHROMBIN TIME: 10.6 SEC (ref 9–11.1)
RBC # BLD AUTO: 4.03 M/UL (ref 3.8–5.2)
SODIUM SERPL-SCNC: 136 MMOL/L (ref 136–145)
WBC # BLD AUTO: 4.2 K/UL (ref 3.6–11)

## 2022-10-13 PROCEDURE — 74011000250 HC RX REV CODE- 250: Performed by: STUDENT IN AN ORGANIZED HEALTH CARE EDUCATION/TRAINING PROGRAM

## 2022-10-13 PROCEDURE — 97530 THERAPEUTIC ACTIVITIES: CPT

## 2022-10-13 PROCEDURE — 85610 PROTHROMBIN TIME: CPT

## 2022-10-13 PROCEDURE — 84100 ASSAY OF PHOSPHORUS: CPT

## 2022-10-13 PROCEDURE — 74011250637 HC RX REV CODE- 250/637: Performed by: NURSE PRACTITIONER

## 2022-10-13 PROCEDURE — 97535 SELF CARE MNGMENT TRAINING: CPT

## 2022-10-13 PROCEDURE — 83735 ASSAY OF MAGNESIUM: CPT

## 2022-10-13 PROCEDURE — 80053 COMPREHEN METABOLIC PANEL: CPT

## 2022-10-13 PROCEDURE — 74011250637 HC RX REV CODE- 250/637: Performed by: EMERGENCY MEDICINE

## 2022-10-13 PROCEDURE — 36415 COLL VENOUS BLD VENIPUNCTURE: CPT

## 2022-10-13 PROCEDURE — 85025 COMPLETE CBC W/AUTO DIFF WBC: CPT

## 2022-10-13 PROCEDURE — 86140 C-REACTIVE PROTEIN: CPT

## 2022-10-13 PROCEDURE — 83880 ASSAY OF NATRIURETIC PEPTIDE: CPT

## 2022-10-13 PROCEDURE — 74011250637 HC RX REV CODE- 250/637: Performed by: FAMILY MEDICINE

## 2022-10-13 PROCEDURE — 97116 GAIT TRAINING THERAPY: CPT

## 2022-10-13 RX ORDER — AMOXICILLIN 250 MG
1 CAPSULE ORAL
Qty: 60 TABLET | Refills: 0 | Status: SHIPPED | OUTPATIENT
Start: 2022-10-13

## 2022-10-13 RX ORDER — POLYETHYLENE GLYCOL 3350 17 G/17G
17 POWDER, FOR SOLUTION ORAL
Qty: 30 EACH | Refills: 0 | Status: SHIPPED | OUTPATIENT
Start: 2022-10-13

## 2022-10-13 RX ORDER — LANOLIN ALCOHOL/MO/W.PET/CERES
400 CREAM (GRAM) TOPICAL DAILY
Qty: 60 TABLET | Refills: 0 | Status: SHIPPED | OUTPATIENT
Start: 2022-10-14

## 2022-10-13 RX ORDER — AMOXICILLIN 250 MG
1 CAPSULE ORAL 2 TIMES DAILY
Qty: 60 TABLET | Refills: 0 | Status: SHIPPED | OUTPATIENT
Start: 2022-10-13 | End: 2022-10-13 | Stop reason: SDUPTHER

## 2022-10-13 RX ORDER — POLYETHYLENE GLYCOL 3350 17 G/17G
17 POWDER, FOR SOLUTION ORAL DAILY
Qty: 30 EACH | Refills: 0 | Status: SHIPPED | OUTPATIENT
Start: 2022-10-14 | End: 2022-10-13 | Stop reason: SDUPTHER

## 2022-10-13 RX ORDER — MIDODRINE HYDROCHLORIDE 10 MG/1
10 TABLET ORAL EVERY 8 HOURS
Qty: 90 TABLET | Refills: 0 | Status: SHIPPED | OUTPATIENT
Start: 2022-10-13 | End: 2022-11-12

## 2022-10-13 RX ADMIN — THERA TABS 1 TABLET: TAB at 08:48

## 2022-10-13 RX ADMIN — MAGNESIUM OXIDE 400 MG (241.3 MG MAGNESIUM) TABLET 400 MG: TABLET at 08:48

## 2022-10-13 RX ADMIN — MIDODRINE HYDROCHLORIDE 10 MG: 5 TABLET ORAL at 06:20

## 2022-10-13 RX ADMIN — SODIUM CHLORIDE, PRESERVATIVE FREE 10 ML: 5 INJECTION INTRAVENOUS at 08:49

## 2022-10-13 RX ADMIN — MIDODRINE HYDROCHLORIDE 10 MG: 5 TABLET ORAL at 14:14

## 2022-10-13 NOTE — PROGRESS NOTES
Patient seen and evaluated in patient by Minesh Gerber Sleep Navigator in collaboration with the Heart Failure Nurse Navigator due to high suspicion of sleep disordered breathing. In lab sleep study ordered for evaluation.      Orders Placed This Encounter    [phone] POLYSOMNOGRAPHY (1st NIGHT STUDY) SPLIT IF MEETS CRITERIA     Standing Status:   Future     Standing Expiration Date:   10/13/2023     Scheduling Instructions:      Route to Dr. Jackie Garcia Specific Question:   Reason for Exam     Answer:   lexii       HUEY Rodriguez, [facility]   Nurse Practitioner  [address] [place] [address]

## 2022-10-13 NOTE — PROGRESS NOTES
[address] [facility]  Hospitalist Group                                                                                          Hospitalist Progress Note  Zackery Peguero MD  Answering service: [phone] from in house phone        Date of Service:  10/13/2022  NAME:  Grace Hickey  :  850  MRN:  [id number]      Admission Summary:     A 65 F with Alzheimer disease, HTN, presenting after witnessed cardiac arrest at home.  immediately started CPR, EMS then found Vifb with Shock x1, followed by asystole. ROSC < 5minutes reported. EMS transported to [facility] , [place] with pulse for 30 seconds started on amio gtt. CTH negative. Moved to [facility] CCU for further management. Patient is post heart cath and AICD placement and off inotropes and transferred to the hospitalist team in the early AM hours of 10/7/22 as an ICU downgrade     Interval history / Subjective:     Patient conscious and alert, pulled out her IV line with minimal blood and stopped with pressure, she answered questions but with short memory problems. She said she feels the same, no chest pain, no shortness of breath, cough or dizziness. No family in the room. She said she lives with her  and she said he will pick her up when she goes home. Assessment & Plan:     Cardiac arrest prior to admission due to V Fib with CHF based on workup this admission  -s/p ICD   -ventricular rate 69-84 bpm over night  -no left side chest pain, palpitation or shortness of breath  -Cardiology on board    Acute HFrEF,  EF 10-15%, Stage C, NYHA Class III A  -CHF- new diagnosis of dilated cardiomyopathy,  -off dobutamine - hypotensive post cath and BP now low stable on midodrine Q 8  -s/p Heart cath on 10/6/22 with following results= Mild one vessel CAD, Rt dominant   Severe LV systolic dysfunction, EF 15 to 20%;  Elevated LVEDP; Mild pulmonary HTN  10/6/22 - ICD implant for Cardiomyopathy LVEF =10 %  & Secondary prevention post cardiac arrest for V fib  -Currently Bumex on hold and she is not on rate control meds  -had episodes of symptomatic hypotension after getting her morning spironolactone - she required a 250 bolus on NS on 10/12- spironolactone was put on hold   -advanced heart failure on board     Acute respiratory failure  -resolved and stable on RA  -SpO2 99% on RA    Orthostatic hypotension   -BP normal, repeat orthostatic BP   -spironolactone and other BP meds on hold  -on midodrine 10 mg q 8 hrs  -repeat Orthostatic BP this morning    Anemia of chronic disease  -Hgb 9.8 trending down  -no evidence of active bleeding  -monitor H/H     Creatinine was mildly elevated post procedures and heart cath   -now normal after holding Bumex diuretics, remained on low-dose spironolactone until this am     Thrombocytopenia  -Impressive drop in plt count attributed to heparin drip d/madiha 10/4, plt count improving, HIT negative  -  -monitor platelet count     UTI POA  -UA positive on admission, completed 3 day course of IV ceftriaxone    Hx of dementia  - no evidence of ICU delerium     Hypomagnesemia   -replaced and improved  -continue magnesium oxide 400 mg daily    Left sided rib cage pain suspected costochrondritis from CPR  -continue prn norco     OT/PT eval and CM consulted    Home today if her orthostatic BP improved      Full Code: high risk for readmission, palliative care team on board             Code status: Full Code   Prophylaxis: SCD  Care Plan discussed with: Patient and Nurse  Anticipated Disposition: Home with Home health in 24-48 hrs      Hospital Problems  Never Reviewed            Codes Class Noted POA    Acute systolic heart failure ([facility] [address] ICD-10-CM: I50.21  ICD-9-CM: 428.21  10/4/2022 Unknown        Cardiac arrest [facility]) ICD-10-CM: I46.9  ICD-9-CM: 427.5  10/1/2022 Unknown           Vital Signs:    Last 24hrs VS reviewed since prior progress note.  Most recent are:  Visit Vitals  /64 (BP 1 Location: Left arm, BP Patient Position: At rest)   Pulse 68   Temp 97.9 °F (36.6 °C)   Resp 18   Ht 5' 2\" (1.575 m)   Wt 47.8 kg (105 lb 6.1 oz)   SpO2 99%   BMI 19.27 kg/m²         Intake/Output Summary (Last 24 hours) at 10/13/2022 0844  Last data filed at 10/13/2022 0730  Gross per 24 hour   Intake 590 ml   Output 1425 ml   Net -835 ml        Physical Examination:     I had a face to face encounter with this patient and independently examined them on 10/13/2022 as outlined below:          Constitutional:  No acute distress, cooperative, pleasant    ENT:  Oral mucosa moist, oropharynx benign. Resp:  CTA bilaterally. No wheezing/rhonchi/rales. No accessory muscle use. CV:  Regular rhythm, normal rate, no murmurs, gallops, rubs    GI:  Soft, non distended, non tender. normoactive bowel sounds, no hepatosplenomegaly     Musculoskeletal:  No edema     Neurologic:  Conscious and alert, oriented to place and person, time, moves all extremities. CN II-XII reviewed            Data Review:    Review and/or order of clinical lab test  Review and/or order of tests in the radiology section of CPT  Review and/or order of tests in the medicine section of CPT      Labs:     Recent Labs     10/13/22  0607 10/12/22  0530   WBC 4.2 3.9   HGB 9.8* 10.3*   HCT 31.6* 33.1*   * 154     Recent Labs     10/13/22  0607 10/12/22  0530 10/11/22  0414    134* 135*   K 4.5 4.4 4.0    106 104   CO2 24 26 26   BUN 12 11 10   CREA 0.69 0.79 0.81   GLU 84 89 91   CA 9.1 9.5 9.3   MG 1.8 1.6 1.8   PHOS 3.0 3.0 3.0     Recent Labs     10/13/22  0607 10/12/22  0530 10/11/22  0414   ALT 28 33 32    105 91   TBILI 0.4 0.6 0.5   TP 6.0* 6.2* 5.6*   ALB 2.7* 3.0* 2.8*   GLOB 3.3 3.2 2.8     Recent Labs     10/13/22  0607 10/12/22  0530 10/11/22  0414   INR 1.0 1.0 1.0   PTP 10.6 10.7 10.7      No results for input(s): FE, TIBC, PSAT, FERR in the last 72 hours.    No results found for: FOL, RBCF   No results for input(s): PH, PCO2, PO2 in the last 72 hours. No results for input(s): CPK, CKNDX, TROIQ in the last 72 hours.     No lab exists for component: CPKMB  No results found for: CHOL, CHOLX, CHLST, CHOLV, HDL, HDLP, LDL, LDLC, DLDLP, TGLX, TRIGL, TRIGP, CHHD, CHHDX  Lab Results   Component Value Date/Time    Glucose (POC) 126 (H) 10/03/2022 02:10 PM    Glucose (POC) 143 (H) 10/03/2022 04:20 AM    Glucose (POC) 192 (H) 10/02/2022 11:56 PM    Glucose (POC) 191 (H) 10/02/2022 06:36 PM    Glucose,  (H) 10/01/2022 09:18 PM     Lab Results   Component Value Date/Time    Color YELLOW/STRAW 10/10/2022 03:36 PM    Appearance CLEAR 10/10/2022 03:36 PM    Specific gravity 1.006 10/10/2022 03:36 PM    Specific gravity 1.020 10/01/2022 09:56 PM    pH (UA) 7.0 10/10/2022 03:36 PM    Protein Negative 10/10/2022 03:36 PM    Glucose Negative 10/10/2022 03:36 PM    Ketone Negative 10/10/2022 03:36 PM    Bilirubin Negative 10/10/2022 03:36 PM    Urobilinogen 0.2 10/10/2022 03:36 PM    Nitrites Negative 10/10/2022 03:36 PM    Leukocyte Esterase Negative 10/10/2022 03:36 PM    Epithelial cells MODERATE (A) 10/10/2022 03:36 PM    Bacteria Negative 10/10/2022 03:36 PM    WBC 0-4 10/10/2022 03:36 PM    RBC 0-5 10/10/2022 03:36 PM         Medications Reviewed:     Current Facility-Administered Medications   Medication Dose Route Frequency    traMADoL (ULTRAM) tablet 50 mg  50 mg Oral Q6H PRN    HYDROcodone-acetaminophen (NORCO) 5-325 mg per tablet 1 Tablet  1 Tablet Oral Q4H PRN    midodrine (PROAMATINE) tablet 10 mg  10 mg Oral Q8H    magnesium oxide (MAG-OX) tablet 400 mg  400 mg Oral DAILY    sodium chloride (NS) flush 5-40 mL  5-40 mL IntraVENous PRN    lidocaine (XYLOCAINE) 4 % cream   Topical PRN    therapeutic multivitamin (THERAGRAN) tablet 1 Tablet  1 Tablet Oral DAILY    ergocalciferol capsule 50,000 Units  50,000 Units Oral Q7D    guaiFENesin (ROBITUSSIN) 100 mg/5 mL oral liquid 100 mg  100 mg Oral Q4H PRN    alteplase (CATHFLO) 1 mg in sterile water (preservative free) 1 mL injection  1 mg InterCATHeter PRN    sodium chloride (NS) flush 5-40 mL  5-40 mL IntraVENous Q8H    sodium chloride (NS) flush 5-40 mL  5-40 mL IntraVENous PRN    acetaminophen (TYLENOL) tablet 650 mg  650 mg Oral Q6H PRN    Or    acetaminophen (TYLENOL) suppository 650 mg  650 mg Rectal Q6H PRN    polyethylene glycol (MIRALAX) packet 17 g  17 g Oral DAILY    senna-docusate (PERICOLACE) 8.6-50 mg per tablet 1 Tablet  1 Tablet Oral BID    magnesium hydroxide (MILK OF MAGNESIA) 400 mg/5 mL oral suspension 30 mL  30 mL Oral DAILY PRN    sodium phosphate (FLEET'S) enema 1 Enema  1 Enema Rectal DAILY PRN    ondansetron (ZOFRAN) injection 4 mg  4 mg IntraVENous Q6H PRN     ______________________________________________________________________  EXPECTED LENGTH OF STAY: 6d 2h  ACTUAL LENGTH OF STAY:          11                 Karen Gutierrez MD

## 2022-10-13 NOTE — ACP (ADVANCE CARE PLANNING)
Advance Care Planning     Advance Care Planning (ACP) Physician/NP/PA Conversation      Date of Conversation: 10/2/2022  Conducted with: Patient with Decision Making Capacity and her  and daughter    Healthcare Decision Maker: If patient is unable to make her own decisions- her  will be the decision maker  No healthcare decision makers have been documented. Click here to complete [address] including selection of the Healthcare Decision Maker Relationship (ie \"Primary\")  Today we documented Decision Maker(s) consistent with Legal Next of Kin hierarchy. She does not have any formal advanced directives    Care Preferences:    Hospitalization: \"If your health worsens and it becomes clear that your chance of recovery is unlikely, what would be your preference regarding hospitalization? \"  The patient would prefer hospitalization. Ventilation: \"If you were unable to breathe on your own and your chance of recovery was unlikely, what would be your preference about the use of a ventilator (breathing machine) if it was available to you? \"   The patient would desire the use of a ventilator. Resuscitation: \"In the event your heart stopped as a result of an underlying serious health condition, would you want attempts to be made to restart your heart, or would you prefer a natural death? \"   Yes, attempt to resuscitate.     Additional topics discussed: treatment goals, benefit/burden of treatment options, hospitalization preferences, and we discussed what a DNR code status means    Conversation Outcomes / Follow-Up Plan:   ACP incomplete - refer to ACP Clinical Specialist  Reviewed DNR/DNI and patient elects Full Code (Attempt Resuscitation)     Length of Voluntary ACP Conversation in minutes:  20 minutes    Jacki Rebolledo MD

## 2022-10-13 NOTE — PROGRESS NOTES
JOHN: Discharge home with rolling walker and HH PT/OT through [facility]. Transportation in car with /daughter. RUR: 12%     Emergency contact: David Rizo, spouse, [phone]     Disposition: Patient admitted 10/2 for cardiac arrest. Patient is from home where she lives with her .  is primary caregiver. Patient does have a history of dementia. Patient had a left and right heart cath and placement of AICD last week. Rolling walker delivered today. CM met with family and provided information for in-home primary care services in the [place] area. [facility] recommended patient discharging home on an inotrope drip, but family feels this will be unsuccessful as patient will pull on her lines. No additional needs at this time.     Constance Diaz, [facility] [facility],[address]  [phone]

## 2022-10-13 NOTE — DISCHARGE SUMMARY
Discharge Summary       PATIENT ID: Moo Malhotra  MRN: [id number]   YOB: 1945    DATE OF ADMISSION: 10/2/2022  2:33 AM    DATE OF DISCHARGE:  10/13/2022  PRIMARY CARE PROVIDER: Cecilia Garcia MD     ATTENDING PHYSICIAN: Aaron Stewart MD   DISCHARGING PROVIDER: Coy Huddleston MD    To contact this individual call [phone] and ask the  to page. If unavailable ask to be transferred the Adult Hospitalist Department. CONSULTATIONS: IP CONSULT TO CARDIOLOGY  IP CONSULT TO ADVANCED HEART FAILURE  IP CONSULT TO ELECTROPHYSIOLOGY  IP CONSULT TO PALLIATIVE CARE - PROVIDER    PROCEDURES/SURGERIES: Procedure(s):  INSERT ICD SINGLE    ADMISSION SUMMARY AND HOSPITAL COURSE:     A 65 F with Alzheimer disease, HTN, presenting after witnessed cardiac arrest at home.  immediately started CPR, EMS then found Vifb with Shock x1, followed by asystole. ROSC < 5minutes reported. EMS transported to [facility] , [place] with pulse for 30 seconds started on amio gtt. CTH negative. Moved to [facility] CCU for further management. Patient is post heart cath and AICD placement and off inotropes and transferred to the hospitalist team in the early AM hours of 10/7/22 as an ICU downgrade     Cardiac arrest prior to admission due to V Fib with CHF based on workup this admission  -s/p ICD   -ventricular rate 69-84 bpm over night  -no left side chest pain, palpitation or shortness of breath  -Cardiology on board  Acute HFrEF,  EF 10-15%, Stage C, NYHA Class III A  -CHF- new diagnosis of dilated cardiomyopathy,  -off dobutamine - hypotensive post cath and BP now low stable on midodrine Q 8  -s/p Heart cath on 10/6/22 with following results= Mild one vessel CAD, Rt dominant   Severe LV systolic dysfunction, EF 15 to 20%;  Elevated LVEDP; Mild pulmonary HTN  10/6/22 - ICD implant for Cardiomyopathy LVEF =10 %  & Secondary prevention post cardiac arrest for V fib  -Currently Bumex on hold and she is not on rate control meds  -had episodes of symptomatic hypotension after getting her morning spironolactone - she required a 250 bolus on NS on 10/12- spironolactone was put on hold   - and daughter declined inotrope gtt, they want to see her how she does and hoping her EF recovering.  And they want to take her home and outpatient follow up with cardiology and advanced heart failure team.   -advanced heart failure on board  -Palliative care on board  Acute respiratory failure  -resolved and stable on RA  -SpO2 99% on RA  Orthostatic hypotension   -BP normal, repeated orthostatic BP stable  -spironolactone and other BP meds on hold  -continue on midodrine 10 mg q 8 hrs  Anemia of chronic disease  -Hgb 9.8 trending down  -no evidence of active bleeding  -monitor H/H  Creatinine was mildly elevated post procedures and heart cath   -now normal after holding Bumex diuretics, discontinued spironolactone until this am  Thrombocytopenia  -Impressive drop in plt count attributed to heparin drip d/madiha 10/4, plt count improving, HIT negative  -  -monitor platelet count   UTI POA  -UA positive on admission, completed 3 day course of IV ceftriaxone  Hx of dementia  -stable, continue supportive care  Hypomagnesemia   -replaced and improved  -continue magnesium oxide 400 mg daily  Left sided rib cage pain suspected costochrondritis from CPR  -continue prn norco     OT/PT eval and CM on board     Home with home health care service         Full Code: high risk for readmission, palliative care team on board       Code status: Full Code   Prophylaxis: SCD    DISCHARGE DIAGNOSES / PLAN:      Cardiac arrest prior to admission due to V Fib with CHF based on workup this admission  -s/p ICD   -outpatient follow up with Cardiology    Acute HFrEF,  EF 10-15%, Stage C, NYHA Class III A  -CHF- new diagnosis of dilated cardiomyopathy,  -couldn't tolerate BB,ACEi/ARB due to orthostatic hypotension  -outpatient follow up with advanced heart failure    Acute respiratory failure  -resolved   Orthostatic hypotension   -BP normal,   -continue on midodrine 10 mg q 8 hrs  Anemia of chronic disease  -Hgb 9.8   -outpatient monitoring   Creatinine was mildly elevated post procedures and heart cath   -now normal    Thrombocytopenia  -improving  -  -monitor platelet count   UTI POA  -completed 3 day course of IV ceftriaxone  Hx of dementia  -continue supportive care  Hypomagnesemia   -resolved  -continue magnesium oxide 400 mg daily  Left sided rib cage pain suspected costochrondritis from CPR  -continue prn norco    NOTIFY YOUR PHYSICIAN FOR ANY OF THE FOLLOWING:   Fever over 101 degrees for 24 hours. Chest pain, shortness of breath, fever, chills, nausea, vomiting, diarrhea, change in mentation, falling, weakness, bleeding. Severe pain or pain not relieved by medications, as well as any other signs or symptoms that you may have questions about. FOLLOW UP APPOINTMENTS:    Follow-up Information       Follow up With Specialties Details Why Contact Info    [facility] Follow up As needed for home health services [address]    [facility] Follow up on 10/18/2022 1030am [address] [place] [address], [address]    Marnie Grajeda MD Family Medicine   [address] [place] [address]  [address]  [address] [place] [address]  [phone]                 DIET: Cardiac Diet    ACTIVITY: Activity as tolerated, Fall precautions     EQUIPMENT needed: None    DISCHARGE MEDICATIONS:  Current Discharge Medication List        START taking these medications    Details   midodrine (PROAMATINE) 10 mg tablet Take 1 Tablet by mouth every eight (8) hours for 30 days. Qty: 90 Tablet, Refills: 0  Start date: 10/13/2022, End date: 11/12/2022      magnesium oxide (MAG-OX) 400 mg tablet Take 1 Tablet by mouth daily.   Qty: 60 Tablet, Refills: 0  Start date: 10/14/2022      senna-docusate (PERICOLACE) 8.6-50 mg per tablet Take 1 Tablet by mouth two (2) times daily as needed for Constipation. Qty: 60 Tablet, Refills: 0  Start date: 10/13/2022      polyethylene glycol (MIRALAX) 17 gram packet Take 1 Packet by mouth daily as needed for Constipation. Qty: 30 Each, Refills: 0  Start date: 10/13/2022           CONTINUE these medications which have NOT CHANGED    Details   Cholestyramine-Aspartame 4 gram powder Take 4 g by mouth three (3) times daily (with meals). memantine (NAMENDA) 10 mg tablet Take 10 mg by mouth two (2) times a day. alendronate (FOSAMAX) 70 mg tablet Take 70 mg by mouth every seven (7) days. hyoscyamine SR (LEVBID) 0.375 mg SR tablet Take 0.375 mg by mouth two (2) times a day. cholecalciferol, VITAMIN D3, (VITAMIN D3) 5,000 unit tab tablet Take  by mouth daily. multivitamin with minerals (ONE-A-DAY 50 PLUS PO) Take 1 Tab by mouth daily.            STOP taking these medications       sacubitriL-valsartan (Entresto) 24-26 mg tablet Comments:   Reason for Stopping:         furosemide (Lasix) 20 mg tablet Comments:   Reason for Stopping:         carvediloL (Coreg) 3.125 mg tablet Comments:   Reason for Stopping:         ibuprofen (MOTRIN) 600 mg tablet Comments:   Reason for Stopping:         diclofenac EC (VOLTAREN) 75 mg EC tablet Comments:   Reason for Stopping:         losartan (COZAAR) 25 mg tablet Comments:   Reason for Stopping:         metoprolol succinate (TOPROL-XL) 25 mg XL tablet Comments:   Reason for Stopping:         potassium chloride (KLOR-CON M20 PO) Comments:   Reason for Stopping:         hydroCHLOROthiazide (HYDRODIURIL) 25 mg tablet Comments:   Reason for Stopping:         valacyclovir HCl (VALACYCLOVIR PO) Comments:   Reason for Stopping:         rifAXIMin (XIFAXAN) 550 mg tablet Comments:   Reason for Stopping:         dicyclomine (BENTYL) 10 mg capsule Comments:   Reason for Stopping: raNITIdine hcl 150 mg capsule Comments:   Reason for Stopping:         calcium polycarbophil (FIBERCON) 625 mg tablet Comments:   Reason for Stopping:         melatonin 3 mg tablet Comments:   Reason for Stopping:               DISPOSITION:   x Home With:   OT  PT x HH  RN       Long term SNF/Inpatient Rehab    Independent/assisted living    Hospice    Other:       PATIENT CONDITION AT DISCHARGE:     Functional status    Poor    x Deconditioned     Independent      Cognition     Lucid     Forgetful    x Dementia      Catheters/lines (plus indication)    Kruse     PICC     PEG    x None      Code status   x  Full code     DNR      PHYSICAL EXAMINATION AT DISCHARGE:    General:          Alert, cooperative, no distress, appears stated age. HEENT:           Atraumatic, anicteric sclerae, pink conjunctivae                          No oral ulcers, mucosa moist, throat clear, dentition fair  Neck:               Supple, symmetrical  Lungs:             Decreased bronchial breath sound to auscultation bilaterally. No Wheezing or Rhonchi. No rales. Chest wall:      No tenderness  No Accessory muscle use. Heart:              Regular  rhythm,  No  murmur   No edema  Abdomen:        Soft, non-tender. Not distended. Bowel sounds normal  Extremities:     No cyanosis. No clubbing,                            Skin turgor normal, Capillary refill normal  Skin:                Not pale. Not Jaundiced  No rashes   Psych:             Not anxious or agitated.   Neurologic:      Alert, moves all extremities, answers some questions appropriately and responds to commands       CHRONIC MEDICAL DIAGNOSES:  Problem List as of 10/13/2022 Never Reviewed            Codes Class Noted - Resolved    Acute systolic heart failure ([facility] [address] ICD-10-CM: I50.21  ICD-9-CM: 428.21  10/4/2022 - Present        Cardiac arrest [facility]) ICD-10-CM: I46.9  ICD-9-CM: 427.5  10/1/2022 - Present           Greater than 45 minutes were spent with the patient on counseling and coordination of care    Signed:   Estela Mireles MD  10/13/2022  3:57 PM

## 2022-10-13 NOTE — PROGRESS NOTES
Occupational Therapy  10/13/2022      Notified by RN that Dr. Pierre Dodge wanted therapy to review fall prevention with family prior to discharge home today. Met with pt's daughter and  regarding home safety, fall prevention and activity parameters for pt at home. Issued multiple handouts for reference. Also gave family cloth gait belt, and reviewed donning gait belt. Educated  and dtr how to perform assisted falls/getting  pt safely up from a fall. Pt is scheduled to have [facility] and PT and confirmed with CM. [facility] so [facility] therapists can review acute care therapists' notes for continuity of care.      Total time 15 minutes

## 2022-10-13 NOTE — CONSULTS
Palliative Medicine:    Consult received, discussed w/ Dr Buck Mathur- pt w/ CHF w/ EF 10-15%, cardiac arrest prior to admission, orthostatic hypotension and mult other medical conditions. At risk for decline. Pt is making gains w/ therapies. AHF team following closely and has educated pt and family about clinical situation and that heart function may improve or she may decline and then would consider goals of care and hospice.      If pt remains in the hospital, our team to see tmrw AM.

## 2022-10-13 NOTE — PROGRESS NOTES
Cardiology Progress Note                                        Admit Date: 10/2/2022    Assessment/Plan:     S/p cardiac arrest; now s/p ICD, no issues  CHF; stable; RHC with elevated LVEDP and PASP  Thrombocytopenia; stable   Cardiomyopathy; severe; as per F    Andres Magana is a 65 y.o. female with     PROBLEM LIST:  Patient Active Problem List    Diagnosis Date Noted    Acute systolic heart failure ([facility] [address] 10/04/2022    Cardiac arrest ([facility] [address] 10/01/2022         Subjective:     Andres Magana reports none. Visit Vitals  /62   Pulse 78   Temp 97.8 °F (36.6 °C)   Resp 24   Ht 5' 2\" (1.575 m)   Wt 47.8 kg (105 lb 6.1 oz)   SpO2 99%   BMI 19.27 kg/m²       Intake/Output Summary (Last 24 hours) at 10/13/2022 1401  Last data filed at 10/13/2022 0730  Gross per 24 hour   Intake --   Output 1125 ml   Net -1125 ml         Objective:      Physical Exam:  HEENT: Perrla, EOMI  Neck: No JVD,  No thyroidmegaly  Resp: CTA bilaterally;  No wheezes or rales  CV: RRR s1s2 No murmur, + s3  Abd:Soft, Nontender  Ext: No edema  Neuro: Alert and oriented; Nonfocal  Skin: Warm, Dry, Intact  Pulses: 2+ DP/PT/Rad      Telemetry: normal sinus rhythm    Current Facility-Administered Medications   Medication Dose Route Frequency    traMADoL (ULTRAM) tablet 50 mg  50 mg Oral Q6H PRN    HYDROcodone-acetaminophen (NORCO) 5-325 mg per tablet 1 Tablet  1 Tablet Oral Q4H PRN    midodrine (PROAMATINE) tablet 10 mg  10 mg Oral Q8H    magnesium oxide (MAG-OX) tablet 400 mg  400 mg Oral DAILY    sodium chloride (NS) flush 5-40 mL  5-40 mL IntraVENous PRN    lidocaine (XYLOCAINE) 4 % cream   Topical PRN    therapeutic multivitamin (THERAGRAN) tablet 1 Tablet  1 Tablet Oral DAILY    ergocalciferol capsule 50,000 Units  50,000 Units Oral Q7D    guaiFENesin (ROBITUSSIN) 100 mg/5 mL oral liquid 100 mg  100 mg Oral Q4H PRN    alteplase (CATHFLO) 1 mg in sterile water (preservative free) 1 mL injection  1 mg InterCATHeter PRN    sodium chloride (NS) flush 5-40 mL  5-40 mL IntraVENous Q8H    sodium chloride (NS) flush 5-40 mL  5-40 mL IntraVENous PRN    acetaminophen (TYLENOL) tablet 650 mg  650 mg Oral Q6H PRN    Or    acetaminophen (TYLENOL) suppository 650 mg  650 mg Rectal Q6H PRN    polyethylene glycol (MIRALAX) packet 17 g  17 g Oral DAILY    senna-docusate (PERICOLACE) 8.6-50 mg per tablet 1 Tablet  1 Tablet Oral BID    magnesium hydroxide (MILK OF MAGNESIA) 400 mg/5 mL oral suspension 30 mL  30 mL Oral DAILY PRN    sodium phosphate (FLEET'S) enema 1 Enema  1 Enema Rectal DAILY PRN    ondansetron (ZOFRAN) injection 4 mg  4 mg IntraVENous Q6H PRN         Data Review:   Labs:    Recent Results (from the past 24 hour(s))   METABOLIC PANEL, COMPREHENSIVE    Collection Time: 10/13/22  6:07 AM   Result Value Ref Range    Sodium 136 136 - 145 mmol/L    Potassium 4.5 3.5 - 5.1 mmol/L    Chloride 105 97 - 108 mmol/L    CO2 24 21 - 32 mmol/L    Anion gap 7 5 - 15 mmol/L    Glucose 84 65 - 100 mg/dL    BUN 12 6 - 20 MG/DL    Creatinine 0.69 0.55 - 1.02 MG/DL    BUN/Creatinine ratio 17 12 - 20      eGFR >60 >60 ml/min/1.73m2    Calcium 9.1 8.5 - 10.1 MG/DL    Bilirubin, total 0.4 0.2 - 1.0 MG/DL    ALT (SGPT) 28 12 - 78 U/L    AST (SGOT) 51 (H) 15 - 37 U/L    Alk.  phosphatase 102 45 - 117 U/L    Protein, total 6.0 (L) 6.4 - 8.2 g/dL    Albumin 2.7 (L) 3.5 - 5.0 g/dL    Globulin 3.3 2.0 - 4.0 g/dL    A-G Ratio 0.8 (L) 1.1 - 2.2     MAGNESIUM    Collection Time: 10/13/22  6:07 AM   Result Value Ref Range    Magnesium 1.8 1.6 - 2.4 mg/dL   PHOSPHORUS    Collection Time: 10/13/22  6:07 AM   Result Value Ref Range    Phosphorus 3.0 2.6 - 4.7 MG/DL   CBC WITH AUTOMATED DIFF    Collection Time: 10/13/22  6:07 AM   Result Value Ref Range    WBC 4.2 3.6 - 11.0 K/uL    RBC 4.03 3.80 - 5.20 M/uL    HGB 9.8 (L) 11.5 - 16.0 g/dL    HCT 31.6 (L) 35.0 - 47.0 %    MCV 78.4 (L) 80.0 - 99.0 FL    MCH 24.3 (L) 26.0 - 34.0 PG    MCHC 31.0 30.0 - 36.5 g/dL    RDW 13.9 11.5 - 14.5 % PLATELET 254 (L) 155 - 400 K/uL    MPV 10.7 8.9 - 12.9 FL    NRBC 0.0 0  WBC    ABSOLUTE NRBC 0.00 0.00 - 0.01 K/uL    NEUTROPHILS 58 32 - 75 %    LYMPHOCYTES 23 12 - 49 %    MONOCYTES 14 (H) 5 - 13 %    EOSINOPHILS 4 0 - 7 %    BASOPHILS 1 0 - 1 %    IMMATURE GRANULOCYTES 0 0.0 - 0.5 %    ABS. NEUTROPHILS 2.5 1.8 - 8.0 K/UL    ABS. LYMPHOCYTES 1.0 0.8 - 3.5 K/UL    ABS. MONOCYTES 0.6 0.0 - 1.0 K/UL    ABS. EOSINOPHILS 0.2 0.0 - 0.4 K/UL    ABS. BASOPHILS 0.0 0.0 - 0.1 K/UL    ABS. IMM.  GRANS. 0.0 0.00 - 0.04 K/UL    DF AUTOMATED     PROTHROMBIN TIME + INR    Collection Time: 10/13/22  6:07 AM   Result Value Ref Range    INR 1.0 0.9 - 1.1      Prothrombin time 10.6 9.0 - 11.1 sec   NT-PRO BNP    Collection Time: 10/13/22  6:07 AM   Result Value Ref Range    NT pro-BNP 11,104 (H) <450 PG/ML   C REACTIVE PROTEIN, QT    Collection Time: 10/13/22  6:07 AM   Result Value Ref Range    C-Reactive protein <0.29 0.00 - 0.60 mg/dL

## 2022-10-13 NOTE — PROGRESS NOTES
Problem: Self Care Deficits Care Plan (Adult)  Goal: *Acute Goals and Plan of Care (Insert Text)  Description:   FUNCTIONAL STATUS PRIOR TO ADMISSION: Patient was independent and active without use of DME. Completed IADL independently with transportation assistance from family/. Baseline A&Ox3-4, occasional memory deficits. HOME SUPPORT: Lived with  in 2 story home, has supportive family nearby who assist with IADL. Occupational Therapy Goals  Initiated 10/4/2022, re-evaluation post ICD 10/7/22  1. Patient will perform grooming with supervision/set-up within 7 day(s). 2.  Patient will perform upper body dressing while adhering to ICD precautions with supervision/set-up within 7 day(s). 3.  Patient will perform lower body dressing with supervision/set-up within 7 day(s). 4.  Patient will perform all aspects of toileting with adhering to ICD precautions minimal assistance/contact guard assist within 7 day(s). 5.  Patient will utilize energy conservation techniques during functional activities with verbal cues within 7 day(s). Outcome: Progressing Towards Goal       OCCUPATIONAL THERAPY TREATMENT  Patient: Collette Moose (12 y.o. female)  Date: 10/13/2022  Diagnosis: Cardiac arrest ([facility] [address] [I46.9] <principal problem not specified>  Procedure(s) (LRB):  INSERT ICD SINGLE (N/A) 7 Days Post-Op  Precautions: Other (comment), Fall (PPM precautions)  Chart, occupational therapy assessment, plan of care, and goals were reviewed. ASSESSMENT  Patient continues with skilled OT services and is slowly progressing towards goals. Pt with no recall of L UE PPM precautions and requires constant verbal cues during transfers and ADLs. Pt completed sit to stand with SBA and verbal cues for PPM adherence. Pt completed toilet transfers with RW with min verbal cues for RW management, especially in narrow areas.  At end of session, BP machine not functioning despite multiple attempts to trouble shoot with RN. Pt denied dizziness. At this time, pt is appropriate to discharge home with family support, supervision and HHOT/PT services. Of note, issued pt therapy's fall prevention handouts as well as a gait belt for home safety and fall prevention. Current Level of Function Impacting Discharge (ADLs): SBA, poor short term memory, poor adherence to PPM precautions, hypotension previous days, up to min A UB dressing    Other factors to consider for discharge: Alzheimer's         PLAN :  Patient continues to benefit from skilled intervention to address the above impairments. Continue treatment per established plan of care to address goals. Recommend with staff: BSC, RW    Recommend next OT session: ADLs, L UE PPM    Recommendation for discharge: (in order for the patient to meet his/her long term goals)  Occupational therapy at least 2 days/week in the home     This discharge recommendation:  Has been made in collaboration with the attending provider and/or case management    IF patient discharges home will need the following DME: none        SUBJECTIVE:   Patient stated Danial Bolden got a pacemaker?     OBJECTIVE DATA SUMMARY:   Cognitive/Behavioral Status:  Neurologic State: Alert  Orientation Level: Oriented to person;Oriented to place;Oriented to time;Disoriented to situation  Cognition: Follows commands; Impulsive; Impaired decision making  Perception: Appears intact  Perseveration: No perseveration noted  Safety/Judgement: Awareness of environment    Functional Mobility and Transfers for ADLs:  Bed Mobility:  Supine to Sit: Other (comment) (NT, started and ended up in chair)  Sit to Supine: Other (comment) (NT, started and ended up in chair)    Transfers:  Sit to Stand: Stand-by assistance  Functional Transfers  Toilet Transfer : Contact guard assistance  Bed to Chair: Contact guard assistance; Additional time    Balance:  Sitting: Intact; Without support  Standing: Intact; With support  Standing - Static: Good;Constant support  Standing - Dynamic : Good;Constant support    ADL Intervention:       Grooming  Washing Hands: Supervision- standing at sink, cues to square RW off to sink surface                              Toileting  Bladder Hygiene: Stand-by assistance    Cognitive Retraining  Safety/Judgement: Awareness of environment      Pain:  Mild chest/sternal pain, likely from compression    Activity Tolerance:   Good    After treatment patient left in no apparent distress:   Sitting in chair, Call bell within reach, and Bed / chair alarm activated    COMMUNICATION/COLLABORATION:   The patients plan of care was discussed with: Physical therapist and Registered nurse.      Albino Madden, OT  Time Calculation: 19 mins

## 2022-10-13 NOTE — PROGRESS NOTES
[facility]  Hospitalist Group                 Hospitalist Progress Note  Geeta Griffiths MD  Answering service: [phone] OR [phone] from in house phone        Date of Service:  10/12/2022  NAME:  Clemente Akbar  :  [birth year]  MRN:  [id number]      Admission Summary:   77F with Alzheimer disease, HTN, presenting after witnessed cardiac arrest at home.  immediately started CPR, EMS then found Vifb with Shock x1, followed by asystole. ROSC < 5minutes reported. EMS transported to [facility] , [place] with pulse for 30 seconds started on amio gtt. CTH negative. Moved to [facility] CCU for further management. Patient is post heart cath and AICD placement and off inotropes and transferred to the hospitalist team in the early AM hours of 10/7/22 as an ICU downgrade       Interval history / Subjective:   Patient seen and examined, she denies shortness of breath and reports mild pain in rib cage likely related to prior CPR, he does not remember how she ended up in the hospital and was surprised to hear that she has been here for 10 days  cardiac issues being managed by cardiology and advanced heart failure team  Likely DC home tomorrow if BP stable  Completed ACP discussion today  Spent 30 minutes updating her home med rec in the computer today from a huge bag of medications brought in by family     Assessment & Plan:     V Fib Cardiac arrest prior to admission with CHF based on workup this admission  CHF- new diagnosis of dilated cardiomyopathy, LVEF 10-15%, stage C, NYHA class IIIA    off dobutamine - hypotensive post cath and BP now low stable on midodrine Q8  Heart cath 10/6/22 with following results= Mild one vessel CAD, Rt dominant   Severe LV systolic dysfunction, EF 15 to 20%;  Elevated LVEDP; Mild pulmonary HTN  10/6/22 - ICD implant for Cardiomyopathy LVEF =10 %  & Secondary prevention post cardiac arrest for V fib  Currently Bumex on hold and she is not on rate control meds  Medical management per cardiology and AHF team  She has persistent episodes of symptomatic hypotension after getting her morning spironolactone - she required a 250 bolus on NS today- spironolactone was put on hold      Pulm- stable on RA     GI- Diet as tolerated     Renal- creatinine was mildly elevated post procedures and heart cath   But is now normal after holding Bumex diuretics, remained on low-dose spironolactone until this am    Anemia/ thrombocytopenia-   Impressive drop in plt count attributed to heparin drip d/madiha 10/4, plt count improving, HIT negative  - anemia NOS- monitor Hb- low stable today- no evidence of any GI bleeding      UA positive on admission, completed 3 day course of IV ceftriaxone  Hx of dementia- no evidence of ICU delerium    Low mag- will replete IV, patient has been started on oral magnesium as well  Left sided rib cage pain- IV dilaudid given- suspected costochrondritis from CPR     OT/PT eval and CM consult for DC planning- looks like home with home health when cleared by cardiology and heart failure teams    Code status: Full  Prophylaxis: SCDs  Care Plan discussed with: intensivist  Anticipated Disposition: 24-48hrs     Hospital Problems  Never Reviewed            Codes Class Noted POA    Acute systolic heart failure ([facility] [address] ICD-10-CM: I50.21  ICD-9-CM: 428.21  10/4/2022 Unknown        Cardiac arrest [facility]) ICD-10-CM: I46.9  ICD-9-CM: 427.5  10/1/2022 Unknown       Review of Systems:   A comprehensive review of systems was negative except for that written in the HPI. Vital Signs:    Last 24hrs VS reviewed since prior progress note.  Most recent are:  Visit Vitals  BP (!) 97/55   Pulse 82   Temp 98 °F (36.7 °C)   Resp 26   Ht 5' 2\" (1.575 m)   Wt 48 kg (105 lb 13.1 oz)   SpO2 99%   BMI 19.35 kg/m²     Patient Vitals for the past 24 hrs:   Temp Pulse Resp BP SpO2   10/12/22 1900 -- 82 26 (!) 97/55 --   10/12/22 1800 -- 82 16 (!) 105/58 --   10/12/22 1700 -- 72 18 115/61 --   10/12/22 1600 98 °F (36.7 °C) 71 15 (!) 109/58 99 %   10/12/22 1515 -- 85 23 123/83 92 %   10/12/22 1500 -- 72 14 (!) 97/52 96 %   10/12/22 1418 -- 90 21 112/76 --   10/12/22 1415 -- 86 19 125/66 --   10/12/22 1400 -- 78 20 119/66 --   10/12/22 1300 -- 77 15 122/61 --   10/12/22 1200 97.9 °F (36.6 °C) 75 20 108/67 100 %   10/12/22 1141 -- 90 26 106/71 92 %   10/12/22 1140 -- 83 16 104/73 96 %   10/12/22 1100 -- 76 18 (!) 106/59 100 %   10/12/22 1010 -- 88 20 (!) 108/59 99 %   10/12/22 1009 -- 82 23 (!) 99/52 97 %   10/12/22 1007 -- 83 15 (!) 78/53 99 %   10/12/22 1000 -- 72 14 (!) 105/50 98 %   10/12/22 0900 -- 79 15 (!) 109/56 93 %   10/12/22 0800 98 °F (36.7 °C) 73 15 117/64 98 %   10/12/22 0700 -- 67 11 (!) 96/49 --   10/12/22 0600 -- 66 19 114/66 --   10/12/22 0500 -- 63 11 -- --   10/12/22 0400 -- 68 13 (!) 101/56 --   10/12/22 0100 -- 65 17 -- --   10/12/22 0000 97.8 °F (36.6 °C) 70 18 110/60 --   10/11/22 2300 -- 67 15 -- --   10/11/22 2200 -- 73 16 109/68 --          Intake/Output Summary (Last 24 hours) at 10/12/2022 2115  Last data filed at 10/12/2022 1418  Gross per 24 hour   Intake 590 ml   Output 500 ml   Net 90 ml          Physical Examination:     I had a face to face encounter with this patient and independently examined them on 10/12/2022 as outlined below:          Constitutional:  No acute distress, cooperative, pleasant    HEENT:  EOMI, Oral mucosa moist, oropharynx benign. Resp:  CTA bilaterally. No wheezing/rhonchi/rales. No accessory muscle use. CV:  Regular rhythm, normal rate, no murmurs, gallops, rubs, dressing on left upper chest from AICD placement- CDI without swelling    GI:  Soft, non distended, non tender. normoactive bowel sounds,    Musculoskeletal:  No edema, warm, 2+ pulses throughout    Neurologic:  Moves all extremities.   AAOx3, CN II-XII reviewed  Psych: flat affect, normal mood            Data Review:    Review and/or order of clinical lab test  Review and/or order of tests in the radiology section of CPT  Review and/or order of tests in the medicine section of CPT  CXR Results                   10/06/22 2050  XR CHEST PORT Final result    Impression:  1. Cardiac assist device in the left chest has a single lead which projects to   terminate in the right ventricle. Narrative:  INDICATION: On return to floor. . Evaluate for pneumothorax   Additional history:   COMPARISON: Previous chest xray, 10/4/2022 and 10/2/2022. LIMITATIONS: Portable technique. Monique Retana FINDINGS: Single frontal view of the chest.    .   Lines/tubes/surgical: The cardiac assist device in the left chest has a single   lead which projects to terminate in the right ventricle. Interval removal of a   right IJ approach catheter. Heart/mediastinum: Unremarkable. Lungs/pleura:  No focal consolidation or mass. No visualized pleural effusion or   pneumothorax. Additional Comments: None. .              Results       Procedure Component Value Units Date/Time    URINE CULTURE HOLD SAMPLE [[id number]] Collected: 10/10/22 1536    Order Status: Completed Specimen: Urine from Serum Updated: 10/10/22 1711     Urine culture hold       Urine on hold in Microbiology dept for 2 days. If unpreserved urine is submitted, it cannot be used for addtional testing after 24 hours, recollection will be required.           CULTURE, BLOOD #1 [[id number]] Collected: 10/02/22 0653    Order Status: Completed Specimen: Blood Updated: 10/07/22 1622     Special Requests: NO SPECIAL REQUESTS        Culture result: NO GROWTH 5 DAYS       CULTURE, BLOOD #2 [[id number]] Collected: 10/02/22 0653    Order Status: Completed Specimen: Blood Updated: 10/07/22 1622     Special Requests: NO SPECIAL REQUESTS        Culture result: NO GROWTH 5 DAYS       URINE CULTURE HOLD SAMPLE [[id number]] Collected: 10/01/22 2156    Order Status: Completed Specimen: Urine from Serum Updated: 10/01/22 2203     Urine culture hold       Urine on hold in Microbiology dept for 2 days. If unpreserved urine is submitted, it cannot be used for addtional testing after 24 hours, recollection will be required. Labs:     Recent Labs     10/12/22  0530 10/11/22  0414   WBC 3.9 4.6   HGB 10.3* 9.9*   HCT 33.1* 32.0*    140*       Recent Labs     10/12/22  0530 10/11/22  0414 10/10/22  0502   * 135* 135*   K 4.4 4.0 4.3    104 103   CO2 26 26 26   BUN 11 10 10   CREA 0.79 0.81 0.79   GLU 89 91 85   CA 9.5 9.3 9.0   MG 1.6 1.8 1.6   PHOS 3.0 3.0 3.0       Recent Labs     10/12/22  0530 10/11/22  0414 10/10/22  0502   ALT 33 32 26    91 93   TBILI 0.6 0.5 0.7   TP 6.2* 5.6* 5.6*   ALB 3.0* 2.8* 2.9*   GLOB 3.2 2.8 2.7       Recent Labs     10/12/22  0530 10/11/22  0414 10/10/22  0502   INR 1.0 1.0 1.1   PTP 10.7 10.7 11.1        No results for input(s): FE, TIBC, PSAT, FERR in the last 72 hours. No results found for: FOL, RBCF   No results for input(s): PH, PCO2, PO2 in the last 72 hours. No results for input(s): CPK, CKNDX, TROIQ in the last 72 hours.     No lab exists for component: CPKMB  No results found for: CHOL, CHOLX, CHLST, CHOLV, HDL, HDLP, LDL, LDLC, DLDLP, TGLX, TRIGL, TRIGP, CHHD, CHHDX  Lab Results   Component Value Date/Time    Glucose (POC) 126 (H) 10/03/2022 02:10 PM    Glucose (POC) 143 (H) 10/03/2022 04:20 AM    Glucose (POC) 192 (H) 10/02/2022 11:56 PM    Glucose (POC) 191 (H) 10/02/2022 06:36 PM    Glucose,  (H) 10/01/2022 09:18 PM     Lab Results   Component Value Date/Time    Color YELLOW/STRAW 10/10/2022 03:36 PM    Appearance CLEAR 10/10/2022 03:36 PM    Specific gravity 1.006 10/10/2022 03:36 PM    Specific gravity 1.020 10/01/2022 09:56 PM    pH (UA) 7.0 10/10/2022 03:36 PM    Protein Negative 10/10/2022 03:36 PM    Glucose Negative 10/10/2022 03:36 PM    Ketone Negative 10/10/2022 03:36 PM    Bilirubin Negative 10/10/2022 03:36 PM    Urobilinogen 0.2 10/10/2022 03:36 PM    Nitrites Negative 10/10/2022 03:36 PM    Leukocyte Esterase Negative 10/10/2022 03:36 PM    Epithelial cells MODERATE (A) 10/10/2022 03:36 PM    Bacteria Negative 10/10/2022 03:36 PM    WBC 0-4 10/10/2022 03:36 PM    RBC 0-5 10/10/2022 03:36 PM         Medications Reviewed:     Current Facility-Administered Medications   Medication Dose Route Frequency    traMADoL (ULTRAM) tablet 50 mg  50 mg Oral Q6H PRN    HYDROcodone-acetaminophen (NORCO) 5-325 mg per tablet 1 Tablet  1 Tablet Oral Q4H PRN    midodrine (PROAMATINE) tablet 10 mg  10 mg Oral Q8H    magnesium oxide (MAG-OX) tablet 400 mg  400 mg Oral DAILY    [Held by provider] bumetanide (BUMEX) tablet 1 mg  1 mg Oral DAILY    sodium chloride (NS) flush 5-40 mL  5-40 mL IntraVENous PRN    [Held by provider] spironolactone (ALDACTONE) tablet 12.5 mg  12.5 mg Oral DAILY    lidocaine (XYLOCAINE) 4 % cream   Topical PRN    therapeutic multivitamin (THERAGRAN) tablet 1 Tablet  1 Tablet Oral DAILY    ergocalciferol capsule 50,000 Units  50,000 Units Oral Q7D    guaiFENesin (ROBITUSSIN) 100 mg/5 mL oral liquid 100 mg  100 mg Oral Q4H PRN    lidocaine 4 % patch 1 Patch  1 Patch TransDERmal Q24H    alteplase (CATHFLO) 1 mg in sterile water (preservative free) 1 mL injection  1 mg InterCATHeter PRN    sodium chloride (NS) flush 5-40 mL  5-40 mL IntraVENous Q8H    sodium chloride (NS) flush 5-40 mL  5-40 mL IntraVENous PRN    acetaminophen (TYLENOL) tablet 650 mg  650 mg Oral Q6H PRN    Or    acetaminophen (TYLENOL) suppository 650 mg  650 mg Rectal Q6H PRN    polyethylene glycol (MIRALAX) packet 17 g  17 g Oral DAILY    senna-docusate (PERICOLACE) 8.6-50 mg per tablet 1 Tablet  1 Tablet Oral BID    magnesium hydroxide (MILK OF MAGNESIA) 400 mg/5 mL oral suspension 30 mL  30 mL Oral DAILY PRN    sodium phosphate (FLEET'S) enema 1 Enema  1 Enema Rectal DAILY PRN    ondansetron (ZOFRAN) injection 4 mg  4 mg IntraVENous Q6H PRN ______________________________________________________________________  EXPECTED LENGTH OF STAY: 6d 2h  ACTUAL LENGTH OF STAY:          Francesca Greco MD

## 2022-10-13 NOTE — PROGRESS NOTES
Problem: Mobility Impaired (Adult and Pediatric)  Goal: *Acute Goals and Plan of Care (Insert Text)  Description: FUNCTIONAL STATUS PRIOR TO ADMISSION: Patient was independent and active without use of DME. Ambulating community distances. Denies fall hx. Per family, pt sometimes uses chair lift to 2nd floor bedroom if \"feeling weak that day\". Pt with pmh of alzheimer's. HOME SUPPORT PRIOR TO ADMISSION: The patient lived with spouse but did not require assist.    Physical Therapy Goals  Initiated 10/4/2022. Continue goals x 7 days  1. Patient will move from supine to sit and sit to supine  in bed with modified independence within 7 day(s). 2.  Patient will transfer from bed to chair and chair to bed with modified independence using the least restrictive device within 7 day(s). 3.  Patient will perform sit to stand with modified independence within 7 day(s). 4.  Patient will ambulate with modified independence for 300 feet with the least restrictive device within 7 day(s). 5.  Patient will ascend/descend 12 stairs with 1 handrail(s) with supervision/set-up within 7 day(s). Outcome: Progressing Towards Goal       PHYSICAL THERAPY TREATMENT  Patient: Yoly Hair (74 y.o. female)  Date: 10/13/2022  Diagnosis: Cardiac arrest ([facility] [address] [I46.9] <principal problem not specified>  Procedure(s) (LRB):  INSERT ICD SINGLE (N/A) 7 Days Post-Op  Precautions: Other (comment), Fall (PPM precautions)  Chart, physical therapy assessment, plan of care and goals were reviewed. ASSESSMENT  Patient continues with skilled PT services and is progressing towards goals. Pt generally mobilzed at a SPV level during today's session. Pt received reclining in bedside chair, on RA, tele, family present. Pt very drowsy this session however responds to voice and awakes easily. Pt unable to repeat any of PPM precautions and was re-educated on precaution and family agreeable to help reinforce.  Pt BP taken sitting and standing and stable but low (100's/60's). Pt ambulated in hallway with RW, displaying slow steady self selected gait speed. Pt encouraged to increase gait speed and was able to do so slightly while maintaining stability. Upon completion of trip, pt returned to room and additional standing BP taken and 97/56 and deferred further mobilization 2/2 pt report of fatigue and BP. Pt reclined in chair and session ended with all needs met. Current Level of Function Impacting Discharge (mobility/balance): SPV for all mobility however has good home support (near 24/7 care)    Other factors to consider for discharge: PLOF, alzheimer's, no carryover of PPM precautions         PLAN :  Patient continues to benefit from skilled intervention to address the above impairments. Continue treatment per established plan of care. to address goals. Recommendation for discharge: (in order for the patient to meet his/her long term goals)  Physical therapy at least 2 days/week in the home AND ensure assist and/or supervision for safety with mobility, transfers, stairs    This discharge recommendation:  Has been made in collaboration with the attending provider and/or case management    IF patient discharges home will need the following DME: RW (ordered)       SUBJECTIVE:   Patient stated i'm tired.     OBJECTIVE DATA SUMMARY:   Critical Behavior:  Neurologic State: Alert  Orientation Level: Oriented to person, Oriented to place, Oriented to time, Disoriented to situation  Cognition: Follows commands, Impulsive, Impaired decision making  Safety/Judgement: Awareness of environment, Home safety  Functional Mobility Training:  Bed Mobility:     Supine to Sit: Other (comment) (NT, started and ended up in chair)  Sit to Supine: Other (comment) (NT, started and ended up in chair)           Transfers:  Sit to Stand: Supervision  Stand to Sit: Supervision                             Balance:  Sitting: Intact; Without support  Standing: Intact; With support  Standing - Static: Good;Constant support  Standing - Dynamic : Good;Constant support  Ambulation/Gait Training:  Distance (ft): 100 Feet (ft)  Assistive Device: Gait belt;Walker, rolling           Gait Abnormalities: Decreased step clearance; Path deviations        Base of Support: Center of gravity altered        Step Length: Left shortened;Right shortened          Pain Rating:  Pt did not verbalize pain during session. Activity Tolerance:   Fair and SpO2 stable on RA    After treatment patient left in no apparent distress:   Sitting in chair, Heels elevated for pressure relief, and Bed / chair alarm activated    COMMUNICATION/COLLABORATION:   The patients plan of care was discussed with: Occupational therapist, Registered nurse, and Physician.      Colton Benito, PT   Time Calculation: 25 mins

## 2022-10-14 ENCOUNTER — PATIENT OUTREACH (OUTPATIENT)
Dept: CASE MANAGEMENT | Age: 77
End: 2022-10-14

## 2022-10-14 NOTE — PROGRESS NOTES
Care Transitions Initial Call    Call within 2 business days of discharge: Yes     Patient: Nadege Donahue Patient : 1945 MRN: [id number]    Last Discharge  [address]       Date Complaint Diagnosis Description Type Department Provider    10/2/22  Cardiogenic shock ([facility] [address] . .. Admission (Discharged) Brittani Felix MD            Was this an external facility discharge? No Discharge Facility: na    Challenges to be reviewed by the provider   Additional needs identified to be addressed with provider: no  none         Method of communication with provider : none    Advance Care Planning:   Does patient have an Advance Directive: not on file. Inpatient Readmission Risk score: Unplanned Readmit Risk Score: 11.9    Was this a readmission? no   Patient stated reason for the admission: pt's  reports that pt passed out while eating dinner and he called 911 and started CPR    Patients top risk factors for readmission: medical condition-     Interventions to address risk factors: Obtained and reviewed discharge summary and/or continuity of care documents    Care Transition Nurse (CTN) contacted the patient by telephone to perform post hospital discharge assessment. Verified name and  with patient as identifiers. Provided introduction to self, and explanation of the CTN role. Pt's wife gave verbal permission to speak with her . CTN reviewed discharge instructions, medical action plan and red flags with family who verbalized understanding. Were discharge instructions available to patient? yes. Reviewed appropriate site of care based on symptoms and resources available to patient including: PCP, Specialist,  [facility], When to call 911, and [facility] . Family given an opportunity to ask questions and does not have any further questions or concerns at this time. The family agrees to contact the [facility] office for questions related to their healthcare.      Medication reconciliation was performed with family, who verbalizes understanding of administration of home medications. Advised obtaining a 90-day supply of all daily and as-needed medications. Referral to Pharm D needed: no     Home Health/Outpatient orders at discharge: home health care  [address]: [facility]  Date of initial visit: have not called yet    Discussed follow-up appointments. If no appointment was previously scheduled, appointment scheduling offered:  na . Is follow up appointment scheduled within 7 days of discharge? yes. [facility] follow up appointment(s):   Future Appointments   Date Time Provider Waldo Ramirez   10/18/2022 10:00 AM Yenifer HATCH NP [facility] BS AMB   10/24/2022  8:00 PM BEDROOM 1 [address] SLEEP LAB    11/2/2022  3:00 PM Ulysses Beavers, MD [facility] BS AMB     Non-[facility] follow up appointment(s): PCP    Plan for follow-up call in 3-5 days based on severity of symptoms and risk factors. Plan for next call:  will follow up on [facility] and inquire how pt did over the weekend, will follow up on device site for signs of infection. CTN provided contact information for future needs. Goals Addressed                   This Visit's Progress     Prevent complications post hospitalization. 10/14/2022  Performed medication reconciliation with pt's  and pt has hospital prescribed medications and maintenance medications. Reviewed follow up appt with [facility] on 10/18/2022. Reviewed that [facility] is to call today. Will follow up on Monday, 10/17/2022 concerning HH. Reviewed red flags post hospital discharge including s/s of stroke and pt's  was able to name at least 2 s/s of stroke and states understanding to call 911. Pt's  reports that he called 46 for pt when she \"passed out. \"  Reviewed ICD discharge instructions with pt's  and he states understaning.   Pt's  reports that their daughter lives nearby and is helping at their home.  Pt's  was given the telephone number for [facility] as a mobile urgent care group if needed and reminded pt's  to call [facility] with non-emergent concerns. Noted no follow up for device at this time. Provided pt's  with my contact information. Pt's  states understanding of the importance of daily wts and states understanding to call [facility] for wt gain >2-3 lbs overnight or 5 lbs in a week. We briefly discussed low Na diet and will continue to discuss. Pt's  reports that pt does like stringer but has that less than once a week and he reports that pt asked for some ham for breakfast.  We discussed that the goal is less than 2000mg of Na per day and to read labels for portion size and Na content.   Miguelangel Castillo RN [facility], Care Transitions Nurse, [phone]

## 2022-10-17 ENCOUNTER — PATIENT OUTREACH (OUTPATIENT)
Dept: CASE MANAGEMENT | Age: 77
End: 2022-10-17

## 2022-10-18 ENCOUNTER — OFFICE VISIT (OUTPATIENT)
Dept: CARDIOLOGY CLINIC | Age: 77
End: 2022-10-18
Payer: MEDICARE

## 2022-10-18 VITALS
HEART RATE: 73 BPM | RESPIRATION RATE: 18 BRPM | SYSTOLIC BLOOD PRESSURE: 98 MMHG | TEMPERATURE: 98 F | BODY MASS INDEX: 19.65 KG/M2 | HEIGHT: 62 IN | WEIGHT: 106.8 LBS | OXYGEN SATURATION: 99 % | DIASTOLIC BLOOD PRESSURE: 62 MMHG

## 2022-10-18 DIAGNOSIS — I50.21 ACUTE SYSTOLIC HEART FAILURE (HCC): Primary | ICD-10-CM

## 2022-10-18 LAB
COMMENT, HOLDF: NORMAL
SAMPLES BEING HELD,HOLD: NORMAL

## 2022-10-18 PROCEDURE — G8536 NO DOC ELDER MAL SCRN: HCPCS | Performed by: NURSE PRACTITIONER

## 2022-10-18 PROCEDURE — G8432 DEP SCR NOT DOC, RNG: HCPCS | Performed by: NURSE PRACTITIONER

## 2022-10-18 PROCEDURE — 99214 OFFICE O/P EST MOD 30 MIN: CPT | Performed by: NURSE PRACTITIONER

## 2022-10-18 PROCEDURE — G8427 DOCREV CUR MEDS BY ELIG CLIN: HCPCS | Performed by: NURSE PRACTITIONER

## 2022-10-18 PROCEDURE — G8399 PT W/DXA RESULTS DOCUMENT: HCPCS | Performed by: NURSE PRACTITIONER

## 2022-10-18 PROCEDURE — 1111F DSCHRG MED/CURRENT MED MERGE: CPT | Performed by: NURSE PRACTITIONER

## 2022-10-18 PROCEDURE — 1090F PRES/ABSN URINE INCON ASSESS: CPT | Performed by: NURSE PRACTITIONER

## 2022-10-18 PROCEDURE — 1101F PT FALLS ASSESS-DOCD LE1/YR: CPT | Performed by: NURSE PRACTITIONER

## 2022-10-18 PROCEDURE — G8420 CALC BMI NORM PARAMETERS: HCPCS | Performed by: NURSE PRACTITIONER

## 2022-10-18 PROCEDURE — 99000 SPECIMEN HANDLING OFFICE-LAB: CPT | Performed by: NURSE PRACTITIONER

## 2022-10-18 PROCEDURE — 1123F ACP DISCUSS/DSCN MKR DOCD: CPT | Performed by: NURSE PRACTITIONER

## 2022-10-18 NOTE — PATIENT INSTRUCTIONS
Medication changes:    No medication changes     Please take this to your pharmacy to notify them of the change in medications. Testing Ordered:    Labs drawn today in clinic. You will be notified of any abnormal results that require a change in medication regimen. Other Recommendations:      Ensure your drinking an adequate amount of water with a goal of 6-8 eight ounce glasses (1.5-2 liters) of fluid daily. Your urine should be clear and light yellow straw colored. If your blood pressure begins to consistently run below 90/60 and/or you begin to experience dizziness or lightheadedness, please contact the [facility] at [phone]. Follow up 2 to 3 weeks with [facility]      Please monitor your weights daily upon waking and after using the bathroom. Keep a written records of your weights and bring to your next appointment. If you have a weight gain of 3 or more pounds overnight OR 5 or more pounds in one week please contact our office. Thank you for allowing us the privilege of being a part of your healthcare team! Please do not hesitate to contact our office at [phone] with any questions or concerns. Virtual Heart Failure [address] invites you to learn more about heart failure and to share your questions, ideas, and experiences with others. Each month, the Heart Failure Support Group features a new educational topic and a guest speaker, followed by an interactive discussion. Our Heart Failure Nurse Navigator will moderate each session. You will be able to participate by phone, tablet or computer through [address] [place] [address]. This support group takes place on the 3rd Thursday of each month from 6:00-7:30PM. All individuals living with heart failure and their caregivers are welcome to join.      If you are interested in participating, please contact us at [email or web address] and you will be sent the link to join the Zoom meeting.

## 2022-10-18 NOTE — PROGRESS NOTES
[facility], [address] [facility] Note    Patient name: Katheryn Schuler  Patient : 1945  Patient MRN: [id number]  Date of service: 10/18/22        CHIEF COMPLAINT:  Chief Complaint   Patient presents with    Hospital Follow Up     Discharged on 10/13          PLAN OF CARE:   66 y/o with h/o new diagnosis of dilated cardiomyopathy, LVEF 10-15%, stage C, NYHA class IIIA admitted 10/2-10/13/22 with VF cardiac arrest c/b acute renal and hepatic failure  Stable hemodynamics off Doutamine; hopefully LVEF and hemodynamics remain stable off inotropic support. poor candidate for home inotropes  LHC/RHC  off inotropes with minimal CAD, PCWP 26 and CI of 2.3  D/w family that LVEF may improve over the course of 3 months; however, if the heart function does not improve or patient condition deteriorates would reconsider goals of care and hospice at home. RECOMMENDATIONS:  Continue midodrine 10mg PO TID, wean as able  Beta-blocker: none d/t hypotension, consider if BP improves  ACE/ARB/ARNi: none d/t hypotension, consider if BP improves  MRA: pt was on spironolactone 12.5 in hospital, but this was stopped d/t symptomatic hypotension following administration of this.     SGLT2 inhibitor: not started due to recent UTI; can obtain repeat UA with labs and then consider adding   Diuretic: none needed  Continue MagOx  Not on allopurinol or uloric, uric acid level 3.1  Not on ASA or statin  Continue Vit D  Sleep med consulted while in hospital; scheduled 10/24  ICD interrogation every 3 months per EP  Echocardiogram: repeat after 3 mos recovery (2023)  Routine HF labs in clinic today   Genetic testing sent in hospital- results pending  Reinforced heart healthy, low salt diet  Reinforced appropriate fluid intake of 6 x 8oz glasses of water per day  Monitor and record daily weights and BPs  Provided advanced care plan forms to be filled out Advanced care plan present on file  Follow-up with nutritionist for weight loss  Follow-up with primary cardiologist  Follow-up with EP cardiologist  Follow-up with PCP  Recommend flu, pneumonia, and COVID vaccinations    Return to [address] in 2 weeks with NP/MD        IMPRESSION:  Sinus bradycardia, resolved  Acute on chronic HFrEF heart failure  Stage C, NYHA class IV symptoms  Dilated cardiomyopathy, LVEF 10-15%  C/b VF cardiac arrest, cardiogenic shock, renal and hepatic failure- now resolved  Gammopathy FLC ratio is slightly elevated, no M Flynn  PYP equivocal  Cardiac risk factors:  HTN  Leukocytosis, improved  Likely UTI (culture not obtained)  Neurocognitive dysfunction  Alzheimer disease  Cannot drive  Memory loss  Vitamin D deficiency      CARDIAC IMAGING AND DIAGNOSTICS REVIEWED:  Echo (10/2/22)    Left Ventricle: Severely reduced left ventricular systolic function with a visually estimated EF of 10 -15%. Left ventricle is severely dilated. Normal wall thickness. Severe global hypokinesis present. Right Ventricle: Moderately reduced systolic function. Left Atrium: Left atrium is mildly dilated. Right Atrium: Right atrium is dilated. Technical qualifiers: Echo study was technically difficult. EKG (10/3/22) NSR, septal infarct    C 10/6/22 mild 1V CAD     RHC 10/6/22 PAP 53/24/35, RA 9, PCWP 26, CI 2.3         HISTORY OF PRESENT ILLNESS:  I had the pleasure of seeing Viet Lizarraga in [address] [place] [address] at [address] [place] [address] in [place]. Briefly, Viet Lizarraga is a 65 y.o. female with h/o newly diagnosed severe cardiomyopathy LVEF 25-30% was awaiting CT angiogram had cardiac arrest and collapsed at dinner table. CPR was initiated by her  right away and then paramedic within 7 minutes. She was transferred to [facility] [place] initiated on code ice protocol.  She was started on diuretics due to significant dyspnea, family described NYHA class IIIB symptoms for 7 days prior to arrest.           INTERVAL HISTORY:  Today, patient presents for routine clinic visit, accompanied by her  and daughter who provide much fo the history due to the patients history of cognitive impairment. she reports doing well overall. Her daughter states she mostly complains of feeling tired. she is able to walk around the house with her walker. she arrived to our clinic via wheelchair. She has home PTand OT twice per week and is doing well with that. Patient denies symptoms of volume overload or leg edema. Reports a fair appetite. she denies abdominal bloating, nausea or early satiety. Patient's weight remained stable. she denies orthopnea, PND or nocturia. Denies irregular heart rate or palpitations. No presyncope or syncope. she is compliant with fluid restriction and taking medications as prescribed. Patient manages his own medications. REVIEW OF SYSTEMS:  Review of Systems   Constitutional:  Negative for chills, fever and weight loss. Respiratory:  Negative for cough and shortness of breath. Cardiovascular:  Negative for palpitations, orthopnea and leg swelling. Gastrointestinal:  Positive for diarrhea. Negative for constipation, heartburn, nausea and vomiting. Neurological:  Negative for dizziness and weakness. Psychiatric/Behavioral:  Positive for memory loss. PHYSICAL EXAM:  Visit Vitals  BP 98/62 (BP 1 Location: Left upper arm, BP Patient Position: Sitting, BP Cuff Size: Adult)   Pulse 73   Temp 98 °F (36.7 °C) (Oral)   Resp 18   Ht 5' 2\" (1.575 m)   Wt 106 lb 12.8 oz (48.4 kg)   SpO2 99%   BMI 19.53 kg/m²     Physical Exam  Vitals reviewed. Constitutional:       General: She is not in acute distress. Appearance: She is underweight. Cardiovascular:      Rate and Rhythm: Normal rate and regular rhythm. Pulses: Normal pulses. Heart sounds: Normal heart sounds. No murmur heard. No friction rub. No gallop.    Pulmonary: Effort: Pulmonary effort is normal. No respiratory distress. Breath sounds: Normal breath sounds. Abdominal:      General: Abdomen is flat. There is no distension. Palpations: Abdomen is soft. Tenderness: There is no abdominal tenderness. Musculoskeletal:      Right lower leg: No edema. Left lower leg: No edema. Skin:     General: Skin is warm and dry. Capillary Refill: Capillary refill takes 2 to 3 seconds. Neurological:      General: No focal deficit present. Mental Status: She is alert and oriented to person, place, and time. Psychiatric:         Mood and Affect: Mood normal.         Behavior: Behavior normal.         Thought Content: Thought content normal.         Judgment: Judgment normal.        PAST MEDICAL HISTORY:  Past Medical History:   Diagnosis Date    Arthritis     Chronic pain     \"my right side has been hurting for 3-4 months\"    Hypertension        PAST SURGICAL HISTORY:  Past Surgical History:   Procedure Laterality Date    HX BREAST BIOPSY Bilateral     neg    HX BREAST REDUCTION Bilateral 2000    HX GI  2004    colon resection after perforation during ovarian cyst removal    HX HEENT      skin cyst removed from neck    HX HEENT      uvuloplasty    HX HYSTERECTOMY      and bladder repair    HX SHOULDER ARTHROSCOPY      right    HX UROLOGICAL      bladder repair during hysterectomy       FAMILY HISTORY:  History reviewed. No pertinent family history.     SOCIAL HISTORY:  Social History     Socioeconomic History    Marital status:    Tobacco Use    Smoking status: Former     Packs/day: 0.25     Types: Cigarettes     Quit date:      Years since quittin.8    Smokeless tobacco: Never   Vaping Use    Vaping Use: Never used   Substance and Sexual Activity    Alcohol use: No    Drug use: No    Sexual activity: Not Currently       LABORATORY RESULTS:  Labs Latest Ref Rng & Units 10/13/2022 10/12/2022 10/11/2022 10/10/2022 10/9/2022 10/8/2022 10/7/2022   WBC 3.6 - 11.0 K/uL 4.2 3.9 4.6 5.6 6.0 6.3 6.8   RBC 3.80 - 5.20 M/uL 4.03 4.09 4.08 4.39 4.69 4.25 4.38   Hemoglobin 11.5 - 16.0 g/dL 9.8(L) 10. 3(L) 9.9(L) 10. 8(L) 11. 3(L) 10. 6(L) 10. 7(L)   Hematocrit 35.0 - 47.0 % 31. 6(L) 33. 1(L) 32. 0(L) 34. 3(L) 36.5 32. 2(L) 33. 6(L)   MCV 80.0 - 99.0 FL 78. 4(L) 80.9 78. 4(L) 78. 1(L) 77. 8(L) 75. 8(L) 76. 7(L)   Platelets 039 - 858 K/uL 147(L) 154 140(L) 149(L) 149(L) 147(L) 112(L)   Lymphocytes 12 - 49 % 23 26 17 17 18 15 10(L)   Monocytes 5 - 13 % 14(H) 15(H) 14(H) 16(H) 14(H) 19(H) 19(H)   Eosinophils 0 - 7 % 4 5 5 3 4 1 1   Basophils 0 - 1 % 1 1 1 0 0 0 0   Bands 0 - 6 % - - - - - - -   Albumin 3.5 - 5.0 g/dL 2. 7(L) 3.0(L) 2. 8(L) 2. 9(L) 3.4(L) 3. 3(L) 3. 3(L)   Calcium 8.5 - 10.1 MG/DL 9.1 9.5 9.3 9.0 9.3 9.1 9.4   Glucose 65 - 100 mg/dL 84 89 91 85 80 110(H) 100   BUN 6 - 20 MG/DL 12 11 10 10 12 17 16   Creatinine 0.55 - 1.02 MG/DL 0.69 0.79 0.81 0.79 0.95 1.16(H) 1.04(H)   Sodium 136 - 145 mmol/L 136 134(L) 135(L) 135(L) 134(L) 135(L) 138   Potassium 3.5 - 5.1 mmol/L 4.5 4.4 4.0 4.3 4.0 4.2 4.9   TSH 0.36 - 3.74 uIU/mL - - - - - - -   Some recent data might be hidden       ALLERGY:  Allergies   Allergen Reactions    Iron Other (comments)     IV IRON only gave her convulsions    Lactose Diarrhea        CURRENT MEDICATIONS:    Current Outpatient Medications:     midodrine (PROAMATINE) 10 mg tablet, Take 1 Tablet by mouth every eight (8) hours for 30 days. , Disp: 90 Tablet, Rfl: 0    magnesium oxide (MAG-OX) 400 mg tablet, Take 1 Tablet by mouth daily. , Disp: 60 Tablet, Rfl: 0    senna-docusate (PERICOLACE) 8.6-50 mg per tablet, Take 1 Tablet by mouth two (2) times daily as needed for Constipation. , Disp: 60 Tablet, Rfl: 0    polyethylene glycol (MIRALAX) 17 gram packet, Take 1 Packet by mouth daily as needed for Constipation. , Disp: 30 Each, Rfl: 0    alendronate (FOSAMAX) 70 mg tablet, Take 70 mg by mouth every seven (7) days. , Disp: , Rfl:     Cholestyramine-Aspartame 4 gram powder, Take 4 g by mouth three (3) times daily (with meals). , Disp: , Rfl:     memantine (NAMENDA) 10 mg tablet, Take 10 mg by mouth two (2) times a day., Disp: , Rfl:     cholecalciferol, VITAMIN D3, (VITAMIN D3) 5,000 unit tab tablet, Take  by mouth daily. , Disp: , Rfl:     multivitamin with minerals (ONE-A-DAY 50 PLUS PO), Take 1 Tab by mouth daily. , Disp: , Rfl:     hyoscyamine SR (LEVBID) 0.375 mg SR tablet, Take 0.375 mg by mouth two (2) times a day. (Patient not taking: No sig reported), Disp: , Rfl:       Oneida Crisostomo.  Jackie Salazar, MSN, [facility]-[address], [address]  Phone: [phone]  Fax: [phone]    PATIENT CARE TEAM:  Patient Care Team:  Betha Kawasaki, MD as PCP - General (Family Medicine)  Betha Kawasaki, MD as PCP - [facility]  Pete Lin RN as Care Transitions Nurse

## 2022-10-19 LAB
ALBUMIN SERPL-MCNC: 3.5 G/DL (ref 3.5–5)
ALBUMIN/GLOB SERPL: 1 {RATIO} (ref 1.1–2.2)
ALP SERPL-CCNC: 114 U/L (ref 45–117)
ALT SERPL-CCNC: 35 U/L (ref 12–78)
ANION GAP SERPL CALC-SCNC: 7 MMOL/L (ref 5–15)
AST SERPL-CCNC: 35 U/L (ref 15–37)
BILIRUB SERPL-MCNC: 0.5 MG/DL (ref 0.2–1)
BNP SERPL-MCNC: ABNORMAL PG/ML
BUN SERPL-MCNC: 16 MG/DL (ref 6–20)
BUN/CREAT SERPL: 17 (ref 12–20)
CALCIUM SERPL-MCNC: 10.3 MG/DL (ref 8.5–10.1)
CHLORIDE SERPL-SCNC: 108 MMOL/L (ref 97–108)
CO2 SERPL-SCNC: 22 MMOL/L (ref 21–32)
CREAT SERPL-MCNC: 0.93 MG/DL (ref 0.55–1.02)
ERYTHROCYTE [DISTWIDTH] IN BLOOD BY AUTOMATED COUNT: 14.5 % (ref 11.5–14.5)
GLOBULIN SER CALC-MCNC: 3.5 G/DL (ref 2–4)
GLUCOSE SERPL-MCNC: 84 MG/DL (ref 65–100)
HCT VFR BLD AUTO: 34.7 % (ref 35–47)
HGB BLD-MCNC: 10.8 G/DL (ref 11.5–16)
MAGNESIUM SERPL-MCNC: 1.7 MG/DL (ref 1.6–2.4)
MCH RBC QN AUTO: 25.8 PG (ref 26–34)
MCHC RBC AUTO-ENTMCNC: 31.1 G/DL (ref 30–36.5)
MCV RBC AUTO: 83 FL (ref 80–99)
NRBC # BLD: 0 K/UL (ref 0–0.01)
NRBC BLD-RTO: 0 PER 100 WBC
PLATELET # BLD AUTO: 192 K/UL (ref 150–400)
PMV BLD AUTO: 12.1 FL (ref 8.9–12.9)
POTASSIUM SERPL-SCNC: 4.2 MMOL/L (ref 3.5–5.1)
PROT SERPL-MCNC: 7 G/DL (ref 6.4–8.2)
RBC # BLD AUTO: 4.18 M/UL (ref 3.8–5.2)
SODIUM SERPL-SCNC: 137 MMOL/L (ref 136–145)
WBC # BLD AUTO: 3.6 K/UL (ref 3.6–11)

## 2022-10-21 ENCOUNTER — PATIENT OUTREACH (OUTPATIENT)
Dept: CASE MANAGEMENT | Age: 77
End: 2022-10-21

## 2022-10-24 ENCOUNTER — HOSPITAL ENCOUNTER (OUTPATIENT)
Dept: SLEEP MEDICINE | Age: 77
Discharge: HOME | End: 2022-10-24
Attending: NURSE PRACTITIONER
Payer: MEDICARE

## 2022-10-24 VITALS
OXYGEN SATURATION: 99 % | TEMPERATURE: 98.6 F | HEART RATE: 71 BPM | SYSTOLIC BLOOD PRESSURE: 114 MMHG | DIASTOLIC BLOOD PRESSURE: 65 MMHG

## 2022-10-24 DIAGNOSIS — G47.33 OSA (OBSTRUCTIVE SLEEP APNEA): ICD-10-CM

## 2022-10-24 PROCEDURE — 95807 SLEEP STUDY ATTENDED: CPT | Performed by: SPECIALIST

## 2022-10-24 PROCEDURE — 95810 POLYSOM 6/> YRS 4/> PARAM: CPT | Performed by: SPECIALIST

## 2022-10-25 ENCOUNTER — TELEPHONE (OUTPATIENT)
Dept: SLEEP MEDICINE | Age: 77
End: 2022-10-25

## 2022-10-25 NOTE — PROGRESS NOTES
[address] [facility] [place]. [place], [address]  Tel.  [phone]  Fax. 100 [facility] 60  [place], [address] [facility]  Tel.  [phone]  Fax. [phone] [address]   Tel.  [phone]  Fax. [phone]     Sleep Study Technical Notes        PRE-Test:  Viet Lizarraga (: 1945) arrived in the lobby. Patient is scheduled for a diagnostic study. Patient was greeted, temperature checked (98.6 ) and screening questions asked. The patient was taken directly to the assigned room. BP (114/65) and SpO2 (99%) were taken. Procedure was explained to the patient and questions were answered. Electrodes were applied without incident. The patient was placed in bed and the study was started. Acquisition Notes:  Lights off: 9:19 PM  Sleep onset: 9:27 PM  Respiratory events: Obstructive and central events  Snore: Mild  ECG:  Abnormalities observed throughout the night  Limb movements: Yes  PAP titration: N/A  Mask(s) Used: N/A  Other comments: Patient was accompanied by her . She ambulates with a walker. Patient fell asleep quickly. Once asleep she snored intermittently, mildly. UAR and occasional respiratory events were observed. Patient's Oxygen levels remained in the 90th percentile. Patient entered all stages of sleep. She slept in all positions but prone. Patient removed wires multiple times in confusion. Patient used the restroom 2 times  Patient had caregiver in attendance: Yes,    Patient watched TV/ used phone after lights out for 0 hours  Patient slept with positional therapy:  No  Patient slept with head of bed elevated:  No  Patient wore an oral appliance:  No    POST Test:  Patient was awakened. Electrodes were removed. The patient was discharged after completing the Post Questionnaire. Patient stated that they were alert and ok to drive. Equipment and room cleaned per infection control policy.

## 2022-10-30 NOTE — TELEPHONE ENCOUNTER
PSG performed for potential sleep disordered breathing. 491.2 minutes recorded of which 377.1 minutes spent asleep with a sleep efficiency of 76.8%. Sleep onset at 7.6 minutes; REM onset at 150.5 minutes with total REM representing 13.3% of sleep time. All sleep stages observed. 40 respiratory events occurred of which 22 hypopnea and 18 apnea (3 central, 6 mixed, 9 obstructive). Overall AHI is mildly elevated at 6.4/h. Minimal SaO2 of 92%. Patient supine and lateral.  Mild intermittent snoring noted. Patient removed wires multiple times during the recording. Impression: Borderline sleep disordered breathing. Desaturations are not noted. Patient carries diagnosis of dementia. Given difficulties patient experienced during the study, specific treatment such as CPAP or oral appliance may be problematic. Recommendation: Titration study if clinical status warrants. Sleep technologist: Please review study results and recommendations with patient's  who accompanied her for the study.

## 2022-10-31 ENCOUNTER — PATIENT OUTREACH (OUTPATIENT)
Dept: CASE MANAGEMENT | Age: 77
End: 2022-10-31

## 2022-10-31 NOTE — PROGRESS NOTES
Goals        Prevent complications post hospitalization. 10/14/2022  Performed medication reconciliation with pt's  and pt has hospital prescribed medications and maintenance medications. Reviewed follow up appt with [facility] on 10/18/2022. Reviewed that [facility] is to call today. Will follow up on Monday, 10/17/2022 concerning HH. Reviewed red flags post hospital discharge including s/s of stroke and pt's  was able to name at least 2 s/s of stroke and states understanding to call 911. Pt's  reports that he called [phone] for pt when she \"passed out. \"  Reviewed ICD discharge instructions with pt's  and he states understaning. Pt's  reports that their daughter lives nearby and is helping at their home. Pt's  was given the telephone number for [employer] as a mobile urgent care group if needed and reminded pt's  to call [facility] with non-emergent concerns. Noted no follow up for device at this time. Provided pt's  with my contact information. Pt's  states understanding of the importance of daily wts and states understanding to call [facility] for wt gain >2-3 lbs overnight or 5 lbs in a week. We briefly discussed low Na diet and will continue to discuss. Pt's  reports that pt does like stringer but has that less than once a week and he reports that pt asked for some ham for breakfast.  We discussed that the goal is less than 2000mg of Na per day and to read labels for portion size and Na content. Bijal Can RN [address] [facility], Care Transitions Nurse, [phone]     10/17/2022  Pt's  reports that pt had a few episodes of low BP pressures and then had better readings:   851a - 130/70   958a 84/48   10ooa 82/51   1030a 109/57   945 a took midodrine, 116/50  315 116/50, HR 75     Wt on 10/16 99lb, wt 10/17 100lbs  Spoke with pt's daughter extensively about the importance of reading labels.   Daughter reports that their bp cuff appears to be about 10 points lower SBP from PT BP reading. We discussed that if daughter feels that pt is having a medical emergency to call 911 as family knows pt best.  Pt's  reports that pt has not had chest pain for the last few days. Pt's family is frequently taking blood pressure readings and was advised to ask NP Emilie Lino on frequency of BP, advised to check if pt has symptoms or does not appear well but recommended twice a day. Pt's daughter reports that they have a new digital scale and will be using that. Pt's family encouraged to take BP cuff in with them as well as readings to check their cuff against office device. Will call on 10/20/2022 to review [facility] follow up appt, check daily wts and some BP readings. Jennifer Ayoub RN [facility], Care Transitions Nurse, [phone]   Pt's daughter reports that pt was feeling tired with low BP readings but did not appear pale. Jennifer Ayoub RN [address] [facility], Care Transitions Nurse, [phone]     10/21/2022  Confirmed with pt's  that pt followed up with [facility] and confirmed no changes to medications. Reviewed the amount of fluid that pt is to drink each day per AVS from [facility]. Pt's  reports that pt's wt today is 105.8 lbs and SBP was 126. Pt's  confirmed sleep study on 10/24/2022. Will call in 7 days to review daily wts and blood pressure readings. Jennifer Ayoub RN [address] [facility], Care Transitions Nurse, [phone]     10/31/2022  Pt's  reports that pt is doing well. Pt's  reports that pt's wt is unchanged and appetite is good. Pt's  reports no changes in medications. Pt's  is agreeable to a call in 7 days. Will review daily wts and pt's BP readings.   Jennifer Ayoub RN [address] [facility] Care Transitions Nurse, [phone]

## 2022-11-01 NOTE — TELEPHONE ENCOUNTER
Reviewed sleep study results with patients  and daughter. They expressed understanding. Patient is scheduled for follow-up with Dr. Umu Molina 11/2/22 to further review results/recommendations.

## 2022-11-02 ENCOUNTER — OFFICE VISIT (OUTPATIENT)
Dept: SLEEP MEDICINE | Age: 77
End: 2022-11-02
Payer: MEDICARE

## 2022-11-02 VITALS
DIASTOLIC BLOOD PRESSURE: 67 MMHG | BODY MASS INDEX: 19.98 KG/M2 | SYSTOLIC BLOOD PRESSURE: 115 MMHG | OXYGEN SATURATION: 100 % | WEIGHT: 108.6 LBS | HEART RATE: 90 BPM | HEIGHT: 62 IN

## 2022-11-02 DIAGNOSIS — G47.33 OSA (OBSTRUCTIVE SLEEP APNEA): Primary | ICD-10-CM

## 2022-11-02 PROCEDURE — G8420 CALC BMI NORM PARAMETERS: HCPCS | Performed by: SPECIALIST

## 2022-11-02 PROCEDURE — G8432 DEP SCR NOT DOC, RNG: HCPCS | Performed by: SPECIALIST

## 2022-11-02 PROCEDURE — G8399 PT W/DXA RESULTS DOCUMENT: HCPCS | Performed by: SPECIALIST

## 2022-11-02 PROCEDURE — G8536 NO DOC ELDER MAL SCRN: HCPCS | Performed by: SPECIALIST

## 2022-11-02 PROCEDURE — 1111F DSCHRG MED/CURRENT MED MERGE: CPT | Performed by: SPECIALIST

## 2022-11-02 PROCEDURE — 1090F PRES/ABSN URINE INCON ASSESS: CPT | Performed by: SPECIALIST

## 2022-11-02 PROCEDURE — 1101F PT FALLS ASSESS-DOCD LE1/YR: CPT | Performed by: SPECIALIST

## 2022-11-02 PROCEDURE — 1123F ACP DISCUSS/DSCN MKR DOCD: CPT | Performed by: SPECIALIST

## 2022-11-02 PROCEDURE — G8428 CUR MEDS NOT DOCUMENT: HCPCS | Performed by: SPECIALIST

## 2022-11-02 PROCEDURE — 99204 OFFICE O/P NEW MOD 45 MIN: CPT | Performed by: SPECIALIST

## 2022-11-02 NOTE — PROGRESS NOTES
[address] [facility] [place]. [place], [address]  Tel.  [phone]  Fax. 100 [facility] 60  [place], [address] [facility]  Tel.  [phone]  Fax. [phone] [address] [facility] [address]   Tel.  [phone]  Fax. [phone]       Chief Complaint       Chief Complaint   Patient presents with    Sleep Problem     HF Patient, results review       HPI      Fritz Lanes is 65 y.o. female seen for evaluation of a sleep disorder. Accompanied by her  who assists with history. Reports she has experienced snoring having had a uvular procedure at least 6 years ago when they were living in [place]. Snoring has become less prominent. PSG performed for potential sleep disordered breathing. 491.2 minutes recorded of which 377.1 minutes spent asleep with a sleep efficiency of 76.8%. Sleep onset at 7.6 minutes; REM onset at 150.5 minutes with total REM representing 13.3% of sleep time. All sleep stages observed. 40 respiratory events occurred of which 22 hypopnea and 18 apnea (3 central, 6 mixed, 9 obstructive). Overall AHI is mildly elevated at 6.4/h. Minimal SaO2 of 92%. Patient supine and lateral.  Mild intermittent snoring noted. Patient removed wires multiple times during the recording. Impression: Borderline sleep disordered breathing. Desaturations are not noted. [place] Sleepiness Score: 2       Allergies   Allergen Reactions    Iron Other (comments)     IV IRON only gave her convulsions    Lactose Diarrhea       Current Outpatient Medications   Medication Sig Dispense Refill    midodrine (PROAMATINE) 10 mg tablet Take 1 Tablet by mouth every eight (8) hours for 30 days. 90 Tablet 0    magnesium oxide (MAG-OX) 400 mg tablet Take 1 Tablet by mouth daily. 60 Tablet 0    senna-docusate (PERICOLACE) 8.6-50 mg per tablet Take 1 Tablet by mouth two (2) times daily as needed for Constipation.  60 Tablet 0    polyethylene glycol (MIRALAX) 17 gram packet Take 1 Packet by mouth daily as needed for Constipation. 30 Each 0    alendronate (FOSAMAX) 70 mg tablet Take 70 mg by mouth every seven (7) days. hyoscyamine SR (LEVBID) 0.375 mg SR tablet Take 0.375 mg by mouth two (2) times a day. (Patient not taking: No sig reported)      Cholestyramine-Aspartame 4 gram powder Take 4 g by mouth three (3) times daily (with meals). memantine (NAMENDA) 10 mg tablet Take 10 mg by mouth two (2) times a day. cholecalciferol, VITAMIN D3, (VITAMIN D3) 5,000 unit tab tablet Take  by mouth daily. multivitamin with minerals (ONE-A-DAY 50 PLUS PO) Take 1 Tab by mouth daily. She  has a past medical history of Arthritis, Chronic pain, and Hypertension. She  has a past surgical history that includes hx gi (2004); hx urological; hx hysterectomy (1982); hx shoulder arthroscopy; hx heent; hx heent; hx breast reduction (Bilateral, 2000); and hx breast biopsy (Bilateral). She family history is not on file. She  reports that she quit smoking about 54 years ago. Her smoking use included cigarettes. She smoked an average of .25 packs per day. She has never used smokeless tobacco. She reports that she does not drink alcohol and does not use drugs. Review of Systems:  ROS      Objective:   Visit Vitals  /67 (BP 1 Location: Left upper arm, BP Patient Position: Sitting)   Pulse 90   Ht 5' 2\" (1.575 m)   Wt 108 lb 9.6 oz (49.3 kg)   SpO2 100%   BMI 19.86 kg/m²     Body mass index is 19.86 kg/m². General:   cooperative   Eyes:   no nystagmus,   Oropharynx:   Mallampati score I, tongue normal,        Neck:   No carotid bruits; Neck circ. in \"inches\": 13   Chest/Lungs:  Clear on auscultation    CVS:  Normal rate, regular rhythm   Skin:  Warm to touch; no obvious rashes   Neuro:   face symmetrical, tongue movement normal   Psych:  normal countenance        Assessment:       ICD-10-CM ICD-9-CM    1.  JOHANA (obstructive sleep apnea)  G47.33 327.23         Mild sleep disordered breathing not associated with arterial desaturation. At present, further specific sleep therapy not indicated. Patient has been will contact the office as needed. Plan:     No orders of the defined types were placed in this encounter. * Patient has a history consistent with the diagnosis of sleep apnea. * Treatment options if indicated were reviewed today. Instructions:  Do not engage in activities requiring a normal degree of alertness if fatigue is present. Call or return if symptoms worsen or persist.          Chadd Hanson MD, [facility]  Electronically signed 11/03/22       This note was created using voice recognition software. Despite editing, there may be syntax errors. This note will not be viewable in [address]

## 2022-11-03 ENCOUNTER — TELEPHONE (OUTPATIENT)
Dept: CARDIOLOGY CLINIC | Age: 77
End: 2022-11-03

## 2022-11-03 RX ORDER — BUMETANIDE 0.5 MG/1
0.5 TABLET ORAL DAILY
Qty: 5 TABLET | Refills: 0 | Status: SHIPPED | OUTPATIENT
Start: 2022-11-03 | End: 2022-11-16 | Stop reason: SDUPTHER

## 2022-11-03 NOTE — TELEPHONE ENCOUNTER
Pt  called in regarding patients HR    Called patients  Dennis Victoria, reviewed BP, HR weights and symptoms below:  Reporting weight last Friday of 105.7lb  Tuesday- 110/70, 80, 108.2lb  Wednesday- 124/84, 84, 108.6lb  Thursday- 121/78, 89, 109.2lb    Physical therapist Becca Weaver with [facility] reports concern of patient breathing heavier and weight increase. Spoke with provider Austyn Barrera NP regarding above. New orders received for Bumex 0.5mg daily for two days (Saturday and Sunday) and start patient on farxiga 5mg daily (per last provider office visit since UA is negative)    Nurse called patient  Dennis Victoria back regarding new orders above. Counseled regarding new medications including dose frequency, purpose, and s/s of UTI (such as a burning feeling when passing urine, the need to urinate right away, pain in the lower part of your stomach) as well as UTI prevention. Counseled patients  to continue to monitor BP/HR and weights and to call [phone] with any changes or new symptoms.  able to repeat back new med orders and care instructions. Reviewed patients follow up appt scheduled for Tuesday 11/8 at 10am,  will call prior to appt with any questions or problems.

## 2022-11-07 ENCOUNTER — TELEPHONE (OUTPATIENT)
Dept: CARDIOLOGY CLINIC | Age: 77
End: 2022-11-07

## 2022-11-07 NOTE — TELEPHONE ENCOUNTER
Telephone Call RE:  Appointment reminder     Outcome:     [] Patient confirmed appointment   [] Patient rescheduled appointment for    [x] Unable to reach  [x] Left message              [] Other:           Krista Ledezma

## 2022-11-08 ENCOUNTER — PATIENT OUTREACH (OUTPATIENT)
Dept: CASE MANAGEMENT | Age: 77
End: 2022-11-08

## 2022-11-08 NOTE — PROGRESS NOTES
Care Transitions Outreach Attempt    Attempted to reach patient for transitions of care follow up. Unable to reach patient. Left voicemail stating my name, CTN with [employer], date and time of call, and return telephone number. Patient: Ortiz Patel Patient : 1945 MRN: [id number]    Last Discharge [address]       Date Complaint Diagnosis Description Type Department Provider    10/2/22  Cardiogenic shock ([address] . ..  Admission (Discharged) Lillian Stallings MD              Noted following upcoming appointments from discharge chart review:   [facility] follow up appointment(s):   Future Appointments   Date Time Provider Waldo Ramirez   2022 10:00 AM Emery Matthews NP [facility] BS AMB

## 2022-11-09 ENCOUNTER — OFFICE VISIT (OUTPATIENT)
Dept: CARDIOLOGY CLINIC | Age: 77
End: 2022-11-09
Payer: MEDICARE

## 2022-11-09 VITALS
OXYGEN SATURATION: 99 % | WEIGHT: 111.6 LBS | BODY MASS INDEX: 20.54 KG/M2 | RESPIRATION RATE: 16 BRPM | SYSTOLIC BLOOD PRESSURE: 126 MMHG | TEMPERATURE: 97.8 F | HEART RATE: 85 BPM | DIASTOLIC BLOOD PRESSURE: 78 MMHG | HEIGHT: 62 IN

## 2022-11-09 DIAGNOSIS — I42.8 NICM (NONISCHEMIC CARDIOMYOPATHY) (HCC): ICD-10-CM

## 2022-11-09 DIAGNOSIS — I50.21 ACUTE SYSTOLIC HEART FAILURE (HCC): Primary | ICD-10-CM

## 2022-11-09 PROCEDURE — 99214 OFFICE O/P EST MOD 30 MIN: CPT | Performed by: NURSE PRACTITIONER

## 2022-11-09 PROCEDURE — 1090F PRES/ABSN URINE INCON ASSESS: CPT | Performed by: NURSE PRACTITIONER

## 2022-11-09 PROCEDURE — 1101F PT FALLS ASSESS-DOCD LE1/YR: CPT | Performed by: NURSE PRACTITIONER

## 2022-11-09 PROCEDURE — G8399 PT W/DXA RESULTS DOCUMENT: HCPCS | Performed by: NURSE PRACTITIONER

## 2022-11-09 PROCEDURE — G8420 CALC BMI NORM PARAMETERS: HCPCS | Performed by: NURSE PRACTITIONER

## 2022-11-09 PROCEDURE — G8427 DOCREV CUR MEDS BY ELIG CLIN: HCPCS | Performed by: NURSE PRACTITIONER

## 2022-11-09 PROCEDURE — 1111F DSCHRG MED/CURRENT MED MERGE: CPT | Performed by: NURSE PRACTITIONER

## 2022-11-09 PROCEDURE — 1123F ACP DISCUSS/DSCN MKR DOCD: CPT | Performed by: NURSE PRACTITIONER

## 2022-11-09 PROCEDURE — G8536 NO DOC ELDER MAL SCRN: HCPCS | Performed by: NURSE PRACTITIONER

## 2022-11-09 PROCEDURE — G8432 DEP SCR NOT DOC, RNG: HCPCS | Performed by: NURSE PRACTITIONER

## 2022-11-09 RX ORDER — MIDODRINE HYDROCHLORIDE 2.5 MG/1
5 TABLET ORAL
Qty: 180 TABLET | Refills: 1 | Status: SHIPPED | OUTPATIENT
Start: 2022-11-09 | End: 2023-01-08

## 2022-11-09 NOTE — PROGRESS NOTES
[facility] in [place], [facility] Note    Patient name: Jacki Coleman  Patient : 1945  Patient MRN: [id number]  Date of service: 22        CHIEF COMPLAINT:  Chief Complaint   Patient presents with    CHF     Follow up for CHF          PLAN OF CARE:   64 y/o with h/o dementia, new diagnosis of dilated cardiomyopathy, LVEF 10-15%, stage C, NYHA class IIIA admitted 10/2-10/13/22 with VF cardiac arrest c/b acute renal and hepatic failure; initially supported on inotropes; LHC/RHC off inotropes with minimal CAD, PCWP 26 and CI of 2.3; poor candidate for home inotropes d/t dementia and pt discharged home on midodrine  D/w family that LVEF may improve over the course of 3 months; however, if the heart function does not improve or patient condition deteriorates would reconsider goals of care and hospice at home    RECOMMENDATIONS:  Continue midodrine, decrease to 5mg PO TID, monitor BP, continue to wean as able  Beta-blocker: none d/t hypotension, consider if BP improves  ACE/ARB/ARNi: none d/t hypotension, consider if BP improves  MRA: pt was on spironolactone 12.5 in hospital, but this was stopped d/t symptomatic hypotension following administration of this. SGLT2 inhibitor: Continue Farxiga 5mg daily  Diuretic: decision on bumex dosing pending lab results  Continue MagOx  Not on allopurinol or uloric, uric acid level 3.1  Not on ASA or statin  Continue Vit D  Sleep study done, borderline sleep disordered breathing noted, no recs for PAP therapy given concerns for pt's ability to tolerate therapy d/t dementia.    ICD interrogation every 3 months per EP  Echocardiogram: repeat after 3 mos recovery (2023)  Routine HF labs in office today: CMP, NT Pro, Mg  Genetic testing sent in hospital- results pending  Reinforced heart healthy, low salt diet  Reinforced appropriate fluid intake of 6 x 8oz glasses of water per day  Monitor and record daily weights and BPs  Provided advanced care plan forms to be filled out    Advanced care plan present on file  Follow-up with primary cardiologist  Follow-up with EP cardiologist  Follow-up with PCP  Recommend flu, pneumonia, and COVID vaccinations    Return to [facility] in 1 month with NP/MD        IMPRESSION:  Sinus bradycardia, resolved  Acute on chronic HFrEF heart failure  Stage C, NYHA class IV symptoms  Dilated cardiomyopathy, LVEF 10-15%  C/b VF cardiac arrest, cardiogenic shock, renal and hepatic failure- now resolved  Gammopathy FLC ratio is slightly elevated, no M Flynn  PYP equivocal  Cardiac risk factors:  HTN  Leukocytosis, improved  Likely UTI (culture not obtained)  Neurocognitive dysfunction  Alzheimer disease  Cannot drive  Memory loss  Vitamin D deficiency      CARDIAC IMAGING AND DIAGNOSTICS REVIEWED:  Echo (10/2/22)    Left Ventricle: Severely reduced left ventricular systolic function with a visually estimated EF of 10 -15%. Left ventricle is severely dilated. Normal wall thickness. Severe global hypokinesis present. Right Ventricle: Moderately reduced systolic function. Left Atrium: Left atrium is mildly dilated. Right Atrium: Right atrium is dilated. Technical qualifiers: Echo study was technically difficult. EKG (10/3/22) NSR, septal infarct    C 10/6/22 mild 1V CAD     RHC 10/6/22 PAP 53/24/35, RA 9, PCWP 26, CI 2.3         HISTORY OF PRESENT ILLNESS:  I had the pleasure of seeing Nissa Waddell in [facility] at [address] in [address] [facility]. Briefly, Nissa Waddell is a 65 y.o. female with h/o newly diagnosed severe cardiomyopathy LVEF 25-30% was awaiting CT angiogram had cardiac arrest and collapsed at dinner table. CPR was initiated by her  right away and then paramedic within 7 minutes. She was transferred to [facility] [place] initiated on code ice protocol.  She was started on diuretics due to significant dyspnea, family described NYHA class IIIB symptoms for 7 days prior to arrest.           INTERVAL HISTORY:  Today, patient presents for routine clinic visit, accompanied by her ; her daughter was on the phone, and they provide much fo the history due to the patients history of cognitive impairment/dementia. she reports doing well overall. Her daughter states that she has noticed her strength and energy are improved and BP has been better. she is able to walk around the house with her walker. she arrived to our clinic via wheelchair. She has home PTand OT twice per week and is doing well with that. Patient denies symptoms of volume overload or leg edema. Reports a fair appetite. she denies abdominal bloating, nausea or early satiety. Patient's weight remained stable. she denies orthopnea, PND or nocturia. Denies irregular heart rate or palpitations. No presyncope or syncope. she is compliant with fluid restriction and taking medications as prescribed. Patient manages his own medications. REVIEW OF SYSTEMS:  Review of Systems   Constitutional:  Negative for chills, fever and weight loss. Respiratory:  Negative for cough and shortness of breath. Cardiovascular:  Negative for palpitations, orthopnea and leg swelling. Gastrointestinal:  Positive for diarrhea. Negative for constipation, heartburn, nausea and vomiting. Neurological:  Negative for dizziness and weakness. Psychiatric/Behavioral:  Positive for memory loss. PHYSICAL EXAM:  Visit Vitals  /78 (BP 1 Location: Left upper arm, BP Patient Position: Sitting, BP Cuff Size: Adult)   Pulse 85   Temp 97.8 °F (36.6 °C) (Oral)   Resp 16   Ht 5' 2\" (1.575 m)   Wt 111 lb 9.6 oz (50.6 kg)   SpO2 99%   BMI 20.41 kg/m²     Physical Exam  Vitals reviewed. Constitutional:       General: She is not in acute distress. Appearance: She is underweight. Cardiovascular:      Rate and Rhythm: Normal rate and regular rhythm.       Pulses: Normal pulses. Heart sounds: Normal heart sounds. No murmur heard. No friction rub. No gallop. Pulmonary:      Effort: Pulmonary effort is normal. No respiratory distress. Breath sounds: Examination of the right-lower field reveals rales. Examination of the left-lower field reveals rales. Rales present. Abdominal:      General: Abdomen is flat. There is no distension. Palpations: Abdomen is soft. Tenderness: There is no abdominal tenderness. Musculoskeletal:      Right lower leg: No edema. Left lower leg: No edema. Skin:     General: Skin is warm and dry. Capillary Refill: Capillary refill takes 2 to 3 seconds. Neurological:      General: No focal deficit present. Mental Status: She is alert and oriented to person, place, and time. Psychiatric:         Mood and Affect: Mood normal.         Behavior: Behavior normal.         Thought Content: Thought content normal.         Judgment: Judgment normal.        PAST MEDICAL HISTORY:  Past Medical History:   Diagnosis Date    Arthritis     Chronic pain     \"my right side has been hurting for 3-4 months\"    Hypertension        PAST SURGICAL HISTORY:  Past Surgical History:   Procedure Laterality Date    HX BREAST BIOPSY Bilateral     neg    HX BREAST REDUCTION Bilateral 2000    HX GI  2004    colon resection after perforation during ovarian cyst removal    HX HEENT      skin cyst removed from neck    HX HEENT      uvuloplasty    HX HYSTERECTOMY      and bladder repair    HX SHOULDER ARTHROSCOPY      right    HX UROLOGICAL      bladder repair during hysterectomy       FAMILY HISTORY:  History reviewed. No pertinent family history.     SOCIAL HISTORY:  Social History     Socioeconomic History    Marital status:    Tobacco Use    Smoking status: Former     Packs/day: 0.25     Types: Cigarettes     Quit date:      Years since quittin.8    Smokeless tobacco: Never   Vaping Use    Vaping Use: Never used Substance and Sexual Activity    Alcohol use: No    Drug use: No    Sexual activity: Not Currently       LABORATORY RESULTS:  Labs Latest Ref Rng & Units 10/18/2022 10/13/2022 10/12/2022 10/11/2022 10/10/2022 10/9/2022 10/8/2022   WBC 3.6 - 11.0 K/uL 3.6 4.2 3.9 4.6 5.6 6.0 6.3   RBC 3.80 - 5.20 M/uL 4.18 4.03 4.09 4.08 4.39 4.69 4.25   Hemoglobin 11.5 - 16.0 g/dL 10. 8(L) 9.8(L) 10. 3(L) 9.9(L) 10. 8(L) 11. 3(L) 10. 6(L)   Hematocrit 35.0 - 47.0 % 34. 7(L) 31. 6(L) 33. 1(L) 32. 0(L) 34. 3(L) 36.5 32. 2(L)   MCV 80.0 - 99.0 FL 83.0 78. 4(L) 80.9 78. 4(L) 78. 1(L) 77. 8(L) 75. 8(L)   Platelets 866 - 124 K/uL 192 147(L) 154 140(L) 149(L) 149(L) 147(L)   Lymphocytes 12 - 49 % - 23 26 17 17 18 15   Monocytes 5 - 13 % - 14(H) 15(H) 14(H) 16(H) 14(H) 19(H)   Eosinophils 0 - 7 % - 4 5 5 3 4 1   Basophils 0 - 1 % - 1 1 1 0 0 0   Bands 0 - 6 % - - - - - - -   Albumin 3.5 - 5.0 g/dL 3.5 2.7(L) 3.0(L) 2. 8(L) 2. 9(L) 3.4(L) 3. 3(L)   Calcium 8.5 - 10.1 MG/DL 10. 3(H) 9.1 9.5 9.3 9.0 9.3 9.1   Glucose 65 - 100 mg/dL 84 84 89 91 85 80 110(H)   BUN 6 - 20 MG/DL 16 12 11 10 10 12 17   Creatinine 0.55 - 1.02 MG/DL 0.93 0.69 0.79 0.81 0.79 0.95 1.16(H)   Sodium 136 - 145 mmol/L 137 136 134(L) 135(L) 135(L) 134(L) 135(L)   Potassium 3.5 - 5.1 mmol/L 4.2 4.5 4.4 4.0 4.3 4.0 4.2   TSH 0.36 - 3.74 uIU/mL - - - - - - -   Some recent data might be hidden       ALLERGY:  Allergies   Allergen Reactions    Iron Other (comments)     IV IRON only gave her convulsions    Lactose Diarrhea        CURRENT MEDICATIONS:    Current Outpatient Medications:     dapagliflozin (FARXIGA) 5 mg tab tablet, Take 1 Tablet by mouth daily. , Disp: 30 Tablet, Rfl: 0    midodrine (PROAMATINE) 10 mg tablet, Take 1 Tablet by mouth every eight (8) hours for 30 days. , Disp: 90 Tablet, Rfl: 0    magnesium oxide (MAG-OX) 400 mg tablet, Take 1 Tablet by mouth daily. , Disp: 60 Tablet, Rfl: 0    senna-docusate (PERICOLACE) 8.6-50 mg per tablet, Take 1 Tablet by mouth two (2) times daily as needed for Constipation. , Disp: 60 Tablet, Rfl: 0    polyethylene glycol (MIRALAX) 17 gram packet, Take 1 Packet by mouth daily as needed for Constipation. , Disp: 30 Each, Rfl: 0    alendronate (FOSAMAX) 70 mg tablet, Take 70 mg by mouth every seven (7) days. , Disp: , Rfl:     Cholestyramine-Aspartame 4 gram powder, Take 4 g by mouth three (3) times daily (with meals). , Disp: , Rfl:     memantine (NAMENDA) 10 mg tablet, Take 10 mg by mouth two (2) times a day., Disp: , Rfl:     cholecalciferol, VITAMIN D3, (VITAMIN D3) 5,000 unit tab tablet, Take  by mouth daily. , Disp: , Rfl:     multivitamin with minerals (ONE-A-DAY 50 PLUS PO), Take 1 Tab by mouth daily. , Disp: , Rfl:     bumetanide (BUMEX) 0.5 mg tablet, Take 1 Tablet by mouth daily. Take 1 tablet Friday and Saturday only, additional tablets as directed by [facility] (Patient not taking: Reported on 11/9/2022), Disp: 5 Tablet, Rfl: 0    hyoscyamine SR (LEVBID) 0.375 mg SR tablet, Take 0.375 mg by mouth two (2) times a day. (Patient not taking: Reported on 11/9/2022), Disp: , Rfl:       Madiha Maya, LILIANE, AGACNP-BC  [address] [place] [address], [address]  Phone: [phone]  Fax: [phone]    PATIENT CARE TEAM:  Patient Care Team:  Linda Sanabria MD as PCP - General (Family Medicine)  Linda Sanabria MD as PCP - [facility]  Abran Romero RN as Care Transitions Nurse

## 2022-11-09 NOTE — PATIENT INSTRUCTIONS
Medication changes:    Decrease midodrine to 5mg three times daily- take two 2.5mg tablets three times a day           Please take this to your pharmacy to notify them of the change in medications. Testing Ordered:    Labs drawn in clinic. You will be notified of any abnormal results that require a change in medication regimen    Other Recommendations: Take blood pressures in the morning before taking medication and two hours after medication. Write it down and keep a log. Call if systolic (top number) blood pressure is less than 100     An order for echo  has been placed to be done January. You will be receiving an automated call from Coordination of [facility] to schedule this test. If you are unavailable to receive the call or would like to contact coordination of care yourself you may contact [phone] to schedule. You will need to contact coordination  care yourself if you miss their calls as they will only make 3 attempts to reach you. Ensure your drinking an adequate amount of water with a goal of 6-8 eight ounce glasses (1.5-2 liters) of fluid daily. Your urine should be clear and light yellow straw colored. If your blood pressure begins to consistently run below 90/60 and/or you begin to experience dizziness or lightheadedness, please contact the [facility] at [phone]. Follow up 1 month with [facility]      Please monitor your weights daily upon waking and after using the bathroom. Keep a written records of your weights and bring to your next appointment. If you have a weight gain of 3 or more pounds overnight OR 5 or more pounds in one week please contact our office. Thank you for allowing us the privilege of being a part of your healthcare team! Please do not hesitate to contact our office at [phone] with any questions or concerns.        Virtual Heart Failure [address] invites you to learn more about heart failure and to share your questions, ideas, and experiences with others. Each month, the Heart Failure Support Group features a new educational topic and a guest speaker, followed by an interactive discussion. Our Heart Failure Nurse Navigator will moderate each session. You will be able to participate by phone, tablet or computer through [employer]. This support group takes place on the 3rd Thursday of each month from 6:00-7:30PM. All individuals living with heart failure and their caregivers are welcome to join. If you are interested in participating, please contact us at [email or web address] and you will be sent the link to join the [employer].

## 2022-11-10 LAB
ALBUMIN SERPL-MCNC: 3.8 G/DL (ref 3.5–5)
ALBUMIN/GLOB SERPL: 1.2 {RATIO} (ref 1.1–2.2)
ALP SERPL-CCNC: 85 U/L (ref 45–117)
ALT SERPL-CCNC: 30 U/L (ref 12–78)
ANION GAP SERPL CALC-SCNC: 10 MMOL/L (ref 5–15)
AST SERPL-CCNC: 24 U/L (ref 15–37)
BILIRUB SERPL-MCNC: 0.8 MG/DL (ref 0.2–1)
BNP SERPL-MCNC: ABNORMAL PG/ML
BUN SERPL-MCNC: 17 MG/DL (ref 6–20)
BUN/CREAT SERPL: 15 (ref 12–20)
CALCIUM SERPL-MCNC: 9.4 MG/DL (ref 8.5–10.1)
CHLORIDE SERPL-SCNC: 112 MMOL/L (ref 97–108)
CO2 SERPL-SCNC: 21 MMOL/L (ref 21–32)
CREAT SERPL-MCNC: 1.1 MG/DL (ref 0.55–1.02)
GLOBULIN SER CALC-MCNC: 3.1 G/DL (ref 2–4)
GLUCOSE SERPL-MCNC: 94 MG/DL (ref 65–100)
MAGNESIUM SERPL-MCNC: 1.8 MG/DL (ref 1.6–2.4)
POTASSIUM SERPL-SCNC: 3.2 MMOL/L (ref 3.5–5.1)
PROT SERPL-MCNC: 6.9 G/DL (ref 6.4–8.2)
SODIUM SERPL-SCNC: 143 MMOL/L (ref 136–145)

## 2022-11-14 ENCOUNTER — PATIENT OUTREACH (OUTPATIENT)
Dept: CASE MANAGEMENT | Age: 77
End: 2022-11-14

## 2022-11-14 NOTE — PROGRESS NOTES
Patient has graduated from the Transitions of Care Coordination  program on 11/14/2022. Patient/family has the ability to self-manage at this time Care management goals have been completed. Patient was not referred to the [facility] team for further management. Goals Addressed                   This Visit's Progress     COMPLETED: Prevent complications post hospitalization. 10/14/2022  Performed medication reconciliation with pt's  and pt has hospital prescribed medications and maintenance medications. Reviewed follow up appt with [facility] on 10/18/2022. Reviewed that [facility] is to call today. Will follow up on Monday, 10/17/2022 concerning HH. Reviewed red flags post hospital discharge including s/s of stroke and pt's  was able to name at least 2 s/s of stroke and states understanding to call 911. Pt's  reports that he called 46 for pt when she \"passed out. \"  Reviewed ICD discharge instructions with pt's  and he states understaning. Pt's  reports that their daughter lives nearby and is helping at their home. Pt's  was given the telephone number for [facility] as a mobile urgent care group if needed and reminded pt's  to call [facility] with non-emergent concerns. Noted no follow up for device at this time. Provided pt's  with my contact information. Pt's  states understanding of the importance of daily wts and states understanding to call [facility] for wt gain >2-3 lbs overnight or 5 lbs in a week. We briefly discussed low Na diet and will continue to discuss. Pt's  reports that pt does like stringer but has that less than once a week and he reports that pt asked for some ham for breakfast.  We discussed that the goal is less than 2000mg of Na per day and to read labels for portion size and Na content.   Sergio Harmon RN [facility], Care Transitions Nurse, [phone]     10/17/2022  Pt's  reports that pt had a few episodes of low BP pressures and then had better readings:   851a - 130/70   958a 84/48   10ooa 82/51   1030a 109/57   945 a took midodrine, 116/50  315 116/50, HR 75     Wt on 10/16 99lb, wt 10/17 100lbs  Spoke with pt's daughter extensively about the importance of reading labels. Daughter reports that their bp cuff appears to be about 10 points lower SBP from PT BP reading. We discussed that if daughter feels that pt is having a medical emergency to call 911 as family knows pt best.  Pt's  reports that pt has not had chest pain for the last few days. Pt's family is frequently taking blood pressure readings and was advised to ask NP Di Odom on frequency of BP, advised to check if pt has symptoms or does not appear well but recommended twice a day. Pt's daughter reports that they have a new digital scale and will be using that. Pt's family encouraged to take BP cuff in with them as well as readings to check their cuff against office device. Will call on 10/20/2022 to review [facility] follow up appt, check daily wts and some BP readings. Wilver Sweeney RN [facility], Care Transitions Nurse, [phone]   Pt's daughter reports that pt was feeling tired with low BP readings but did not appear pale. Wilver Sweeney RN [address] [facility], Care Transitions Nurse, [phone]     10/21/2022  Confirmed with pt's  that pt followed up with [facility] and confirmed no changes to medications. Reviewed the amount of fluid that pt is to drink each day per AVS from [facility]. Pt's  reports that pt's wt today is 105.8 lbs and SBP was 126. Pt's  confirmed sleep study on 10/24/2022. Will call in 7 days to review daily wts and blood pressure readings. Wilver Sweeney RN [facility], Care Transitions Nurse, [phone]     10/31/2022  Pt's  reports that pt is doing well. Pt's  reports that pt's wt is unchanged and appetite is good. Pt's  reports no changes in medications.   Pt's  is agreeable to a call in 7 days.  Will review daily wts and pt's BP readings. Valencia Disla RN [address] [facility], Care Transitions Nurse, [phone]     11/8/2022  Left voicemail stating my name, CTN with [facility], date and time of call, and return telephone number. Valencia Disla RN [address] [facility], Care Transitions Nurse, [phone]     11/14/2022  Spoke with pt's  who reports understanding to change in midodrine. Pt's  reports that pt has scheduled follow up with [facility] and was advised to contact that office through [address] [place] [address] [id number] concerning pt's continued productive cough and what medication may be prescribed. Pt's  states no concerns at this time and confirms that he has my contact information if needed in the future. Valencia Disla RN [facility], Care Transitions Nurse, [phone]                        Patient has Care Transition Nurse's contact information for any further questions, concerns, or needs.   Patients upcoming visits:    Future Appointments   Date Time Provider Waldo Ramirez   12/16/2022  8:30 AM Christofer Sal MD [facility] BS AMB   1/5/2023  2:30 PM ECHO LAB [facility] GALE Ken

## 2022-11-17 ENCOUNTER — TELEPHONE (OUTPATIENT)
Dept: CARDIOLOGY CLINIC | Age: 77
End: 2022-11-17

## 2022-11-17 DIAGNOSIS — I50.21 ACUTE SYSTOLIC HEART FAILURE (HCC): Primary | ICD-10-CM

## 2022-11-17 DIAGNOSIS — R06.02 SHORTNESS OF BREATH: ICD-10-CM

## 2022-11-17 NOTE — TELEPHONE ENCOUNTER
called to report patient weight since he spoke with Manda Schilder on Monday night. Weight on Tuesday was 111.9 lb, patient took bumex 0.5 mg on Tuesday and Wednesday, weight today is 110.6 lb, BP this am at 06:00 was 112/75, HR 87, BP at 08:00 was 117/80, HR 90. She is less short of breath but still has a cough. Per Lizzy, will have patient take bumex 0.5 mg every day, watch weight and BP, call if SBP goes below 100, have labs completed next week.  states understanding of instructions. Orders placed.

## 2022-11-20 RX ORDER — BUMETANIDE 0.5 MG/1
TABLET ORAL
Qty: 30 TABLET | Refills: 0 | Status: SHIPPED | OUTPATIENT
Start: 2022-11-20 | End: 2022-12-01 | Stop reason: DRUGHIGH

## 2022-11-20 RX ORDER — DAPAGLIFLOZIN 5 MG/1
TABLET, FILM COATED ORAL
Qty: 30 TABLET | Refills: 0 | Status: SHIPPED | OUTPATIENT
Start: 2022-11-20 | End: 2022-12-01 | Stop reason: SDUPTHER

## 2022-11-25 ENCOUNTER — TELEPHONE (OUTPATIENT)
Dept: CARDIOLOGY CLINIC | Age: 77
End: 2022-11-25

## 2022-11-25 DIAGNOSIS — I50.22 CHRONIC SYSTOLIC HEART FAILURE (HCC): Primary | ICD-10-CM

## 2022-12-01 ENCOUNTER — TELEPHONE (OUTPATIENT)
Dept: CARDIOLOGY CLINIC | Age: 77
End: 2022-12-01

## 2022-12-01 RX ORDER — BUMETANIDE 0.5 MG/1
TABLET ORAL
Qty: 30 TABLET | Refills: 1 | Status: SHIPPED | OUTPATIENT
Start: 2022-12-01

## 2022-12-01 NOTE — TELEPHONE ENCOUNTER
----- Message from Myron Graves NP sent at 12/1/2022 12:28 PM EST -----  Regarding: FW: Chest Xray  I looked at radiology report- could not see images. Looks like some fluid overload still- would continue with current bumex dose and monitor weight- likely needs ~4lbs down from weight on day of xray. May need CT chest for follow up d/t possible nodular opacity vs artifact- would send results to pt's PCP for follow up of that. Called patient and spoke with patents  emma on hippa. Notified him on above information per Lennie Chu NP and that results will be faxed to PCP for further review of imaging. He stated understanding. He notes that patient has been taking bumex 0.5mg daily but patient was only sent 5 tablets with last refill. He notes that patients cough has lessened but that patient has had some itching for which PCP prescribed hydroxyzine. He reports patients weight log as follows     11/24 112.5 lbs   11/25 111/3 lbs   11/26 108.5 lbs   11/27 109.7 lbs   11/28 106.8 lbs   11/29 108.1 lbs   11/30 106.5 lbs   12/1   106.2  lbs     Patient  also requests refill on farxiga. Advised him that I will send message to provider and call back with further information on bumex dosing and refills. BOB Bergeron RN, RN  Caller: Unspecified (Today,  2:17 PM)  We can refill the bumex and have her take through the weekend, then starting next week only prn for weight gain >3lbs in a day or more than 5lbs in a week. Called patient and spoke with patient  emma on [employer]. Used two patient identifiers. Notified him on above information per Lennie Chu NP. He stated understanding of instructions. Notified patient that prescriptions will be sent to pharmacy. He stated understanding of instructions and will notify patient.  Bruno Byrd RN        Requested Prescriptions     Signed Prescriptions Disp Refills    dapagliflozin Cali Beat) 5 mg tab tablet 30 Tablet 0 Sig: Take 1 Tablet by mouth daily. Authorizing Provider: Carlin Taylor     Ordering User: CARRIE Etienne    bumetanide (BUMEX) 0.5 mg tablet 30 Tablet 1     Sig: Take one tablet by mouth daily as needed as directed by [facility] for weight gain 3 lbs or more overnight or 5 lbs or more in one week.      Authorizing Provider: Carlin Taylor     Ordering User: Sergio Pulliam     Chest x ray results faxed to PCP per Jann Moyer NP. Baljeet Brown RN

## 2022-12-06 DIAGNOSIS — I50.21 ACUTE SYSTOLIC HEART FAILURE (HCC): Primary | ICD-10-CM

## 2022-12-06 RX ORDER — MIDODRINE HYDROCHLORIDE 2.5 MG/1
5 TABLET ORAL
Qty: 180 TABLET | Refills: 1 | Status: CANCELLED | OUTPATIENT
Start: 2022-12-06 | End: 2023-02-04

## 2022-12-07 NOTE — TELEPHONE ENCOUNTER
Called patient spouse regarding refill to alert him that midodrine has an available refill, informed spouse to call pharmacy. He states understanding. He also asks about magnesium which is about to run out. This had been prescribed by hospital MD. RN asked if patient had labs drawn as ordered around end of November. Spouse states she had labs drawn at PCP office Dr Isaac Verdugo, RN will obtain labs. RN spoke to records they have no record of any labs being done at her office visit on 11/28. Last magnesium on file is in September for them. RN called patient spouse back to alert him of need to take patient for labs that had not yet been drawn including CMP, Mag, and proBNP. He states he will take her to [facility] today. RN faxing labs to [facility] choice [facility]. Labs sent to office, potassium level of 2.8 noted elevated creatinine, and elevated proBNP improved from last check. Per Dr Yadira Dudley: Check on patient weights, have patient take potassium 40Meq daily x5 days. Then have BMP and probnp rechecked. Have patient take bumex only as needed for weight gain, and when taking a bumex patient to take 20meq of potassium. Interaction noted for potassium and hyoscyamine, informed Dr Yadira Dudley, she recommended reaching out to pharmacist for guidance. Called pt pharamacy, they are at lunch until 2, will call again after 2, confirmed with pharmacist Lindalou Gottron at [facility] and Dr Yadira Dudley, okay for patient to take. Log of patient weights:  Thursday 106.2  friday 104.6  saturday 104.4  sunday 102.8  monday 103.5  tuesady 103.5  wednesday 104.4  thursday 104.1    Spouse educated about new potassium rx and (how to take as well as possible effect of GI upset) and lab order to be drawn by Tuesday. Spouse able to teach back in his own words.  RN to fax labs to [facility] at [place] per request.

## 2022-12-08 ENCOUNTER — TELEPHONE (OUTPATIENT)
Dept: CARDIOLOGY CLINIC | Age: 77
End: 2022-12-08

## 2022-12-08 RX ORDER — POTASSIUM CHLORIDE 20 MEQ/1
40 TABLET, EXTENDED RELEASE ORAL DAILY
Qty: 30 TABLET | Refills: 1 | Status: SHIPPED | OUTPATIENT
Start: 2022-12-08

## 2022-12-08 RX ORDER — LANOLIN ALCOHOL/MO/W.PET/CERES
400 CREAM (GRAM) TOPICAL DAILY
Qty: 60 TABLET | Refills: 0 | Status: SHIPPED | OUTPATIENT
Start: 2022-12-08

## 2022-12-10 RX ORDER — MIDODRINE HYDROCHLORIDE 2.5 MG/1
5 TABLET ORAL
Qty: 270 TABLET | Refills: 2 | Status: SHIPPED | OUTPATIENT
Start: 2022-12-10

## 2022-12-13 ENCOUNTER — TELEPHONE (OUTPATIENT)
Dept: CARDIOLOGY CLINIC | Age: 77
End: 2022-12-13

## 2022-12-13 DIAGNOSIS — R53.83 LETHARGY: ICD-10-CM

## 2022-12-13 DIAGNOSIS — I50.21 ACUTE SYSTOLIC HEART FAILURE (HCC): Primary | ICD-10-CM

## 2022-12-13 NOTE — TELEPHONE ENCOUNTER
Received call from patient's  Estelita Garner requesting call back from nurse stating wife has a follow up appointment for Friday, 12/16, however, she is not feeling good and doesn't think she's going to make it to her appointment.

## 2022-12-13 NOTE — TELEPHONE ENCOUNTER
Called patient spouse back. He reports patient is not doing well. She is barley eating anything since about 4 days ago  sleeping alot, very lethargic, spouse denies vomiting, fever, he took her temp during call it was 98. He is states she is not drinking enough fluid maybe only 24oz a day. Weight and BP log below:  saturday 103.4 98/69, HR 99  sunday 105.4 103/72, HR 88  Monday 105.9 103/78, HR 93  tuesday 106.5lb 114/81, HR 96    She is due for labs today as a result of last labs and medication adjustment, he will bring her. Last week potassium level of 2.8 noted elevated creatinine, and elevated proBNP, patient ordered to take potassium 40Meq daily x5 days, she has completed this. And bumex changed to prn for weight gain. Per spouse she has not needed to take this. RN will check with provider if any other labs needed and regarding recommendations on above. Per Ivan Avery Add a UA, procalitonin and sed rate      RN called  back to relay above information, RN faxing labs to [facility] along with labs ordered last week. He will bring patient today. RN also encouraged spouse to encourage hydration/ nutrition while we wait for lab results. Educated spouse to call back with any new symptoms/concerns. Called patients spouse regarding UA results. Spouse reports patient was taken to [facility] ER this morning as she was in distress.  RN to fax labs to ER at [phone]

## 2022-12-14 ENCOUNTER — TELEPHONE (OUTPATIENT)
Dept: CARDIOLOGY CLINIC | Age: 77
End: 2022-12-14

## 2022-12-14 NOTE — TELEPHONE ENCOUNTER
Telephone Call RE:  Appointment reminder     Outcome:     [x] Patient confirmed appointment   [] Patient rescheduled appointment for    [] Unable to reach  [] Left message              [] Other:           Zhou Noel

## 2022-12-17 LAB
APPEARANCE UR: ABNORMAL
BACTERIA #/AREA URNS HPF: ABNORMAL /[HPF]
BACTERIA UR CULT: NORMAL
BILIRUB UR QL STRIP: NEGATIVE
CASTS URNS QL MICRO: ABNORMAL /LPF
COLOR UR: YELLOW
EPI CELLS #/AREA URNS HPF: ABNORMAL /HPF (ref 0–10)
ERYTHROCYTE [SEDIMENTATION RATE] IN BLOOD BY WESTERGREN METHOD: 19 MM/HR (ref 0–40)
GLUCOSE UR QL STRIP: NEGATIVE
HGB UR QL STRIP: ABNORMAL
KETONES UR QL STRIP: ABNORMAL
LEUKOCYTE ESTERASE UR QL STRIP: ABNORMAL
MICRO URNS: ABNORMAL
NITRITE UR QL STRIP: NEGATIVE
PH UR STRIP: 6 [PH] (ref 5–7.5)
PROCALCITONIN SERPL-MCNC: 0.34 NG/ML (ref 0–0.08)
PROT UR QL STRIP: ABNORMAL
RBC #/AREA URNS HPF: >30 /HPF (ref 0–2)
SP GR UR STRIP: 1.03 (ref 1–1.03)
URINALYSIS REFLEX, 377202: ABNORMAL
UROBILINOGEN UR STRIP-MCNC: 1 MG/DL (ref 0.2–1)
WBC #/AREA URNS HPF: ABNORMAL /HPF (ref 0–5)

## 2022-12-28 ENCOUNTER — TELEPHONE (OUTPATIENT)
Dept: CARDIOLOGY CLINIC | Age: 77
End: 2022-12-28

## 2022-12-28 NOTE — TELEPHONE ENCOUNTER
Beulah received from patients daughter who is on hippa form. She reports patient is still admitted to [facility] expecting to be [facility]. She has questions/ concers regarding treatment/medication at [facility]. She is asking why fraxiga was stopped. RN reviewed chart and did not see anywhere that Merleen [place] was stopped. RN explained that it might not be given at hospital due to most hospital pharmacies do not carry it. She also had a question about patient being started on metoprolol. RN read last office note from November regarding beta blocker had not been used due to hypotension, consider if BP improves. RN explained that we are not able to direct patient care at this time since she is hospitalized. RN suggested daughter log in to my chart to show MD at hospital patients last visit note with [facility] as that may help guide care. Otherwise suggested daughter to let us know once patient discharged and we can request records and set up follow up appt.

## 2022-12-29 RX ORDER — BUMETANIDE 0.5 MG/1
TABLET ORAL
Qty: 30 TABLET | Refills: 1 | Status: SHIPPED | OUTPATIENT
Start: 2022-12-29

## 2022-12-29 NOTE — TELEPHONE ENCOUNTER
Requested Prescriptions     Pending Prescriptions Disp Refills    bumetanide (BUMEX) 0.5 mg tablet 30 Tablet 1     Sig: Take one tablet by mouth daily as needed as directed by [facility] for weight gain 3 lbs or more overnight or 5 lbs or more in one week.

## 2023-03-07 RX ORDER — MIDODRINE HYDROCHLORIDE 2.5 MG/1
5 TABLET ORAL
Qty: 540 TABLET | Refills: 1 | Status: SHIPPED | OUTPATIENT
Start: 2023-03-07

## (undated) DEVICE — KIT AT-X65 ANGIOTOUCH HAND CONTROLLER

## (undated) DEVICE — CATH IV AUTOGRD BC PNK 20GA 25 -- INSYTE

## (undated) DEVICE — DRESSING POSTOP AG PRISMASEAL 3.5X6IN

## (undated) DEVICE — GUIDEWIRE VASC L260CM DIA0.035IN TIP L3MM STD EXCHG PTFE J

## (undated) DEVICE — 1200 GUARD II KIT W/5MM TUBE W/O VAC TUBE: Brand: GUARDIAN

## (undated) DEVICE — ANGIOGRAPHY KIT

## (undated) DEVICE — Device

## (undated) DEVICE — BAG BELONG PT PERS CLEAR HANDL

## (undated) DEVICE — CATHETER DIAG 6FR L110CM INTRO 6FR BLLN DIA9MM 1CC PULM ART

## (undated) DEVICE — 3M™ IOBAN™ 2 ANTIMICROBIAL INCISE DRAPE 6651EZ: Brand: IOBAN™ 2

## (undated) DEVICE — SUT SLK 0 30IN CT1 BLK --

## (undated) DEVICE — 3M™ CUROS™ DISINFECTING CAP FOR NEEDLELESS CONNECTORS 270/CARTON 20 CARTONS/CASE CFF1-270: Brand: CUROS™

## (undated) DEVICE — CATHETER ETER ANGIO L110CM OD5FR ID046IN L75CM 038IN 145DEG CARD

## (undated) DEVICE — SOLIDIFIER MEDC 1200ML -- CONVERT TO 356117

## (undated) DEVICE — HOLDER SHRP TEMP SH STP DISP -- ADVANTAGE

## (undated) DEVICE — INTRO SHTH HEMO 9FR 18G 13CM -- PRELUDE SNAP PEEL-AWAY

## (undated) DEVICE — PINNACLE INTRODUCER SHEATH: Brand: PINNACLE

## (undated) DEVICE — PLASMABLADE PS210-030S 3.0S LOCK: Brand: PLASMABLADE™

## (undated) DEVICE — MICROPUNCTURE INTRODUCER SET SILHOUETTE TRANSITIONLESS WITH STAINLESS STEEL WIRE GUIDE: Brand: MICROPUNCTURE

## (undated) DEVICE — CONTAINER SPEC 20 ML LID NEUT BUFF FORMALIN 10 % POLYPR STS

## (undated) DEVICE — KIT MED IMAG CNTRST AGNT W/ IOPAMIDOL REUSE

## (undated) DEVICE — BITEBLOCK ENDOSCP 60FR MAXI WHT POLYETH STURDY W/ VELC WVN

## (undated) DEVICE — INTENT OT USE PROVIDES A STERILE INTERFACE BETWEEN THE OPERATING ROOM SURGICAL LAMPS (NON-STERILE) AND THE SURGEON OR STAFF WORKING IN THE STERILE FIELD.: Brand: ASPEN® ALC PLUS LIGHT HANDLE COVER

## (undated) DEVICE — BAG TRASH WST 122 CM 183 CM 1400 CC FEMALE LUER PVC DEPOT

## (undated) DEVICE — BAG SPEC BIOHZRD 10 X 10 IN --

## (undated) DEVICE — ANGIO-SEAL VIP VASCULAR CLOSURE DEVICE: Brand: ANGIO-SEAL

## (undated) DEVICE — CATH 5F 100CM JR40 -- DXTERITY

## (undated) DEVICE — SET GRAV CK VLV NEEDLESS ST 3 GANGED 4WAY STPCOCK HI FLO 10

## (undated) DEVICE — KIT HND CTRL 3 W STPCOCK ROT END 54IN PREM HI PRSS TBNG AT

## (undated) DEVICE — SUTURE STRATAFIX SPRL MCRYL + SZ 4-0 L12IN ABSRB UD PS-2 SXMP1B117

## (undated) DEVICE — CANN NASAL O2 CAPNOGRAPHY AD -- FILTERLINE

## (undated) DEVICE — CATH 5F 100CM JL40 -- DXTERITY

## (undated) DEVICE — SUTURE VCRL SZ 2-0 L36IN ABSRB UD L40MM CT 1/2 CIR J957H

## (undated) DEVICE — FORCEPS BX L240CM JAW DIA2.8MM L CAP W/ NDL MIC MESH TOOTH

## (undated) DEVICE — BASIN EMSIS 16OZ GRAPHITE PLAS KID SHP MOLD GRAD FOR ORAL

## (undated) DEVICE — PACK PROCEDURE SURG HRT CATH

## (undated) DEVICE — MEDI-TRACE CADENCE ADULT, DEFIBRILLATION ELECTRODE -RTS  (10 PR/PK) - PHYSIO-CONTROL: Brand: MEDI-TRACE CADENCE

## (undated) DEVICE — KENDALL RADIOLUCENT FOAM MONITORING ELECTRODE -RECTANGULAR SHAPE: Brand: KENDALL

## (undated) DEVICE — KIT MFLD ISOLATN NACL CNTRST PRT TBNG SPIK W/ PRSS TRNSDUC

## (undated) DEVICE — REM POLYHESIVE ADULT PATIENT RETURN ELECTRODE: Brand: VALLEYLAB

## (undated) DEVICE — PACEMAKER SETUP: Brand: MEDLINE INDUSTRIES, INC.

## (undated) DEVICE — KIT COLON W/ 1.1OZ LUB AND 2 END